# Patient Record
Sex: FEMALE | Race: WHITE | NOT HISPANIC OR LATINO | Employment: OTHER | URBAN - METROPOLITAN AREA
[De-identification: names, ages, dates, MRNs, and addresses within clinical notes are randomized per-mention and may not be internally consistent; named-entity substitution may affect disease eponyms.]

---

## 2019-04-25 ENCOUNTER — HOSPITAL ENCOUNTER (OUTPATIENT)
Dept: SURGERY | Facility: HOSPITAL | Age: 69
Setting detail: HOSPITAL OUTPATIENT SURGERY
Discharge: HOME | End: 2019-04-25
Attending: OPHTHALMOLOGY

## 2019-04-25 LAB — GLUCOSE BLD-MCNC: 105 MG/DL (ref 65–99)

## 2019-05-02 ENCOUNTER — HOSPITAL ENCOUNTER (OUTPATIENT)
Dept: SURGERY | Facility: HOSPITAL | Age: 69
Setting detail: HOSPITAL OUTPATIENT SURGERY
Discharge: HOME | End: 2019-05-02
Attending: OPHTHALMOLOGY

## 2019-05-02 LAB — GLUCOSE BLD-MCNC: 98 MG/DL (ref 65–99)

## 2021-08-31 ENCOUNTER — OFFICE VISIT (OUTPATIENT)
Dept: VASCULAR SURGERY | Facility: HOSPITAL | Age: 71
End: 2021-08-31

## 2021-08-31 VITALS
OXYGEN SATURATION: 96 % | SYSTOLIC BLOOD PRESSURE: 102 MMHG | TEMPERATURE: 96.2 F | HEART RATE: 74 BPM | RESPIRATION RATE: 16 BRPM | DIASTOLIC BLOOD PRESSURE: 66 MMHG

## 2021-08-31 DIAGNOSIS — I70.234 ATHEROSCLEROSIS OF NATIVE ARTERY OF BOTH LOWER EXTREMITIES WITH BILATERAL ULCERATION OF HEELS (HCC): Primary | ICD-10-CM

## 2021-08-31 DIAGNOSIS — I70.244 ATHEROSCLEROSIS OF NATIVE ARTERY OF BOTH LOWER EXTREMITIES WITH BILATERAL ULCERATION OF HEELS (HCC): Primary | ICD-10-CM

## 2021-08-31 PROCEDURE — G0463 HOSPITAL OUTPT CLINIC VISIT: HCPCS | Performed by: SURGERY

## 2021-08-31 PROCEDURE — 99203 OFFICE O/P NEW LOW 30 MIN: CPT | Performed by: SURGERY

## 2021-08-31 NOTE — PROGRESS NOTES
[facility]   HISTORY AND PHYSICAL    Patient Name: Chantal Kumar  : 1950  MRN: [id number]  Primary Care Physician:  Sasha Bui MD      Subjective   Subjective     Chief Complaint: Bilateral leg ulcers    HPI:    Chantal Kumar is a 71 y.o. female who presents with bilateral leg ulcers.  Patient lives in a nursing home.  She had a stroke several years ago and has been bedridden ever since.  She is confined to most of the bed as it is painful for her to sit up.  She has bilateral heel ulcers.  The ulcers are painful.        Personal History     No past medical history on file.    No past surgical history on file.    Family History: family history is not on file. Otherwise pertinent FHx was reviewed and not pertinent to current issue.    Social History:      Home Medications:  No current outpatient medications on file prior to visit.     No current facility-administered medications on file prior to visit.          Allergies:  No Known Allergies    Objective   Objective     Vitals:   Temp:  [96.2 °F (35.7 °C)] 96.2 °F (35.7 °C)  Heart Rate:  [74] 74  Resp:  [16] 16  BP: (102)/(66) 102/66    Physical Exam  Constitutional:       Appearance: She is obese.   HENT:      Head: Normocephalic and atraumatic.   Eyes:      Extraocular Movements: Extraocular movements intact.      Pupils: Pupils are equal, round, and reactive to light.   Cardiovascular:      Rate and Rhythm: Normal rate and regular rhythm.      Pulses:           Femoral pulses are 3+ on the right side and 3+ on the left side.       Popliteal pulses are 0 on the right side and 0 on the left side.        Dorsalis pedis pulses are 0 on the right side and 0 on the left side.        Posterior tibial pulses are 0 on the right side and 0 on the left side.      Comments: Large bilateral heel ulcers with no signs of infection.  Abdominal:      General: Bowel sounds are normal.      Palpations: Abdomen is soft.   Musculoskeletal:      Cervical  back: Normal range of motion and neck supple.   Neurological:      Mental Status: She is alert.            Result Review    Most notable findings include: Arterial Dopplers at outside hospital show moderate arterial sufficiency bilaterally    Assessment/Plan   Assessment / Plan     Brief Patient Summary:  Chantal Kumar is a 71 y.o. female who has bilateral heel ulcers and is nonambulatory    There are no diagnoses linked to this encounter.     Plan:   I explained to the patient that she is not a candidate for limb salvage due to the fact that she is nonambulatory.  At this time the ulcers are not infected.  I recommend local wound care and offloading of the heel so that the ulcers do not get any worse.  If the ulcers become infected or she has intractable pain, she would be a candidate for bilateral above-knee amputations.  The patient fully understood her diagnosis and treatment plan.    Thank you for allowing me to see your patient.        Electronically signed by Henrique Lemus MD, MD, 08/31/21, 3:27 PM EDT.

## 2022-01-17 ENCOUNTER — HOSPITAL ENCOUNTER (INPATIENT)
Facility: HOSPITAL | Age: 72
LOS: 11 days | Discharge: NURSING, REHAB OR LONG-TERM CARE | End: 2022-01-29
Attending: EMERGENCY MEDICINE | Admitting: FAMILY MEDICINE

## 2022-01-17 ENCOUNTER — APPOINTMENT (OUTPATIENT)
Dept: GENERAL RADIOLOGY | Facility: HOSPITAL | Age: 72
End: 2022-01-17

## 2022-01-17 ENCOUNTER — APPOINTMENT (OUTPATIENT)
Dept: CT IMAGING | Facility: HOSPITAL | Age: 72
End: 2022-01-17

## 2022-01-17 DIAGNOSIS — R26.2 DIFFICULTY WALKING: ICD-10-CM

## 2022-01-17 DIAGNOSIS — S31.000A WOUND OF SACRAL REGION, INITIAL ENCOUNTER: ICD-10-CM

## 2022-01-17 DIAGNOSIS — Z78.9 DECREASED ACTIVITIES OF DAILY LIVING (ADL): ICD-10-CM

## 2022-01-17 DIAGNOSIS — S91.309A MULTIPLE OPEN WOUNDS OF FOOT: Primary | ICD-10-CM

## 2022-01-17 DIAGNOSIS — E87.20 LACTIC ACIDOSIS: ICD-10-CM

## 2022-01-17 DIAGNOSIS — N39.0 ACUTE UTI: ICD-10-CM

## 2022-01-17 LAB
ALBUMIN SERPL-MCNC: 2.9 G/DL (ref 3.5–5.2)
ALBUMIN/GLOB SERPL: 0.7 G/DL
ALP SERPL-CCNC: 92 U/L (ref 39–117)
ALT SERPL W P-5'-P-CCNC: 7 U/L (ref 1–33)
ANION GAP SERPL CALCULATED.3IONS-SCNC: 12.6 MMOL/L (ref 5–15)
AST SERPL-CCNC: 11 U/L (ref 1–32)
BACTERIA UR QL AUTO: ABNORMAL /HPF
BASOPHILS # BLD AUTO: 0.03 10*3/MM3 (ref 0–0.2)
BASOPHILS NFR BLD AUTO: 0.2 % (ref 0–1.5)
BILIRUB SERPL-MCNC: <0.2 MG/DL (ref 0–1.2)
BILIRUB UR QL STRIP: NEGATIVE
BUN SERPL-MCNC: 25 MG/DL (ref 8–23)
BUN/CREAT SERPL: 18.7 (ref 7–25)
CALCIUM SPEC-SCNC: 9.6 MG/DL (ref 8.6–10.5)
CHLORIDE SERPL-SCNC: 110 MMOL/L (ref 98–107)
CLARITY UR: CLEAR
CO2 SERPL-SCNC: 21.4 MMOL/L (ref 22–29)
COLOR UR: YELLOW
CREAT SERPL-MCNC: 1.34 MG/DL (ref 0.57–1)
D-LACTATE SERPL-SCNC: 1 MMOL/L (ref 0.5–2)
D-LACTATE SERPL-SCNC: 2.6 MMOL/L (ref 0.5–2)
DEPRECATED RDW RBC AUTO: 42 FL (ref 37–54)
EOSINOPHIL # BLD AUTO: 0.05 10*3/MM3 (ref 0–0.4)
EOSINOPHIL NFR BLD AUTO: 0.3 % (ref 0.3–6.2)
ERYTHROCYTE [DISTWIDTH] IN BLOOD BY AUTOMATED COUNT: 14.1 % (ref 12.3–15.4)
GFR SERPL CREATININE-BSD FRML MDRD: 39 ML/MIN/1.73
GLOBULIN UR ELPH-MCNC: 4.2 GM/DL
GLUCOSE BLDC GLUCOMTR-MCNC: 72 MG/DL (ref 70–99)
GLUCOSE SERPL-MCNC: 71 MG/DL (ref 65–99)
GLUCOSE UR STRIP-MCNC: NEGATIVE MG/DL
HCT VFR BLD AUTO: 35 % (ref 34–46.6)
HGB BLD-MCNC: 10.9 G/DL (ref 12–15.9)
HGB UR QL STRIP.AUTO: NEGATIVE
HOLD SPECIMEN: NORMAL
HOLD SPECIMEN: NORMAL
HYALINE CASTS UR QL AUTO: ABNORMAL /LPF
IMM GRANULOCYTES # BLD AUTO: 0.07 10*3/MM3 (ref 0–0.05)
IMM GRANULOCYTES NFR BLD AUTO: 0.5 % (ref 0–0.5)
KETONES UR QL STRIP: NEGATIVE
LEUKOCYTE ESTERASE UR QL STRIP.AUTO: NEGATIVE
LYMPHOCYTES # BLD AUTO: 1.24 10*3/MM3 (ref 0.7–3.1)
LYMPHOCYTES NFR BLD AUTO: 8.1 % (ref 19.6–45.3)
MCH RBC QN AUTO: 26 PG (ref 26.6–33)
MCHC RBC AUTO-ENTMCNC: 31.1 G/DL (ref 31.5–35.7)
MCV RBC AUTO: 83.3 FL (ref 79–97)
MONOCYTES # BLD AUTO: 0.88 10*3/MM3 (ref 0.1–0.9)
MONOCYTES NFR BLD AUTO: 5.8 % (ref 5–12)
NEUTROPHILS NFR BLD AUTO: 13.03 10*3/MM3 (ref 1.7–7)
NEUTROPHILS NFR BLD AUTO: 85.1 % (ref 42.7–76)
NITRITE UR QL STRIP: NEGATIVE
NRBC BLD AUTO-RTO: 0 /100 WBC (ref 0–0.2)
PH UR STRIP.AUTO: 5.5 [PH] (ref 5–8)
PLATELET # BLD AUTO: 360 10*3/MM3 (ref 140–450)
PMV BLD AUTO: 9.4 FL (ref 6–12)
POTASSIUM SERPL-SCNC: 3.8 MMOL/L (ref 3.5–5.2)
PROT SERPL-MCNC: 7.1 G/DL (ref 6–8.5)
PROT UR QL STRIP: ABNORMAL
RBC # BLD AUTO: 4.2 10*6/MM3 (ref 3.77–5.28)
RBC # UR STRIP: ABNORMAL /HPF
REF LAB TEST METHOD: ABNORMAL
SODIUM SERPL-SCNC: 144 MMOL/L (ref 136–145)
SP GR UR STRIP: 1.02 (ref 1–1.03)
SQUAMOUS #/AREA URNS HPF: ABNORMAL /HPF
UROBILINOGEN UR QL STRIP: ABNORMAL
WBC # UR STRIP: ABNORMAL /HPF
WBC NRBC COR # BLD: 15.3 10*3/MM3 (ref 3.4–10.8)
WHOLE BLOOD HOLD SPECIMEN: NORMAL
WHOLE BLOOD HOLD SPECIMEN: NORMAL

## 2022-01-17 PROCEDURE — 87040 BLOOD CULTURE FOR BACTERIA: CPT | Performed by: EMERGENCY MEDICINE

## 2022-01-17 PROCEDURE — 36415 COLL VENOUS BLD VENIPUNCTURE: CPT | Performed by: EMERGENCY MEDICINE

## 2022-01-17 PROCEDURE — 81001 URINALYSIS AUTO W/SCOPE: CPT | Performed by: EMERGENCY MEDICINE

## 2022-01-17 PROCEDURE — 70450 CT HEAD/BRAIN W/O DYE: CPT

## 2022-01-17 PROCEDURE — 99285 EMERGENCY DEPT VISIT HI MDM: CPT

## 2022-01-17 PROCEDURE — 87040 BLOOD CULTURE FOR BACTERIA: CPT

## 2022-01-17 PROCEDURE — P9612 CATHETERIZE FOR URINE SPEC: HCPCS

## 2022-01-17 PROCEDURE — 85025 COMPLETE CBC W/AUTO DIFF WBC: CPT | Performed by: EMERGENCY MEDICINE

## 2022-01-17 PROCEDURE — 83605 ASSAY OF LACTIC ACID: CPT | Performed by: EMERGENCY MEDICINE

## 2022-01-17 PROCEDURE — 71045 X-RAY EXAM CHEST 1 VIEW: CPT

## 2022-01-17 PROCEDURE — 80053 COMPREHEN METABOLIC PANEL: CPT | Performed by: EMERGENCY MEDICINE

## 2022-01-17 PROCEDURE — 25010000002 CEFTRIAXONE PER 250 MG: Performed by: EMERGENCY MEDICINE

## 2022-01-17 PROCEDURE — 82962 GLUCOSE BLOOD TEST: CPT

## 2022-01-17 PROCEDURE — 99223 1ST HOSP IP/OBS HIGH 75: CPT | Performed by: FAMILY MEDICINE

## 2022-01-17 PROCEDURE — 25010000002 MORPHINE PER 10 MG: Performed by: EMERGENCY MEDICINE

## 2022-01-17 RX ORDER — LEVETIRACETAM 500 MG/1
500 TABLET ORAL 2 TIMES DAILY
COMMUNITY

## 2022-01-17 RX ORDER — SODIUM CHLORIDE 0.9 % (FLUSH) 0.9 %
10 SYRINGE (ML) INJECTION AS NEEDED
Status: DISCONTINUED | OUTPATIENT
Start: 2022-01-17 | End: 2022-01-29 | Stop reason: HOSPADM

## 2022-01-17 RX ORDER — GLIMEPIRIDE 2 MG/1
2 TABLET ORAL
COMMUNITY

## 2022-01-17 RX ORDER — BUSPIRONE HYDROCHLORIDE 15 MG/1
15 TABLET ORAL 3 TIMES DAILY
COMMUNITY

## 2022-01-17 RX ORDER — BUPROPION HYDROCHLORIDE 150 MG/1
150 TABLET ORAL DAILY
COMMUNITY

## 2022-01-17 RX ORDER — FUROSEMIDE 20 MG/1
20 TABLET ORAL
COMMUNITY

## 2022-01-17 RX ORDER — PROGESTERONE 100 MG/1
100 CAPSULE ORAL DAILY
COMMUNITY
End: 2022-01-17

## 2022-01-17 RX ORDER — TIZANIDINE 2 MG/1
2 TABLET ORAL 2 TIMES DAILY
COMMUNITY

## 2022-01-17 RX ORDER — TRAZODONE HYDROCHLORIDE 50 MG/1
50 TABLET ORAL
COMMUNITY

## 2022-01-17 RX ORDER — ATORVASTATIN CALCIUM 10 MG/1
TABLET, FILM COATED ORAL
COMMUNITY

## 2022-01-17 RX ORDER — ATENOLOL 25 MG/1
25 TABLET ORAL DAILY
COMMUNITY
End: 2022-01-29 | Stop reason: HOSPADM

## 2022-01-17 RX ORDER — LEVOMEFOLATE CALCIUM 7.5 MG
7.5 TABLET ORAL DAILY
COMMUNITY

## 2022-01-17 RX ORDER — MONTELUKAST SODIUM 4 MG/1
1 TABLET, CHEWABLE ORAL 2 TIMES DAILY
COMMUNITY

## 2022-01-17 RX ORDER — MORPHINE SULFATE 2 MG/ML
2 INJECTION, SOLUTION INTRAMUSCULAR; INTRAVENOUS ONCE
Status: COMPLETED | OUTPATIENT
Start: 2022-01-17 | End: 2022-01-17

## 2022-01-17 RX ORDER — MULTIPLE VITAMINS W/ MINERALS TAB 9MG-400MCG
1 TAB ORAL DAILY
COMMUNITY

## 2022-01-17 RX ORDER — CEFTRIAXONE SODIUM 1 G/50ML
1 INJECTION, SOLUTION INTRAVENOUS ONCE
Status: COMPLETED | OUTPATIENT
Start: 2022-01-17 | End: 2022-01-17

## 2022-01-17 RX ORDER — BACLOFEN 10 MG/1
10 TABLET ORAL 2 TIMES DAILY
COMMUNITY

## 2022-01-17 RX ORDER — FERROUS FUMARATE 324(106)MG
324 TABLET ORAL DAILY
COMMUNITY

## 2022-01-17 RX ORDER — QUETIAPINE FUMARATE 200 MG/1
200 TABLET, FILM COATED ORAL NIGHTLY
COMMUNITY

## 2022-01-17 RX ORDER — TRAMADOL HYDROCHLORIDE 50 MG/1
50 TABLET ORAL EVERY 8 HOURS PRN
COMMUNITY

## 2022-01-17 RX ORDER — CILOSTAZOL 100 MG/1
100 TABLET ORAL 2 TIMES DAILY
COMMUNITY

## 2022-01-17 RX ORDER — OMEPRAZOLE 20 MG/1
20 CAPSULE, DELAYED RELEASE ORAL DAILY
COMMUNITY

## 2022-01-17 RX ORDER — FENTANYL 50 UG/H
1 PATCH TRANSDERMAL
COMMUNITY

## 2022-01-17 RX ORDER — ASPIRIN 81 MG/1
81 TABLET ORAL DAILY
COMMUNITY

## 2022-01-17 RX ADMIN — SODIUM CHLORIDE 1000 ML: 9 INJECTION, SOLUTION INTRAVENOUS at 19:29

## 2022-01-17 RX ADMIN — CEFTRIAXONE SODIUM 1 G: 1 INJECTION, SOLUTION INTRAVENOUS at 19:29

## 2022-01-17 RX ADMIN — MORPHINE SULFATE 2 MG: 2 INJECTION, SOLUTION INTRAMUSCULAR; INTRAVENOUS at 21:56

## 2022-01-18 PROBLEM — S91.309A MULTIPLE OPEN WOUNDS OF FOOT: Status: ACTIVE | Noted: 2022-01-18

## 2022-01-18 LAB
ABO GROUP BLD: NORMAL
ABO GROUP BLD: NORMAL
ALBUMIN SERPL-MCNC: 2.6 G/DL (ref 3.5–5.2)
ANION GAP SERPL CALCULATED.3IONS-SCNC: 9.5 MMOL/L (ref 5–15)
BASOPHILS # BLD AUTO: 0.03 10*3/MM3 (ref 0–0.2)
BASOPHILS NFR BLD AUTO: 0.2 % (ref 0–1.5)
BLD GP AB SCN SERPL QL: NEGATIVE
BUN SERPL-MCNC: 19 MG/DL (ref 8–23)
BUN/CREAT SERPL: 17.4 (ref 7–25)
CALCIUM SPEC-SCNC: 8.8 MG/DL (ref 8.6–10.5)
CHLORIDE SERPL-SCNC: 107 MMOL/L (ref 98–107)
CO2 SERPL-SCNC: 19.5 MMOL/L (ref 22–29)
CREAT SERPL-MCNC: 1.09 MG/DL (ref 0.57–1)
DEPRECATED RDW RBC AUTO: 41.5 FL (ref 37–54)
EOSINOPHIL # BLD AUTO: 0.04 10*3/MM3 (ref 0–0.4)
EOSINOPHIL NFR BLD AUTO: 0.3 % (ref 0.3–6.2)
ERYTHROCYTE [DISTWIDTH] IN BLOOD BY AUTOMATED COUNT: 13.9 % (ref 12.3–15.4)
GFR SERPL CREATININE-BSD FRML MDRD: 49 ML/MIN/1.73
GLUCOSE SERPL-MCNC: 127 MG/DL (ref 65–99)
HCT VFR BLD AUTO: 31 % (ref 34–46.6)
HGB BLD-MCNC: 10 G/DL (ref 12–15.9)
IMM GRANULOCYTES # BLD AUTO: 0.07 10*3/MM3 (ref 0–0.05)
IMM GRANULOCYTES NFR BLD AUTO: 0.5 % (ref 0–0.5)
LYMPHOCYTES # BLD AUTO: 1.15 10*3/MM3 (ref 0.7–3.1)
LYMPHOCYTES NFR BLD AUTO: 8.9 % (ref 19.6–45.3)
MAGNESIUM SERPL-MCNC: 1.5 MG/DL (ref 1.6–2.4)
MCH RBC QN AUTO: 26.5 PG (ref 26.6–33)
MCHC RBC AUTO-ENTMCNC: 32.3 G/DL (ref 31.5–35.7)
MCV RBC AUTO: 82.2 FL (ref 79–97)
MONOCYTES # BLD AUTO: 0.64 10*3/MM3 (ref 0.1–0.9)
MONOCYTES NFR BLD AUTO: 5 % (ref 5–12)
NEUTROPHILS NFR BLD AUTO: 10.98 10*3/MM3 (ref 1.7–7)
NEUTROPHILS NFR BLD AUTO: 85.1 % (ref 42.7–76)
NRBC BLD AUTO-RTO: 0 /100 WBC (ref 0–0.2)
PHOSPHATE SERPL-MCNC: 3.3 MG/DL (ref 2.5–4.5)
PLATELET # BLD AUTO: 314 10*3/MM3 (ref 140–450)
PMV BLD AUTO: 9.3 FL (ref 6–12)
POTASSIUM SERPL-SCNC: 2.9 MMOL/L (ref 3.5–5.2)
PROCALCITONIN SERPL-MCNC: 0.14 NG/ML (ref 0–0.25)
RBC # BLD AUTO: 3.77 10*6/MM3 (ref 3.77–5.28)
RH BLD: POSITIVE
RH BLD: POSITIVE
SODIUM SERPL-SCNC: 136 MMOL/L (ref 136–145)
T&S EXPIRATION DATE: NORMAL
WBC NRBC COR # BLD: 12.91 10*3/MM3 (ref 3.4–10.8)

## 2022-01-18 PROCEDURE — 86850 RBC ANTIBODY SCREEN: CPT | Performed by: SURGERY

## 2022-01-18 PROCEDURE — 86900 BLOOD TYPING SEROLOGIC ABO: CPT

## 2022-01-18 PROCEDURE — 86901 BLOOD TYPING SEROLOGIC RH(D): CPT

## 2022-01-18 PROCEDURE — 84145 PROCALCITONIN (PCT): CPT | Performed by: INTERNAL MEDICINE

## 2022-01-18 PROCEDURE — 25010000002 HYDROMORPHONE 1 MG/ML SOLUTION: Performed by: FAMILY MEDICINE

## 2022-01-18 PROCEDURE — 25010000002 VANCOMYCIN 5 G RECONSTITUTED SOLUTION: Performed by: INTERNAL MEDICINE

## 2022-01-18 PROCEDURE — 86900 BLOOD TYPING SEROLOGIC ABO: CPT | Performed by: SURGERY

## 2022-01-18 PROCEDURE — 25010000002 HEPARIN (PORCINE) PER 1000 UNITS: Performed by: FAMILY MEDICINE

## 2022-01-18 PROCEDURE — 25010000002 PIPERACILLIN SOD-TAZOBACTAM PER 1 G: Performed by: FAMILY MEDICINE

## 2022-01-18 PROCEDURE — 83735 ASSAY OF MAGNESIUM: CPT | Performed by: INTERNAL MEDICINE

## 2022-01-18 PROCEDURE — 99233 SBSQ HOSP IP/OBS HIGH 50: CPT | Performed by: INTERNAL MEDICINE

## 2022-01-18 PROCEDURE — 85025 COMPLETE CBC W/AUTO DIFF WBC: CPT | Performed by: FAMILY MEDICINE

## 2022-01-18 PROCEDURE — 80069 RENAL FUNCTION PANEL: CPT | Performed by: INTERNAL MEDICINE

## 2022-01-18 PROCEDURE — 25010000002 MORPHINE PER 10 MG: Performed by: FAMILY MEDICINE

## 2022-01-18 PROCEDURE — 25010000002 MORPHINE PER 10 MG: Performed by: EMERGENCY MEDICINE

## 2022-01-18 PROCEDURE — 86901 BLOOD TYPING SEROLOGIC RH(D): CPT | Performed by: SURGERY

## 2022-01-18 PROCEDURE — 99222 1ST HOSP IP/OBS MODERATE 55: CPT | Performed by: SURGERY

## 2022-01-18 RX ORDER — TIZANIDINE 4 MG/1
2 TABLET ORAL 2 TIMES DAILY
Status: DISCONTINUED | OUTPATIENT
Start: 2022-01-18 | End: 2022-01-19

## 2022-01-18 RX ORDER — CHOLECALCIFEROL (VITAMIN D3) 125 MCG
5 CAPSULE ORAL NIGHTLY PRN
Status: DISCONTINUED | OUTPATIENT
Start: 2022-01-18 | End: 2022-01-29 | Stop reason: HOSPADM

## 2022-01-18 RX ORDER — BISACODYL 10 MG
10 SUPPOSITORY, RECTAL RECTAL DAILY PRN
Status: DISCONTINUED | OUTPATIENT
Start: 2022-01-18 | End: 2022-01-29 | Stop reason: HOSPADM

## 2022-01-18 RX ORDER — FENTANYL 50 UG/H
1 PATCH TRANSDERMAL
Status: DISCONTINUED | OUTPATIENT
Start: 2022-01-18 | End: 2022-01-29 | Stop reason: HOSPADM

## 2022-01-18 RX ORDER — BUSPIRONE HYDROCHLORIDE 15 MG/1
15 TABLET ORAL 3 TIMES DAILY
Status: DISCONTINUED | OUTPATIENT
Start: 2022-01-18 | End: 2022-01-29 | Stop reason: HOSPADM

## 2022-01-18 RX ORDER — MORPHINE SULFATE 2 MG/ML
2 INJECTION, SOLUTION INTRAMUSCULAR; INTRAVENOUS ONCE
Status: COMPLETED | OUTPATIENT
Start: 2022-01-18 | End: 2022-01-18

## 2022-01-18 RX ORDER — SODIUM CHLORIDE 0.9 % (FLUSH) 0.9 %
10 SYRINGE (ML) INJECTION EVERY 12 HOURS SCHEDULED
Status: DISCONTINUED | OUTPATIENT
Start: 2022-01-18 | End: 2022-01-29 | Stop reason: HOSPADM

## 2022-01-18 RX ORDER — ACETAMINOPHEN 325 MG/1
650 TABLET ORAL EVERY 4 HOURS PRN
Status: DISCONTINUED | OUTPATIENT
Start: 2022-01-18 | End: 2022-01-29 | Stop reason: HOSPADM

## 2022-01-18 RX ORDER — BISACODYL 5 MG/1
5 TABLET, DELAYED RELEASE ORAL DAILY PRN
Status: DISCONTINUED | OUTPATIENT
Start: 2022-01-18 | End: 2022-01-29 | Stop reason: HOSPADM

## 2022-01-18 RX ORDER — QUETIAPINE FUMARATE 200 MG/1
200 TABLET, FILM COATED ORAL NIGHTLY
Status: DISCONTINUED | OUTPATIENT
Start: 2022-01-18 | End: 2022-01-19

## 2022-01-18 RX ORDER — ASPIRIN 81 MG/1
81 TABLET ORAL DAILY
Status: DISCONTINUED | OUTPATIENT
Start: 2022-01-19 | End: 2022-01-28

## 2022-01-18 RX ORDER — HYDROCODONE BITARTRATE AND ACETAMINOPHEN 5; 325 MG/1; MG/1
1 TABLET ORAL ONCE
Status: COMPLETED | OUTPATIENT
Start: 2022-01-18 | End: 2022-01-18

## 2022-01-18 RX ORDER — LEVETIRACETAM 500 MG/1
500 TABLET ORAL 2 TIMES DAILY
Status: DISCONTINUED | OUTPATIENT
Start: 2022-01-18 | End: 2022-01-29 | Stop reason: HOSPADM

## 2022-01-18 RX ORDER — AMOXICILLIN 250 MG
2 CAPSULE ORAL 2 TIMES DAILY
Status: DISCONTINUED | OUTPATIENT
Start: 2022-01-18 | End: 2022-01-29 | Stop reason: HOSPADM

## 2022-01-18 RX ORDER — BUPROPION HYDROCHLORIDE 150 MG/1
150 TABLET ORAL DAILY
Status: DISCONTINUED | OUTPATIENT
Start: 2022-01-18 | End: 2022-01-29 | Stop reason: HOSPADM

## 2022-01-18 RX ORDER — ATENOLOL 25 MG/1
25 TABLET ORAL DAILY
Status: DISCONTINUED | OUTPATIENT
Start: 2022-01-18 | End: 2022-01-21

## 2022-01-18 RX ORDER — TRAZODONE HYDROCHLORIDE 50 MG/1
25 TABLET ORAL NIGHTLY
Status: DISCONTINUED | OUTPATIENT
Start: 2022-01-18 | End: 2022-01-18

## 2022-01-18 RX ORDER — POLYETHYLENE GLYCOL 3350 17 G/17G
17 POWDER, FOR SOLUTION ORAL DAILY PRN
Status: DISCONTINUED | OUTPATIENT
Start: 2022-01-18 | End: 2022-01-29 | Stop reason: HOSPADM

## 2022-01-18 RX ORDER — ACETAMINOPHEN 160 MG/5ML
650 SOLUTION ORAL EVERY 4 HOURS PRN
Status: DISCONTINUED | OUTPATIENT
Start: 2022-01-18 | End: 2022-01-29 | Stop reason: HOSPADM

## 2022-01-18 RX ORDER — NALOXONE HCL 0.4 MG/ML
0.4 VIAL (ML) INJECTION
Status: DISCONTINUED | OUTPATIENT
Start: 2022-01-18 | End: 2022-01-29 | Stop reason: HOSPADM

## 2022-01-18 RX ORDER — ACETAMINOPHEN 650 MG/1
650 SUPPOSITORY RECTAL EVERY 4 HOURS PRN
Status: DISCONTINUED | OUTPATIENT
Start: 2022-01-18 | End: 2022-01-29 | Stop reason: HOSPADM

## 2022-01-18 RX ORDER — HEPARIN SODIUM 5000 [USP'U]/ML
5000 INJECTION, SOLUTION INTRAVENOUS; SUBCUTANEOUS EVERY 8 HOURS SCHEDULED
Status: DISCONTINUED | OUTPATIENT
Start: 2022-01-18 | End: 2022-01-28

## 2022-01-18 RX ORDER — SODIUM CHLORIDE 0.9 % (FLUSH) 0.9 %
10 SYRINGE (ML) INJECTION AS NEEDED
Status: DISCONTINUED | OUTPATIENT
Start: 2022-01-18 | End: 2022-01-29 | Stop reason: HOSPADM

## 2022-01-18 RX ORDER — ONDANSETRON 2 MG/ML
4 INJECTION INTRAMUSCULAR; INTRAVENOUS EVERY 6 HOURS PRN
Status: DISCONTINUED | OUTPATIENT
Start: 2022-01-18 | End: 2022-01-29 | Stop reason: HOSPADM

## 2022-01-18 RX ORDER — POTASSIUM CHLORIDE 750 MG/1
30 CAPSULE, EXTENDED RELEASE ORAL ONCE
Status: COMPLETED | OUTPATIENT
Start: 2022-01-18 | End: 2022-01-18

## 2022-01-18 RX ORDER — MORPHINE SULFATE 2 MG/ML
1 INJECTION, SOLUTION INTRAMUSCULAR; INTRAVENOUS EVERY 4 HOURS PRN
Status: DISCONTINUED | OUTPATIENT
Start: 2022-01-18 | End: 2022-01-25

## 2022-01-18 RX ADMIN — FENTANYL TRANSDERMAL 1 PATCH: 50 PATCH, EXTENDED RELEASE TRANSDERMAL at 22:15

## 2022-01-18 RX ADMIN — BUSPIRONE HYDROCHLORIDE 15 MG: 15 TABLET ORAL at 22:11

## 2022-01-18 RX ADMIN — VANCOMYCIN HYDROCHLORIDE 1500 MG: 5 INJECTION, POWDER, LYOPHILIZED, FOR SOLUTION INTRAVENOUS at 22:32

## 2022-01-18 RX ADMIN — ATENOLOL 25 MG: 25 TABLET ORAL at 22:11

## 2022-01-18 RX ADMIN — HYDROCODONE BITARTRATE AND ACETAMINOPHEN 1 TABLET: 5; 325 TABLET ORAL at 04:44

## 2022-01-18 RX ADMIN — TAZOBACTAM SODIUM AND PIPERACILLIN SODIUM 3.38 G: 375; 3 INJECTION, SOLUTION INTRAVENOUS at 07:49

## 2022-01-18 RX ADMIN — MORPHINE SULFATE 1 MG: 2 INJECTION, SOLUTION INTRAMUSCULAR; INTRAVENOUS at 11:34

## 2022-01-18 RX ADMIN — SODIUM CHLORIDE, PRESERVATIVE FREE 10 ML: 5 INJECTION INTRAVENOUS at 22:14

## 2022-01-18 RX ADMIN — HYDROMORPHONE HYDROCHLORIDE 0.5 MG: 1 INJECTION, SOLUTION INTRAMUSCULAR; INTRAVENOUS; SUBCUTANEOUS at 04:06

## 2022-01-18 RX ADMIN — HEPARIN SODIUM 5000 UNITS: 5000 INJECTION, SOLUTION INTRAVENOUS; SUBCUTANEOUS at 16:56

## 2022-01-18 RX ADMIN — QUETIAPINE FUMARATE 200 MG: 200 TABLET ORAL at 22:12

## 2022-01-18 RX ADMIN — MORPHINE SULFATE 1 MG: 2 INJECTION, SOLUTION INTRAMUSCULAR; INTRAVENOUS at 18:58

## 2022-01-18 RX ADMIN — TAZOBACTAM SODIUM AND PIPERACILLIN SODIUM 3.38 G: 375; 3 INJECTION, SOLUTION INTRAVENOUS at 16:55

## 2022-01-18 RX ADMIN — MORPHINE SULFATE 1 MG: 2 INJECTION, SOLUTION INTRAMUSCULAR; INTRAVENOUS at 23:30

## 2022-01-18 RX ADMIN — TIZANIDINE 2 MG: 4 TABLET ORAL at 22:11

## 2022-01-18 RX ADMIN — HEPARIN SODIUM 5000 UNITS: 5000 INJECTION, SOLUTION INTRAVENOUS; SUBCUTANEOUS at 22:12

## 2022-01-18 RX ADMIN — LEVETIRACETAM 500 MG: 500 TABLET, FILM COATED ORAL at 22:10

## 2022-01-18 RX ADMIN — MORPHINE SULFATE 2 MG: 2 INJECTION, SOLUTION INTRAMUSCULAR; INTRAVENOUS at 01:59

## 2022-01-18 RX ADMIN — HEPARIN SODIUM 5000 UNITS: 5000 INJECTION, SOLUTION INTRAVENOUS; SUBCUTANEOUS at 05:25

## 2022-01-18 RX ADMIN — BUPROPION HYDROCHLORIDE 150 MG: 150 TABLET, EXTENDED RELEASE ORAL at 22:11

## 2022-01-18 RX ADMIN — POTASSIUM CHLORIDE 30 MEQ: 750 CAPSULE, EXTENDED RELEASE ORAL at 16:56

## 2022-01-18 RX ADMIN — SENNOSIDES AND DOCUSATE SODIUM 2 TABLET: 50; 8.6 TABLET ORAL at 08:07

## 2022-01-19 LAB
ALBUMIN SERPL-MCNC: 2.4 G/DL (ref 3.5–5.2)
ALBUMIN/GLOB SERPL: 0.6 G/DL
ALP SERPL-CCNC: 90 U/L (ref 39–117)
ALT SERPL W P-5'-P-CCNC: 7 U/L (ref 1–33)
ANION GAP SERPL CALCULATED.3IONS-SCNC: 10.5 MMOL/L (ref 5–15)
AST SERPL-CCNC: 9 U/L (ref 1–32)
BASOPHILS # BLD AUTO: 0.04 10*3/MM3 (ref 0–0.2)
BASOPHILS NFR BLD AUTO: 0.4 % (ref 0–1.5)
BILIRUB SERPL-MCNC: 0.2 MG/DL (ref 0–1.2)
BUN SERPL-MCNC: 14 MG/DL (ref 8–23)
BUN/CREAT SERPL: 12.7 (ref 7–25)
CALCIUM SPEC-SCNC: 8.9 MG/DL (ref 8.6–10.5)
CHLORIDE SERPL-SCNC: 108 MMOL/L (ref 98–107)
CO2 SERPL-SCNC: 18.5 MMOL/L (ref 22–29)
CREAT SERPL-MCNC: 1.1 MG/DL (ref 0.57–1)
DEPRECATED RDW RBC AUTO: 40.9 FL (ref 37–54)
EOSINOPHIL # BLD AUTO: 0.09 10*3/MM3 (ref 0–0.4)
EOSINOPHIL NFR BLD AUTO: 0.9 % (ref 0.3–6.2)
ERYTHROCYTE [DISTWIDTH] IN BLOOD BY AUTOMATED COUNT: 13.9 % (ref 12.3–15.4)
GFR SERPL CREATININE-BSD FRML MDRD: 49 ML/MIN/1.73
GLOBULIN UR ELPH-MCNC: 3.9 GM/DL
GLUCOSE SERPL-MCNC: 127 MG/DL (ref 65–99)
HCT VFR BLD AUTO: 33.4 % (ref 34–46.6)
HGB BLD-MCNC: 10.7 G/DL (ref 12–15.9)
IMM GRANULOCYTES # BLD AUTO: 0.06 10*3/MM3 (ref 0–0.05)
IMM GRANULOCYTES NFR BLD AUTO: 0.6 % (ref 0–0.5)
INR PPP: 1.08 (ref 2–3)
LYMPHOCYTES # BLD AUTO: 1.6 10*3/MM3 (ref 0.7–3.1)
LYMPHOCYTES NFR BLD AUTO: 15.8 % (ref 19.6–45.3)
MCH RBC QN AUTO: 25.7 PG (ref 26.6–33)
MCHC RBC AUTO-ENTMCNC: 32 G/DL (ref 31.5–35.7)
MCV RBC AUTO: 80.3 FL (ref 79–97)
MONOCYTES # BLD AUTO: 0.63 10*3/MM3 (ref 0.1–0.9)
MONOCYTES NFR BLD AUTO: 6.2 % (ref 5–12)
NEUTROPHILS NFR BLD AUTO: 7.73 10*3/MM3 (ref 1.7–7)
NEUTROPHILS NFR BLD AUTO: 76.1 % (ref 42.7–76)
NRBC BLD AUTO-RTO: 0 /100 WBC (ref 0–0.2)
PHOSPHATE SERPL-MCNC: 3 MG/DL (ref 2.5–4.5)
PLATELET # BLD AUTO: 287 10*3/MM3 (ref 140–450)
PMV BLD AUTO: 9.5 FL (ref 6–12)
POTASSIUM SERPL-SCNC: 3.2 MMOL/L (ref 3.5–5.2)
PROT SERPL-MCNC: 6.3 G/DL (ref 6–8.5)
PROTHROMBIN TIME: 11.2 SECONDS (ref 9.4–12)
RBC # BLD AUTO: 4.16 10*6/MM3 (ref 3.77–5.28)
SARS-COV-2 RNA RESP QL NAA+PROBE: NOT DETECTED
SODIUM SERPL-SCNC: 137 MMOL/L (ref 136–145)
VANCOMYCIN SERPL-MCNC: 14.2 MCG/ML (ref 5–40)
WBC NRBC COR # BLD: 10.15 10*3/MM3 (ref 3.4–10.8)

## 2022-01-19 PROCEDURE — 25010000002 VANCOMYCIN 5 G RECONSTITUTED SOLUTION: Performed by: INTERNAL MEDICINE

## 2022-01-19 PROCEDURE — U0003 INFECTIOUS AGENT DETECTION BY NUCLEIC ACID (DNA OR RNA); SEVERE ACUTE RESPIRATORY SYNDROME CORONAVIRUS 2 (SARS-COV-2) (CORONAVIRUS DISEASE [COVID-19]), AMPLIFIED PROBE TECHNIQUE, MAKING USE OF HIGH THROUGHPUT TECHNOLOGIES AS DESCRIBED BY CMS-2020-01-R: HCPCS | Performed by: INTERNAL MEDICINE

## 2022-01-19 PROCEDURE — 85610 PROTHROMBIN TIME: CPT | Performed by: INTERNAL MEDICINE

## 2022-01-19 PROCEDURE — 80202 ASSAY OF VANCOMYCIN: CPT | Performed by: INTERNAL MEDICINE

## 2022-01-19 PROCEDURE — U0005 INFEC AGEN DETEC AMPLI PROBE: HCPCS | Performed by: INTERNAL MEDICINE

## 2022-01-19 PROCEDURE — 80053 COMPREHEN METABOLIC PANEL: CPT | Performed by: INTERNAL MEDICINE

## 2022-01-19 PROCEDURE — 85025 COMPLETE CBC W/AUTO DIFF WBC: CPT | Performed by: INTERNAL MEDICINE

## 2022-01-19 PROCEDURE — 25010000002 PIPERACILLIN SOD-TAZOBACTAM PER 1 G: Performed by: FAMILY MEDICINE

## 2022-01-19 PROCEDURE — 99233 SBSQ HOSP IP/OBS HIGH 50: CPT | Performed by: INTERNAL MEDICINE

## 2022-01-19 PROCEDURE — 84100 ASSAY OF PHOSPHORUS: CPT | Performed by: INTERNAL MEDICINE

## 2022-01-19 PROCEDURE — 25010000002 HEPARIN (PORCINE) PER 1000 UNITS: Performed by: FAMILY MEDICINE

## 2022-01-19 RX ORDER — SODIUM CHLORIDE, SODIUM LACTATE, POTASSIUM CHLORIDE, CALCIUM CHLORIDE 600; 310; 30; 20 MG/100ML; MG/100ML; MG/100ML; MG/100ML
125 INJECTION, SOLUTION INTRAVENOUS CONTINUOUS
Status: DISCONTINUED | OUTPATIENT
Start: 2022-01-19 | End: 2022-01-27

## 2022-01-19 RX ORDER — QUETIAPINE FUMARATE 100 MG/1
100 TABLET, FILM COATED ORAL NIGHTLY
Status: DISCONTINUED | OUTPATIENT
Start: 2022-01-19 | End: 2022-01-29 | Stop reason: HOSPADM

## 2022-01-19 RX ADMIN — ACETAMINOPHEN 650 MG: 325 TABLET ORAL at 20:22

## 2022-01-19 RX ADMIN — HEPARIN SODIUM 5000 UNITS: 5000 INJECTION, SOLUTION INTRAVENOUS; SUBCUTANEOUS at 13:48

## 2022-01-19 RX ADMIN — HEPARIN SODIUM 5000 UNITS: 5000 INJECTION, SOLUTION INTRAVENOUS; SUBCUTANEOUS at 06:24

## 2022-01-19 RX ADMIN — SODIUM CHLORIDE 500 ML: 9 INJECTION, SOLUTION INTRAVENOUS at 11:01

## 2022-01-19 RX ADMIN — SODIUM CHLORIDE 1000 ML: 9 INJECTION, SOLUTION INTRAVENOUS at 15:23

## 2022-01-19 RX ADMIN — BUSPIRONE HYDROCHLORIDE 15 MG: 15 TABLET ORAL at 20:22

## 2022-01-19 RX ADMIN — SODIUM CHLORIDE, PRESERVATIVE FREE 10 ML: 5 INJECTION INTRAVENOUS at 20:23

## 2022-01-19 RX ADMIN — LEVETIRACETAM 500 MG: 500 TABLET, FILM COATED ORAL at 20:22

## 2022-01-19 RX ADMIN — SODIUM CHLORIDE, POTASSIUM CHLORIDE, SODIUM LACTATE AND CALCIUM CHLORIDE 75 ML/HR: 600; 310; 30; 20 INJECTION, SOLUTION INTRAVENOUS at 18:15

## 2022-01-19 RX ADMIN — TIZANIDINE 2 MG: 4 TABLET ORAL at 10:16

## 2022-01-19 RX ADMIN — SENNOSIDES AND DOCUSATE SODIUM 2 TABLET: 50; 8.6 TABLET ORAL at 20:22

## 2022-01-19 RX ADMIN — TAZOBACTAM SODIUM AND PIPERACILLIN SODIUM 3.38 G: 375; 3 INJECTION, SOLUTION INTRAVENOUS at 10:15

## 2022-01-19 RX ADMIN — ASPIRIN 81 MG: 81 TABLET, COATED ORAL at 09:49

## 2022-01-19 RX ADMIN — LEVETIRACETAM 500 MG: 500 TABLET, FILM COATED ORAL at 10:14

## 2022-01-19 RX ADMIN — QUETIAPINE FUMARATE 100 MG: 200 TABLET ORAL at 20:22

## 2022-01-19 RX ADMIN — HEPARIN SODIUM 5000 UNITS: 5000 INJECTION, SOLUTION INTRAVENOUS; SUBCUTANEOUS at 22:08

## 2022-01-19 RX ADMIN — TAZOBACTAM SODIUM AND PIPERACILLIN SODIUM 3.38 G: 375; 3 INJECTION, SOLUTION INTRAVENOUS at 18:15

## 2022-01-19 RX ADMIN — ACETAMINOPHEN 650 MG: 325 TABLET ORAL at 15:24

## 2022-01-19 RX ADMIN — SODIUM CHLORIDE, PRESERVATIVE FREE 10 ML: 5 INJECTION INTRAVENOUS at 10:17

## 2022-01-19 RX ADMIN — VANCOMYCIN HYDROCHLORIDE 1250 MG: 5 INJECTION, POWDER, LYOPHILIZED, FOR SOLUTION INTRAVENOUS at 13:48

## 2022-01-20 ENCOUNTER — APPOINTMENT (OUTPATIENT)
Dept: CARDIOLOGY | Facility: HOSPITAL | Age: 72
End: 2022-01-20

## 2022-01-20 LAB
ALBUMIN SERPL-MCNC: 2.5 G/DL (ref 3.5–5.2)
ANION GAP SERPL CALCULATED.3IONS-SCNC: 9.4 MMOL/L (ref 5–15)
BASOPHILS # BLD AUTO: 0.04 10*3/MM3 (ref 0–0.2)
BASOPHILS NFR BLD AUTO: 0.5 % (ref 0–1.5)
BUN SERPL-MCNC: 14 MG/DL (ref 8–23)
BUN/CREAT SERPL: 14.1 (ref 7–25)
CALCIUM SPEC-SCNC: 8.5 MG/DL (ref 8.6–10.5)
CHLORIDE SERPL-SCNC: 106 MMOL/L (ref 98–107)
CO2 SERPL-SCNC: 21.6 MMOL/L (ref 22–29)
CREAT SERPL-MCNC: 0.99 MG/DL (ref 0.57–1)
DEPRECATED RDW RBC AUTO: 40.8 FL (ref 37–54)
EOSINOPHIL # BLD AUTO: 0.15 10*3/MM3 (ref 0–0.4)
EOSINOPHIL NFR BLD AUTO: 2 % (ref 0.3–6.2)
ERYTHROCYTE [DISTWIDTH] IN BLOOD BY AUTOMATED COUNT: 13.9 % (ref 12.3–15.4)
GFR SERPL CREATININE-BSD FRML MDRD: 55 ML/MIN/1.73
GLUCOSE SERPL-MCNC: 154 MG/DL (ref 65–99)
HCT VFR BLD AUTO: 32.1 % (ref 34–46.6)
HGB BLD-MCNC: 10.3 G/DL (ref 12–15.9)
IMM GRANULOCYTES # BLD AUTO: 0.07 10*3/MM3 (ref 0–0.05)
IMM GRANULOCYTES NFR BLD AUTO: 0.9 % (ref 0–0.5)
LYMPHOCYTES # BLD AUTO: 1.68 10*3/MM3 (ref 0.7–3.1)
LYMPHOCYTES NFR BLD AUTO: 22.7 % (ref 19.6–45.3)
MAGNESIUM SERPL-MCNC: 1.7 MG/DL (ref 1.6–2.4)
MCH RBC QN AUTO: 25.9 PG (ref 26.6–33)
MCHC RBC AUTO-ENTMCNC: 32.1 G/DL (ref 31.5–35.7)
MCV RBC AUTO: 80.7 FL (ref 79–97)
MONOCYTES # BLD AUTO: 0.52 10*3/MM3 (ref 0.1–0.9)
MONOCYTES NFR BLD AUTO: 7 % (ref 5–12)
NEUTROPHILS NFR BLD AUTO: 4.95 10*3/MM3 (ref 1.7–7)
NEUTROPHILS NFR BLD AUTO: 66.9 % (ref 42.7–76)
NRBC BLD AUTO-RTO: 0 /100 WBC (ref 0–0.2)
NT-PROBNP SERPL-MCNC: 9200 PG/ML (ref 0–900)
PHOSPHATE SERPL-MCNC: 3 MG/DL (ref 2.5–4.5)
PLATELET # BLD AUTO: 287 10*3/MM3 (ref 140–450)
PMV BLD AUTO: 9.5 FL (ref 6–12)
POTASSIUM SERPL-SCNC: 3.1 MMOL/L (ref 3.5–5.2)
QT INTERVAL: 318 MS
RBC # BLD AUTO: 3.98 10*6/MM3 (ref 3.77–5.28)
SODIUM SERPL-SCNC: 137 MMOL/L (ref 136–145)
WBC NRBC COR # BLD: 7.41 10*3/MM3 (ref 3.4–10.8)

## 2022-01-20 PROCEDURE — 25010000002 VANCOMYCIN 5 G RECONSTITUTED SOLUTION: Performed by: INTERNAL MEDICINE

## 2022-01-20 PROCEDURE — 25010000002 HEPARIN (PORCINE) PER 1000 UNITS: Performed by: FAMILY MEDICINE

## 2022-01-20 PROCEDURE — 83735 ASSAY OF MAGNESIUM: CPT | Performed by: INTERNAL MEDICINE

## 2022-01-20 PROCEDURE — 25010000002 PIPERACILLIN SOD-TAZOBACTAM PER 1 G: Performed by: FAMILY MEDICINE

## 2022-01-20 PROCEDURE — 80069 RENAL FUNCTION PANEL: CPT | Performed by: INTERNAL MEDICINE

## 2022-01-20 PROCEDURE — 25010000002 KETOROLAC TROMETHAMINE PER 15 MG: Performed by: PHYSICIAN ASSISTANT

## 2022-01-20 PROCEDURE — 85025 COMPLETE CBC W/AUTO DIFF WBC: CPT | Performed by: INTERNAL MEDICINE

## 2022-01-20 PROCEDURE — 83880 ASSAY OF NATRIURETIC PEPTIDE: CPT | Performed by: INTERNAL MEDICINE

## 2022-01-20 PROCEDURE — 93005 ELECTROCARDIOGRAM TRACING: CPT | Performed by: INTERNAL MEDICINE

## 2022-01-20 PROCEDURE — 99233 SBSQ HOSP IP/OBS HIGH 50: CPT | Performed by: INTERNAL MEDICINE

## 2022-01-20 PROCEDURE — 93306 TTE W/DOPPLER COMPLETE: CPT

## 2022-01-20 RX ORDER — KETOROLAC TROMETHAMINE 15 MG/ML
15 INJECTION, SOLUTION INTRAMUSCULAR; INTRAVENOUS ONCE
Status: COMPLETED | OUTPATIENT
Start: 2022-01-20 | End: 2022-01-20

## 2022-01-20 RX ORDER — POTASSIUM CHLORIDE 750 MG/1
20 CAPSULE, EXTENDED RELEASE ORAL ONCE
Status: COMPLETED | OUTPATIENT
Start: 2022-01-20 | End: 2022-01-20

## 2022-01-20 RX ADMIN — SENNOSIDES AND DOCUSATE SODIUM 2 TABLET: 50; 8.6 TABLET ORAL at 08:36

## 2022-01-20 RX ADMIN — KETOROLAC TROMETHAMINE 15 MG: 15 INJECTION, SOLUTION INTRAMUSCULAR; INTRAVENOUS at 02:57

## 2022-01-20 RX ADMIN — BUSPIRONE HYDROCHLORIDE 15 MG: 15 TABLET ORAL at 08:36

## 2022-01-20 RX ADMIN — TAZOBACTAM SODIUM AND PIPERACILLIN SODIUM 3.38 G: 375; 3 INJECTION, SOLUTION INTRAVENOUS at 23:55

## 2022-01-20 RX ADMIN — SODIUM CHLORIDE, POTASSIUM CHLORIDE, SODIUM LACTATE AND CALCIUM CHLORIDE 75 ML/HR: 600; 310; 30; 20 INJECTION, SOLUTION INTRAVENOUS at 08:34

## 2022-01-20 RX ADMIN — VANCOMYCIN HYDROCHLORIDE 1250 MG: 5 INJECTION, POWDER, LYOPHILIZED, FOR SOLUTION INTRAVENOUS at 14:33

## 2022-01-20 RX ADMIN — SODIUM CHLORIDE, PRESERVATIVE FREE 10 ML: 5 INJECTION INTRAVENOUS at 22:01

## 2022-01-20 RX ADMIN — POTASSIUM CHLORIDE 20 MEQ: 750 CAPSULE, EXTENDED RELEASE ORAL at 08:45

## 2022-01-20 RX ADMIN — LEVETIRACETAM 500 MG: 500 TABLET, FILM COATED ORAL at 08:37

## 2022-01-20 RX ADMIN — QUETIAPINE FUMARATE 100 MG: 200 TABLET ORAL at 21:56

## 2022-01-20 RX ADMIN — BUSPIRONE HYDROCHLORIDE 15 MG: 15 TABLET ORAL at 17:08

## 2022-01-20 RX ADMIN — ACETAMINOPHEN 650 MG: 325 TABLET ORAL at 21:56

## 2022-01-20 RX ADMIN — ASPIRIN 81 MG: 81 TABLET, COATED ORAL at 08:36

## 2022-01-20 RX ADMIN — TAZOBACTAM SODIUM AND PIPERACILLIN SODIUM 3.38 G: 375; 3 INJECTION, SOLUTION INTRAVENOUS at 08:38

## 2022-01-20 RX ADMIN — ATENOLOL 25 MG: 25 TABLET ORAL at 08:36

## 2022-01-20 RX ADMIN — BUSPIRONE HYDROCHLORIDE 15 MG: 15 TABLET ORAL at 21:56

## 2022-01-20 RX ADMIN — SODIUM CHLORIDE, PRESERVATIVE FREE 10 ML: 5 INJECTION INTRAVENOUS at 08:37

## 2022-01-20 RX ADMIN — BUPROPION HYDROCHLORIDE 150 MG: 150 TABLET, EXTENDED RELEASE ORAL at 08:34

## 2022-01-20 RX ADMIN — TAZOBACTAM SODIUM AND PIPERACILLIN SODIUM 3.38 G: 375; 3 INJECTION, SOLUTION INTRAVENOUS at 17:08

## 2022-01-20 RX ADMIN — LEVETIRACETAM 500 MG: 500 TABLET, FILM COATED ORAL at 21:56

## 2022-01-20 RX ADMIN — HEPARIN SODIUM 5000 UNITS: 5000 INJECTION, SOLUTION INTRAVENOUS; SUBCUTANEOUS at 05:52

## 2022-01-20 RX ADMIN — TAZOBACTAM SODIUM AND PIPERACILLIN SODIUM 3.38 G: 375; 3 INJECTION, SOLUTION INTRAVENOUS at 00:26

## 2022-01-20 RX ADMIN — Medication 5 MG: at 22:34

## 2022-01-20 RX ADMIN — HEPARIN SODIUM 5000 UNITS: 5000 INJECTION, SOLUTION INTRAVENOUS; SUBCUTANEOUS at 14:33

## 2022-01-20 RX ADMIN — HEPARIN SODIUM 5000 UNITS: 5000 INJECTION, SOLUTION INTRAVENOUS; SUBCUTANEOUS at 21:56

## 2022-01-21 LAB
ANION GAP SERPL CALCULATED.3IONS-SCNC: 10 MMOL/L (ref 5–15)
BH CV ECHO MEAS - AO ROOT DIAM: 2.9 CM
BH CV ECHO MEAS - EDV(MOD-SP2): 75 ML
BH CV ECHO MEAS - EDV(MOD-SP4): 43 ML
BH CV ECHO MEAS - EF(MOD-BP): 51 %
BH CV ECHO MEAS - ESV(MOD-SP2): 33 ML
BH CV ECHO MEAS - ESV(MOD-SP4): 21 ML
BH CV ECHO MEAS - IVSD: 1.1 CM
BH CV ECHO MEAS - LA DIMENSION(2D): 4.5 CM
BH CV ECHO MEAS - LVIDD: 4 CM
BH CV ECHO MEAS - LVIDS: 2.5 CM
BH CV ECHO MEAS - LVOT DIAM: 2 CM
BH CV ECHO MEAS - LVPWD: 1.4 CM
BH CV ECHO MEAS - RAP SYSTOLE: 3 MMHG
BH CV ECHO MEAS - RVDD: 2.5 CM
BH CV ECHO MEAS - RVSP: 20 MMHG
BH CV ECHO MEAS - TR MAX PG: 17 MMHG
BH CV ECHO MEAS - TR MAX VEL: 206 CM/SEC
BUN SERPL-MCNC: 13 MG/DL (ref 8–23)
BUN/CREAT SERPL: 9.8 (ref 7–25)
CALCIUM SPEC-SCNC: 8.9 MG/DL (ref 8.6–10.5)
CHLORIDE SERPL-SCNC: 104 MMOL/L (ref 98–107)
CO2 SERPL-SCNC: 22 MMOL/L (ref 22–29)
CREAT SERPL-MCNC: 1.33 MG/DL (ref 0.57–1)
GFR SERPL CREATININE-BSD FRML MDRD: 39 ML/MIN/1.73
GLUCOSE SERPL-MCNC: 189 MG/DL (ref 65–99)
IVRT: 92 MSEC
LEFT ATRIUM VOLUME INDEX: 37.9 ML/M2
MAXIMAL PREDICTED HEART RATE: 149 BPM
POTASSIUM SERPL-SCNC: 3.8 MMOL/L (ref 3.5–5.2)
SODIUM SERPL-SCNC: 136 MMOL/L (ref 136–145)
STRESS TARGET HR: 127 BPM
WHOLE BLOOD HOLD SPECIMEN: NORMAL

## 2022-01-21 PROCEDURE — 93005 ELECTROCARDIOGRAM TRACING: CPT | Performed by: HOSPITALIST

## 2022-01-21 PROCEDURE — 25010000002 PIPERACILLIN SOD-TAZOBACTAM PER 1 G: Performed by: FAMILY MEDICINE

## 2022-01-21 PROCEDURE — 99231 SBSQ HOSP IP/OBS SF/LOW 25: CPT | Performed by: SURGERY

## 2022-01-21 PROCEDURE — 25010000002 MORPHINE PER 10 MG: Performed by: FAMILY MEDICINE

## 2022-01-21 PROCEDURE — 99233 SBSQ HOSP IP/OBS HIGH 50: CPT | Performed by: INTERNAL MEDICINE

## 2022-01-21 PROCEDURE — 80048 BASIC METABOLIC PNL TOTAL CA: CPT | Performed by: INTERNAL MEDICINE

## 2022-01-21 PROCEDURE — 25010000002 VANCOMYCIN 5 G RECONSTITUTED SOLUTION: Performed by: INTERNAL MEDICINE

## 2022-01-21 PROCEDURE — 25010000002 HEPARIN (PORCINE) PER 1000 UNITS: Performed by: FAMILY MEDICINE

## 2022-01-21 RX ORDER — MAGNESIUM SULFATE HEPTAHYDRATE 40 MG/ML
2 INJECTION, SOLUTION INTRAVENOUS ONCE
Status: COMPLETED | OUTPATIENT
Start: 2022-01-22 | End: 2022-01-22

## 2022-01-21 RX ADMIN — BUSPIRONE HYDROCHLORIDE 15 MG: 15 TABLET ORAL at 17:52

## 2022-01-21 RX ADMIN — TAZOBACTAM SODIUM AND PIPERACILLIN SODIUM 3.38 G: 375; 3 INJECTION, SOLUTION INTRAVENOUS at 17:52

## 2022-01-21 RX ADMIN — QUETIAPINE FUMARATE 100 MG: 200 TABLET ORAL at 21:54

## 2022-01-21 RX ADMIN — MORPHINE SULFATE 1 MG: 2 INJECTION, SOLUTION INTRAMUSCULAR; INTRAVENOUS at 21:54

## 2022-01-21 RX ADMIN — TAZOBACTAM SODIUM AND PIPERACILLIN SODIUM 3.38 G: 375; 3 INJECTION, SOLUTION INTRAVENOUS at 09:34

## 2022-01-21 RX ADMIN — BUSPIRONE HYDROCHLORIDE 15 MG: 15 TABLET ORAL at 21:54

## 2022-01-21 RX ADMIN — HEPARIN SODIUM 5000 UNITS: 5000 INJECTION, SOLUTION INTRAVENOUS; SUBCUTANEOUS at 21:54

## 2022-01-21 RX ADMIN — ASPIRIN 81 MG: 81 TABLET, COATED ORAL at 09:33

## 2022-01-21 RX ADMIN — BUPROPION HYDROCHLORIDE 150 MG: 150 TABLET, EXTENDED RELEASE ORAL at 09:33

## 2022-01-21 RX ADMIN — LEVETIRACETAM 500 MG: 500 TABLET, FILM COATED ORAL at 09:33

## 2022-01-21 RX ADMIN — ATENOLOL 25 MG: 25 TABLET ORAL at 09:34

## 2022-01-21 RX ADMIN — TAZOBACTAM SODIUM AND PIPERACILLIN SODIUM 3.38 G: 375; 3 INJECTION, SOLUTION INTRAVENOUS at 23:30

## 2022-01-21 RX ADMIN — VANCOMYCIN HYDROCHLORIDE 1250 MG: 5 INJECTION, POWDER, LYOPHILIZED, FOR SOLUTION INTRAVENOUS at 13:36

## 2022-01-21 RX ADMIN — HEPARIN SODIUM 5000 UNITS: 5000 INJECTION, SOLUTION INTRAVENOUS; SUBCUTANEOUS at 06:21

## 2022-01-21 RX ADMIN — Medication 5 MG: at 21:54

## 2022-01-21 RX ADMIN — FENTANYL TRANSDERMAL 1 PATCH: 50 PATCH, EXTENDED RELEASE TRANSDERMAL at 19:52

## 2022-01-21 RX ADMIN — ACETAMINOPHEN 650 MG: 325 TABLET ORAL at 06:21

## 2022-01-21 RX ADMIN — MORPHINE SULFATE 1 MG: 2 INJECTION, SOLUTION INTRAMUSCULAR; INTRAVENOUS at 13:40

## 2022-01-21 RX ADMIN — BUSPIRONE HYDROCHLORIDE 15 MG: 15 TABLET ORAL at 09:34

## 2022-01-21 RX ADMIN — SODIUM CHLORIDE, PRESERVATIVE FREE 10 ML: 5 INJECTION INTRAVENOUS at 21:54

## 2022-01-21 RX ADMIN — SODIUM CHLORIDE, PRESERVATIVE FREE 10 ML: 5 INJECTION INTRAVENOUS at 10:46

## 2022-01-21 RX ADMIN — HEPARIN SODIUM 5000 UNITS: 5000 INJECTION, SOLUTION INTRAVENOUS; SUBCUTANEOUS at 13:35

## 2022-01-21 RX ADMIN — MORPHINE SULFATE 1 MG: 2 INJECTION, SOLUTION INTRAMUSCULAR; INTRAVENOUS at 04:17

## 2022-01-21 RX ADMIN — LEVETIRACETAM 500 MG: 500 TABLET, FILM COATED ORAL at 21:54

## 2022-01-21 RX ADMIN — MORPHINE SULFATE 1 MG: 2 INJECTION, SOLUTION INTRAMUSCULAR; INTRAVENOUS at 17:52

## 2022-01-22 LAB
ALBUMIN SERPL-MCNC: 2.3 G/DL (ref 3.5–5.2)
ANION GAP SERPL CALCULATED.3IONS-SCNC: 11.8 MMOL/L (ref 5–15)
BACTERIA SPEC AEROBE CULT: NORMAL
BACTERIA SPEC AEROBE CULT: NORMAL
BASOPHILS # BLD AUTO: 0.07 10*3/MM3 (ref 0–0.2)
BASOPHILS NFR BLD AUTO: 0.7 % (ref 0–1.5)
BUN SERPL-MCNC: 14 MG/DL (ref 8–23)
BUN/CREAT SERPL: 10.9 (ref 7–25)
CALCIUM SPEC-SCNC: 8.7 MG/DL (ref 8.6–10.5)
CHLORIDE SERPL-SCNC: 105 MMOL/L (ref 98–107)
CO2 SERPL-SCNC: 20.2 MMOL/L (ref 22–29)
CREAT SERPL-MCNC: 1.29 MG/DL (ref 0.57–1)
DEPRECATED RDW RBC AUTO: 43 FL (ref 37–54)
EOSINOPHIL # BLD AUTO: 0.21 10*3/MM3 (ref 0–0.4)
EOSINOPHIL NFR BLD AUTO: 2 % (ref 0.3–6.2)
ERYTHROCYTE [DISTWIDTH] IN BLOOD BY AUTOMATED COUNT: 14.3 % (ref 12.3–15.4)
GFR SERPL CREATININE-BSD FRML MDRD: 41 ML/MIN/1.73
GLUCOSE SERPL-MCNC: 166 MG/DL (ref 65–99)
HCT VFR BLD AUTO: 34 % (ref 34–46.6)
HGB BLD-MCNC: 10.7 G/DL (ref 12–15.9)
IMM GRANULOCYTES # BLD AUTO: 0.07 10*3/MM3 (ref 0–0.05)
IMM GRANULOCYTES NFR BLD AUTO: 0.7 % (ref 0–0.5)
LYMPHOCYTES # BLD AUTO: 2.04 10*3/MM3 (ref 0.7–3.1)
LYMPHOCYTES NFR BLD AUTO: 19 % (ref 19.6–45.3)
MAGNESIUM SERPL-MCNC: 2.1 MG/DL (ref 1.6–2.4)
MCH RBC QN AUTO: 26.5 PG (ref 26.6–33)
MCHC RBC AUTO-ENTMCNC: 31.5 G/DL (ref 31.5–35.7)
MCV RBC AUTO: 84.2 FL (ref 79–97)
MONOCYTES # BLD AUTO: 0.72 10*3/MM3 (ref 0.1–0.9)
MONOCYTES NFR BLD AUTO: 6.7 % (ref 5–12)
NEUTROPHILS NFR BLD AUTO: 7.65 10*3/MM3 (ref 1.7–7)
NEUTROPHILS NFR BLD AUTO: 70.9 % (ref 42.7–76)
NRBC BLD AUTO-RTO: 0 /100 WBC (ref 0–0.2)
PHOSPHATE SERPL-MCNC: 3.4 MG/DL (ref 2.5–4.5)
PLATELET # BLD AUTO: 310 10*3/MM3 (ref 140–450)
PMV BLD AUTO: 9.6 FL (ref 6–12)
POTASSIUM SERPL-SCNC: 3.7 MMOL/L (ref 3.5–5.2)
QT INTERVAL: 313 MS
RBC # BLD AUTO: 4.04 10*6/MM3 (ref 3.77–5.28)
SODIUM SERPL-SCNC: 137 MMOL/L (ref 136–145)
VANCOMYCIN SERPL-MCNC: 23.23 MCG/ML (ref 5–40)
WBC NRBC COR # BLD: 10.76 10*3/MM3 (ref 3.4–10.8)

## 2022-01-22 PROCEDURE — 83735 ASSAY OF MAGNESIUM: CPT | Performed by: INTERNAL MEDICINE

## 2022-01-22 PROCEDURE — 25010000002 MAGNESIUM SULFATE 2 GM/50ML SOLUTION: Performed by: HOSPITALIST

## 2022-01-22 PROCEDURE — 25010000002 VANCOMYCIN 5 G RECONSTITUTED SOLUTION: Performed by: INTERNAL MEDICINE

## 2022-01-22 PROCEDURE — 85025 COMPLETE CBC W/AUTO DIFF WBC: CPT | Performed by: INTERNAL MEDICINE

## 2022-01-22 PROCEDURE — 25010000002 HEPARIN (PORCINE) PER 1000 UNITS: Performed by: FAMILY MEDICINE

## 2022-01-22 PROCEDURE — 80202 ASSAY OF VANCOMYCIN: CPT | Performed by: INTERNAL MEDICINE

## 2022-01-22 PROCEDURE — 80069 RENAL FUNCTION PANEL: CPT | Performed by: INTERNAL MEDICINE

## 2022-01-22 PROCEDURE — 99233 SBSQ HOSP IP/OBS HIGH 50: CPT | Performed by: INTERNAL MEDICINE

## 2022-01-22 PROCEDURE — 25010000002 PIPERACILLIN SOD-TAZOBACTAM PER 1 G: Performed by: FAMILY MEDICINE

## 2022-01-22 PROCEDURE — 25010000002 MORPHINE PER 10 MG: Performed by: FAMILY MEDICINE

## 2022-01-22 RX ORDER — ATORVASTATIN CALCIUM 20 MG/1
20 TABLET, FILM COATED ORAL NIGHTLY
Status: DISCONTINUED | OUTPATIENT
Start: 2022-01-22 | End: 2022-01-23

## 2022-01-22 RX ORDER — DILTIAZEM HCL IN NACL,ISO-OSM 125 MG/125
5-15 PLASTIC BAG, INJECTION (ML) INTRAVENOUS
Status: DISCONTINUED | OUTPATIENT
Start: 2022-01-22 | End: 2022-01-26

## 2022-01-22 RX ADMIN — HEPARIN SODIUM 5000 UNITS: 5000 INJECTION, SOLUTION INTRAVENOUS; SUBCUTANEOUS at 13:59

## 2022-01-22 RX ADMIN — HEPARIN SODIUM 5000 UNITS: 5000 INJECTION, SOLUTION INTRAVENOUS; SUBCUTANEOUS at 21:58

## 2022-01-22 RX ADMIN — METOPROLOL TARTRATE 2.5 MG: 5 INJECTION INTRAVENOUS at 01:06

## 2022-01-22 RX ADMIN — LEVETIRACETAM 500 MG: 500 TABLET, FILM COATED ORAL at 08:55

## 2022-01-22 RX ADMIN — LEVETIRACETAM 500 MG: 500 TABLET, FILM COATED ORAL at 21:59

## 2022-01-22 RX ADMIN — MORPHINE SULFATE 1 MG: 2 INJECTION, SOLUTION INTRAMUSCULAR; INTRAVENOUS at 02:03

## 2022-01-22 RX ADMIN — SENNOSIDES AND DOCUSATE SODIUM 2 TABLET: 50; 8.6 TABLET ORAL at 22:00

## 2022-01-22 RX ADMIN — QUETIAPINE FUMARATE 100 MG: 200 TABLET ORAL at 21:59

## 2022-01-22 RX ADMIN — BUSPIRONE HYDROCHLORIDE 15 MG: 15 TABLET ORAL at 08:55

## 2022-01-22 RX ADMIN — BUSPIRONE HYDROCHLORIDE 15 MG: 15 TABLET ORAL at 21:59

## 2022-01-22 RX ADMIN — BUSPIRONE HYDROCHLORIDE 15 MG: 15 TABLET ORAL at 15:29

## 2022-01-22 RX ADMIN — BUPROPION HYDROCHLORIDE 150 MG: 150 TABLET, EXTENDED RELEASE ORAL at 08:55

## 2022-01-22 RX ADMIN — METOPROLOL TARTRATE 5 MG: 5 INJECTION INTRAVENOUS at 02:03

## 2022-01-22 RX ADMIN — TAZOBACTAM SODIUM AND PIPERACILLIN SODIUM 3.38 G: 375; 3 INJECTION, SOLUTION INTRAVENOUS at 08:55

## 2022-01-22 RX ADMIN — SODIUM CHLORIDE, PRESERVATIVE FREE 10 ML: 5 INJECTION INTRAVENOUS at 22:00

## 2022-01-22 RX ADMIN — VANCOMYCIN HYDROCHLORIDE 1000 MG: 5 INJECTION, POWDER, LYOPHILIZED, FOR SOLUTION INTRAVENOUS at 21:55

## 2022-01-22 RX ADMIN — ASPIRIN 81 MG: 81 TABLET, COATED ORAL at 08:55

## 2022-01-22 RX ADMIN — MORPHINE SULFATE 1 MG: 2 INJECTION, SOLUTION INTRAMUSCULAR; INTRAVENOUS at 06:22

## 2022-01-22 RX ADMIN — SODIUM CHLORIDE, POTASSIUM CHLORIDE, SODIUM LACTATE AND CALCIUM CHLORIDE 125 ML/HR: 600; 310; 30; 20 INJECTION, SOLUTION INTRAVENOUS at 02:35

## 2022-01-22 RX ADMIN — TAZOBACTAM SODIUM AND PIPERACILLIN SODIUM 3.38 G: 375; 3 INJECTION, SOLUTION INTRAVENOUS at 15:29

## 2022-01-22 RX ADMIN — METOPROLOL TARTRATE 2.5 MG: 5 INJECTION INTRAVENOUS at 00:21

## 2022-01-22 RX ADMIN — MAGNESIUM SULFATE 2 G: 2 INJECTION INTRAVENOUS at 00:27

## 2022-01-22 RX ADMIN — HEPARIN SODIUM 5000 UNITS: 5000 INJECTION, SOLUTION INTRAVENOUS; SUBCUTANEOUS at 06:22

## 2022-01-22 RX ADMIN — ACETAMINOPHEN 650 MG: 325 TABLET ORAL at 08:55

## 2022-01-22 RX ADMIN — Medication 5 MG/HR: at 06:29

## 2022-01-22 RX ADMIN — Medication 10 MG/HR: at 16:30

## 2022-01-22 RX ADMIN — SODIUM CHLORIDE, PRESERVATIVE FREE 10 ML: 5 INJECTION INTRAVENOUS at 08:55

## 2022-01-23 LAB
ALBUMIN SERPL-MCNC: 2.3 G/DL (ref 3.5–5.2)
ALBUMIN/GLOB SERPL: 0.6 G/DL
ALP SERPL-CCNC: 75 U/L (ref 39–117)
ALT SERPL W P-5'-P-CCNC: 8 U/L (ref 1–33)
ANION GAP SERPL CALCULATED.3IONS-SCNC: 10.5 MMOL/L (ref 5–15)
AST SERPL-CCNC: 13 U/L (ref 1–32)
BASOPHILS # BLD AUTO: 0.05 10*3/MM3 (ref 0–0.2)
BASOPHILS NFR BLD AUTO: 0.5 % (ref 0–1.5)
BILIRUB SERPL-MCNC: 0.2 MG/DL (ref 0–1.2)
BUN SERPL-MCNC: 10 MG/DL (ref 8–23)
BUN/CREAT SERPL: 9.8 (ref 7–25)
CALCIUM SPEC-SCNC: 8.5 MG/DL (ref 8.6–10.5)
CHLORIDE SERPL-SCNC: 107 MMOL/L (ref 98–107)
CHOLEST SERPL-MCNC: 87 MG/DL (ref 0–200)
CO2 SERPL-SCNC: 22.5 MMOL/L (ref 22–29)
CREAT SERPL-MCNC: 1.02 MG/DL (ref 0.57–1)
DEPRECATED RDW RBC AUTO: 44.3 FL (ref 37–54)
EOSINOPHIL # BLD AUTO: 0.13 10*3/MM3 (ref 0–0.4)
EOSINOPHIL NFR BLD AUTO: 1.4 % (ref 0.3–6.2)
ERYTHROCYTE [DISTWIDTH] IN BLOOD BY AUTOMATED COUNT: 14.6 % (ref 12.3–15.4)
GFR SERPL CREATININE-BSD FRML MDRD: 53 ML/MIN/1.73
GLOBULIN UR ELPH-MCNC: 3.7 GM/DL
GLUCOSE SERPL-MCNC: 171 MG/DL (ref 65–99)
HCT VFR BLD AUTO: 33.3 % (ref 34–46.6)
HDLC SERPL-MCNC: 27 MG/DL (ref 40–60)
HGB BLD-MCNC: 10.4 G/DL (ref 12–15.9)
IMM GRANULOCYTES # BLD AUTO: 0.09 10*3/MM3 (ref 0–0.05)
IMM GRANULOCYTES NFR BLD AUTO: 0.9 % (ref 0–0.5)
LDLC SERPL CALC-MCNC: 38 MG/DL (ref 0–100)
LDLC/HDLC SERPL: 1.32 {RATIO}
LYMPHOCYTES # BLD AUTO: 1.39 10*3/MM3 (ref 0.7–3.1)
LYMPHOCYTES NFR BLD AUTO: 14.4 % (ref 19.6–45.3)
MCH RBC QN AUTO: 26.7 PG (ref 26.6–33)
MCHC RBC AUTO-ENTMCNC: 31.2 G/DL (ref 31.5–35.7)
MCV RBC AUTO: 85.6 FL (ref 79–97)
MONOCYTES # BLD AUTO: 0.49 10*3/MM3 (ref 0.1–0.9)
MONOCYTES NFR BLD AUTO: 5.1 % (ref 5–12)
NEUTROPHILS NFR BLD AUTO: 7.47 10*3/MM3 (ref 1.7–7)
NEUTROPHILS NFR BLD AUTO: 77.7 % (ref 42.7–76)
NRBC BLD AUTO-RTO: 0 /100 WBC (ref 0–0.2)
PHOSPHATE SERPL-MCNC: 3.4 MG/DL (ref 2.5–4.5)
PLATELET # BLD AUTO: 249 10*3/MM3 (ref 140–450)
PMV BLD AUTO: 10 FL (ref 6–12)
POTASSIUM SERPL-SCNC: 4 MMOL/L (ref 3.5–5.2)
PROT SERPL-MCNC: 6 G/DL (ref 6–8.5)
RBC # BLD AUTO: 3.89 10*6/MM3 (ref 3.77–5.28)
SODIUM SERPL-SCNC: 140 MMOL/L (ref 136–145)
TRIGL SERPL-MCNC: 122 MG/DL (ref 0–150)
TSH SERPL DL<=0.05 MIU/L-ACNC: 4.08 UIU/ML (ref 0.27–4.2)
VLDLC SERPL-MCNC: 22 MG/DL (ref 5–40)
WBC NRBC COR # BLD: 9.62 10*3/MM3 (ref 3.4–10.8)

## 2022-01-23 PROCEDURE — 25010000002 MORPHINE PER 10 MG: Performed by: FAMILY MEDICINE

## 2022-01-23 PROCEDURE — 80061 LIPID PANEL: CPT | Performed by: SPECIALIST

## 2022-01-23 PROCEDURE — 25010000002 HEPARIN (PORCINE) PER 1000 UNITS: Performed by: FAMILY MEDICINE

## 2022-01-23 PROCEDURE — 80053 COMPREHEN METABOLIC PANEL: CPT | Performed by: INTERNAL MEDICINE

## 2022-01-23 PROCEDURE — 85025 COMPLETE CBC W/AUTO DIFF WBC: CPT | Performed by: INTERNAL MEDICINE

## 2022-01-23 PROCEDURE — 25010000002 PIPERACILLIN SOD-TAZOBACTAM PER 1 G: Performed by: FAMILY MEDICINE

## 2022-01-23 PROCEDURE — 84443 ASSAY THYROID STIM HORMONE: CPT | Performed by: INTERNAL MEDICINE

## 2022-01-23 PROCEDURE — 99233 SBSQ HOSP IP/OBS HIGH 50: CPT | Performed by: INTERNAL MEDICINE

## 2022-01-23 PROCEDURE — 25010000002 VANCOMYCIN 5 G RECONSTITUTED SOLUTION: Performed by: INTERNAL MEDICINE

## 2022-01-23 PROCEDURE — 84100 ASSAY OF PHOSPHORUS: CPT | Performed by: INTERNAL MEDICINE

## 2022-01-23 RX ORDER — ATORVASTATIN CALCIUM 10 MG/1
10 TABLET, FILM COATED ORAL NIGHTLY
Status: DISCONTINUED | OUTPATIENT
Start: 2022-01-23 | End: 2022-01-29 | Stop reason: HOSPADM

## 2022-01-23 RX ADMIN — HEPARIN SODIUM 5000 UNITS: 5000 INJECTION, SOLUTION INTRAVENOUS; SUBCUTANEOUS at 13:20

## 2022-01-23 RX ADMIN — HEPARIN SODIUM 5000 UNITS: 5000 INJECTION, SOLUTION INTRAVENOUS; SUBCUTANEOUS at 05:56

## 2022-01-23 RX ADMIN — ATORVASTATIN CALCIUM 10 MG: 10 TABLET, FILM COATED ORAL at 21:12

## 2022-01-23 RX ADMIN — SODIUM CHLORIDE, PRESERVATIVE FREE 10 ML: 5 INJECTION INTRAVENOUS at 21:12

## 2022-01-23 RX ADMIN — MORPHINE SULFATE 1 MG: 2 INJECTION, SOLUTION INTRAMUSCULAR; INTRAVENOUS at 03:21

## 2022-01-23 RX ADMIN — Medication 10 MG/HR: at 05:49

## 2022-01-23 RX ADMIN — TAZOBACTAM SODIUM AND PIPERACILLIN SODIUM 3.38 G: 375; 3 INJECTION, SOLUTION INTRAVENOUS at 17:42

## 2022-01-23 RX ADMIN — TAZOBACTAM SODIUM AND PIPERACILLIN SODIUM 3.38 G: 375; 3 INJECTION, SOLUTION INTRAVENOUS at 23:23

## 2022-01-23 RX ADMIN — VANCOMYCIN HYDROCHLORIDE 1000 MG: 5 INJECTION, POWDER, LYOPHILIZED, FOR SOLUTION INTRAVENOUS at 21:36

## 2022-01-23 RX ADMIN — TAZOBACTAM SODIUM AND PIPERACILLIN SODIUM 3.38 G: 375; 3 INJECTION, SOLUTION INTRAVENOUS at 03:17

## 2022-01-23 RX ADMIN — BUSPIRONE HYDROCHLORIDE 15 MG: 15 TABLET ORAL at 21:12

## 2022-01-23 RX ADMIN — QUETIAPINE FUMARATE 100 MG: 200 TABLET ORAL at 21:12

## 2022-01-23 RX ADMIN — BUSPIRONE HYDROCHLORIDE 15 MG: 15 TABLET ORAL at 17:42

## 2022-01-23 RX ADMIN — Medication 12.5 MG/HR: at 21:36

## 2022-01-23 RX ADMIN — HEPARIN SODIUM 5000 UNITS: 5000 INJECTION, SOLUTION INTRAVENOUS; SUBCUTANEOUS at 21:36

## 2022-01-23 RX ADMIN — ASPIRIN 81 MG: 81 TABLET, COATED ORAL at 08:01

## 2022-01-23 RX ADMIN — TAZOBACTAM SODIUM AND PIPERACILLIN SODIUM 3.38 G: 375; 3 INJECTION, SOLUTION INTRAVENOUS at 08:00

## 2022-01-23 RX ADMIN — LEVETIRACETAM 500 MG: 500 TABLET, FILM COATED ORAL at 21:12

## 2022-01-23 RX ADMIN — BUSPIRONE HYDROCHLORIDE 15 MG: 15 TABLET ORAL at 08:00

## 2022-01-23 RX ADMIN — SENNOSIDES AND DOCUSATE SODIUM 2 TABLET: 50; 8.6 TABLET ORAL at 21:12

## 2022-01-23 RX ADMIN — SODIUM CHLORIDE, PRESERVATIVE FREE 10 ML: 5 INJECTION INTRAVENOUS at 08:02

## 2022-01-23 RX ADMIN — MORPHINE SULFATE 1 MG: 2 INJECTION, SOLUTION INTRAMUSCULAR; INTRAVENOUS at 19:43

## 2022-01-23 RX ADMIN — Medication 12.5 MG/HR: at 18:16

## 2022-01-23 RX ADMIN — MORPHINE SULFATE 1 MG: 2 INJECTION, SOLUTION INTRAMUSCULAR; INTRAVENOUS at 08:01

## 2022-01-23 RX ADMIN — BUPROPION HYDROCHLORIDE 150 MG: 150 TABLET, EXTENDED RELEASE ORAL at 08:00

## 2022-01-23 RX ADMIN — MORPHINE SULFATE 1 MG: 2 INJECTION, SOLUTION INTRAMUSCULAR; INTRAVENOUS at 13:20

## 2022-01-23 RX ADMIN — METOPROLOL TARTRATE 50 MG: 25 TABLET, FILM COATED ORAL at 17:42

## 2022-01-23 RX ADMIN — LEVETIRACETAM 500 MG: 500 TABLET, FILM COATED ORAL at 08:00

## 2022-01-24 ENCOUNTER — APPOINTMENT (OUTPATIENT)
Dept: NUCLEAR MEDICINE | Facility: HOSPITAL | Age: 72
End: 2022-01-24

## 2022-01-24 LAB
ABO GROUP BLD: NORMAL
ALBUMIN SERPL-MCNC: 2.6 G/DL (ref 3.5–5.2)
ANION GAP SERPL CALCULATED.3IONS-SCNC: 11.1 MMOL/L (ref 5–15)
BASOPHILS # BLD AUTO: 0.05 10*3/MM3 (ref 0–0.2)
BASOPHILS NFR BLD AUTO: 0.5 % (ref 0–1.5)
BH CV IMMEDIATE POST TECH DATA BLOOD PRESSURE: NORMAL MMHG
BH CV IMMEDIATE POST TECH DATA HEART RATE: 129 BPM
BH CV IMMEDIATE POST TECH DATA OXYGEN SATS: 94 %
BH CV REST NUCLEAR ISOTOPE DOSE: 9.1 MCI
BH CV SIX MINUTE RECOVERY TECH DATA BLOOD PRESSURE: NORMAL
BH CV SIX MINUTE RECOVERY TECH DATA HEART RATE: 117 BPM
BH CV SIX MINUTE RECOVERY TECH DATA OXYGEN SATURATION: 92 %
BH CV SIX MINUTE RECOVERY TECH DATA SYMPTOMS: NORMAL
BH CV STRESS BP STAGE 1: NORMAL
BH CV STRESS COMMENTS STAGE 1: NORMAL
BH CV STRESS DOSE REGADENOSON STAGE 1: 0.4
BH CV STRESS DURATION MIN STAGE 1: 0
BH CV STRESS DURATION SEC STAGE 1: 10
BH CV STRESS HR STAGE 1: 115
BH CV STRESS NUCLEAR ISOTOPE DOSE: 32.7 MCI
BH CV STRESS O2 STAGE 1: 91
BH CV STRESS PROTOCOL 1: NORMAL
BH CV STRESS RECOVERY BP: NORMAL MMHG
BH CV STRESS RECOVERY HR: 117 BPM
BH CV STRESS RECOVERY O2: 92 %
BH CV STRESS STAGE 1: 1
BH CV THREE MINUTE POST TECH DATA BLOOD PRESSURE: NORMAL MMHG
BH CV THREE MINUTE POST TECH DATA HEART RATE: 132 BPM
BH CV THREE MINUTE POST TECH DATA OXYGEN SATURATION: 93 %
BLD GP AB SCN SERPL QL: NEGATIVE
BUN SERPL-MCNC: 9 MG/DL (ref 8–23)
BUN/CREAT SERPL: 8.9 (ref 7–25)
CALCIUM SPEC-SCNC: 8.5 MG/DL (ref 8.6–10.5)
CHLORIDE SERPL-SCNC: 106 MMOL/L (ref 98–107)
CO2 SERPL-SCNC: 24.9 MMOL/L (ref 22–29)
CREAT SERPL-MCNC: 1.01 MG/DL (ref 0.57–1)
DEPRECATED RDW RBC AUTO: 44.2 FL (ref 37–54)
EOSINOPHIL # BLD AUTO: 0.19 10*3/MM3 (ref 0–0.4)
EOSINOPHIL NFR BLD AUTO: 2 % (ref 0.3–6.2)
ERYTHROCYTE [DISTWIDTH] IN BLOOD BY AUTOMATED COUNT: 14.6 % (ref 12.3–15.4)
GFR SERPL CREATININE-BSD FRML MDRD: 54 ML/MIN/1.73
GLUCOSE SERPL-MCNC: 142 MG/DL (ref 65–99)
HCT VFR BLD AUTO: 33.9 % (ref 34–46.6)
HGB BLD-MCNC: 10.5 G/DL (ref 12–15.9)
IMM GRANULOCYTES # BLD AUTO: 0.07 10*3/MM3 (ref 0–0.05)
IMM GRANULOCYTES NFR BLD AUTO: 0.8 % (ref 0–0.5)
LARGE PLATELETS: NORMAL
LV EF NUC BP: 78 %
LYMPHOCYTES # BLD AUTO: 1.55 10*3/MM3 (ref 0.7–3.1)
LYMPHOCYTES NFR BLD AUTO: 16.7 % (ref 19.6–45.3)
MAGNESIUM SERPL-MCNC: 1.6 MG/DL (ref 1.6–2.4)
MAXIMAL PREDICTED HEART RATE: 149 BPM
MCH RBC QN AUTO: 26.1 PG (ref 26.6–33)
MCHC RBC AUTO-ENTMCNC: 31 G/DL (ref 31.5–35.7)
MCV RBC AUTO: 84.3 FL (ref 79–97)
MONOCYTES # BLD AUTO: 0.53 10*3/MM3 (ref 0.1–0.9)
MONOCYTES NFR BLD AUTO: 5.7 % (ref 5–12)
NEUTROPHILS NFR BLD AUTO: 6.9 10*3/MM3 (ref 1.7–7)
NEUTROPHILS NFR BLD AUTO: 74.3 % (ref 42.7–76)
NRBC BLD AUTO-RTO: 0 /100 WBC (ref 0–0.2)
PERCENT MAX PREDICTED HR: 94.63 %
PHOSPHATE SERPL-MCNC: 3.6 MG/DL (ref 2.5–4.5)
PLATELET # BLD AUTO: 282 10*3/MM3 (ref 140–450)
PMV BLD AUTO: 10.1 FL (ref 6–12)
POTASSIUM SERPL-SCNC: 3.9 MMOL/L (ref 3.5–5.2)
RBC # BLD AUTO: 4.02 10*6/MM3 (ref 3.77–5.28)
RBC MORPH BLD: NORMAL
RH BLD: POSITIVE
SMALL PLATELETS BLD QL SMEAR: ADEQUATE
SODIUM SERPL-SCNC: 142 MMOL/L (ref 136–145)
STRESS BASELINE BP: NORMAL MMHG
STRESS BASELINE HR: 114 BPM
STRESS O2 SAT REST: 93 %
STRESS PERCENT HR: 111 %
STRESS POST O2 SAT PEAK: 94 %
STRESS POST PEAK BP: NORMAL MMHG
STRESS POST PEAK HR: 141 BPM
STRESS TARGET HR: 127 BPM
T&S EXPIRATION DATE: NORMAL
WBC MORPH BLD: NORMAL
WBC NRBC COR # BLD: 9.29 10*3/MM3 (ref 3.4–10.8)

## 2022-01-24 PROCEDURE — 86850 RBC ANTIBODY SCREEN: CPT | Performed by: SURGERY

## 2022-01-24 PROCEDURE — 78452 HT MUSCLE IMAGE SPECT MULT: CPT

## 2022-01-24 PROCEDURE — 25010000002 HEPARIN (PORCINE) PER 1000 UNITS: Performed by: FAMILY MEDICINE

## 2022-01-24 PROCEDURE — 0 TECHNETIUM TETROFOSMIN KIT: Performed by: FAMILY MEDICINE

## 2022-01-24 PROCEDURE — 86901 BLOOD TYPING SEROLOGIC RH(D): CPT | Performed by: SURGERY

## 2022-01-24 PROCEDURE — 83735 ASSAY OF MAGNESIUM: CPT | Performed by: INTERNAL MEDICINE

## 2022-01-24 PROCEDURE — 99233 SBSQ HOSP IP/OBS HIGH 50: CPT | Performed by: FAMILY MEDICINE

## 2022-01-24 PROCEDURE — 25010000002 VANCOMYCIN 5 G RECONSTITUTED SOLUTION: Performed by: INTERNAL MEDICINE

## 2022-01-24 PROCEDURE — 25010000002 PIPERACILLIN SOD-TAZOBACTAM PER 1 G: Performed by: FAMILY MEDICINE

## 2022-01-24 PROCEDURE — 93017 CV STRESS TEST TRACING ONLY: CPT

## 2022-01-24 PROCEDURE — 85007 BL SMEAR W/DIFF WBC COUNT: CPT | Performed by: INTERNAL MEDICINE

## 2022-01-24 PROCEDURE — A9502 TC99M TETROFOSMIN: HCPCS | Performed by: FAMILY MEDICINE

## 2022-01-24 PROCEDURE — 80069 RENAL FUNCTION PANEL: CPT | Performed by: INTERNAL MEDICINE

## 2022-01-24 PROCEDURE — 25010000002 REGADENOSON 0.4 MG/5ML SOLUTION

## 2022-01-24 PROCEDURE — 86900 BLOOD TYPING SEROLOGIC ABO: CPT | Performed by: SURGERY

## 2022-01-24 PROCEDURE — 99024 POSTOP FOLLOW-UP VISIT: CPT | Performed by: SURGERY

## 2022-01-24 PROCEDURE — 25010000002 MORPHINE PER 10 MG: Performed by: FAMILY MEDICINE

## 2022-01-24 PROCEDURE — 85025 COMPLETE CBC W/AUTO DIFF WBC: CPT | Performed by: INTERNAL MEDICINE

## 2022-01-24 RX ADMIN — MORPHINE SULFATE 1 MG: 2 INJECTION, SOLUTION INTRAMUSCULAR; INTRAVENOUS at 05:33

## 2022-01-24 RX ADMIN — BUSPIRONE HYDROCHLORIDE 15 MG: 15 TABLET ORAL at 09:06

## 2022-01-24 RX ADMIN — SODIUM CHLORIDE, POTASSIUM CHLORIDE, SODIUM LACTATE AND CALCIUM CHLORIDE 125 ML/HR: 600; 310; 30; 20 INJECTION, SOLUTION INTRAVENOUS at 09:07

## 2022-01-24 RX ADMIN — LEVETIRACETAM 500 MG: 500 TABLET, FILM COATED ORAL at 09:07

## 2022-01-24 RX ADMIN — TAZOBACTAM SODIUM AND PIPERACILLIN SODIUM 3.38 G: 375; 3 INJECTION, SOLUTION INTRAVENOUS at 23:48

## 2022-01-24 RX ADMIN — TETROFOSMIN 1 DOSE: 1.38 INJECTION, POWDER, LYOPHILIZED, FOR SOLUTION INTRAVENOUS at 11:15

## 2022-01-24 RX ADMIN — REGADENOSON 0.4 MG: 0.08 INJECTION, SOLUTION INTRAVENOUS at 13:55

## 2022-01-24 RX ADMIN — TAZOBACTAM SODIUM AND PIPERACILLIN SODIUM 3.38 G: 375; 3 INJECTION, SOLUTION INTRAVENOUS at 16:08

## 2022-01-24 RX ADMIN — HEPARIN SODIUM 5000 UNITS: 5000 INJECTION, SOLUTION INTRAVENOUS; SUBCUTANEOUS at 21:07

## 2022-01-24 RX ADMIN — BUSPIRONE HYDROCHLORIDE 15 MG: 15 TABLET ORAL at 16:08

## 2022-01-24 RX ADMIN — QUETIAPINE FUMARATE 100 MG: 200 TABLET ORAL at 21:06

## 2022-01-24 RX ADMIN — ASPIRIN 81 MG: 81 TABLET, COATED ORAL at 09:07

## 2022-01-24 RX ADMIN — BUSPIRONE HYDROCHLORIDE 15 MG: 15 TABLET ORAL at 21:06

## 2022-01-24 RX ADMIN — VANCOMYCIN HYDROCHLORIDE 1000 MG: 5 INJECTION, POWDER, LYOPHILIZED, FOR SOLUTION INTRAVENOUS at 22:17

## 2022-01-24 RX ADMIN — SENNOSIDES AND DOCUSATE SODIUM 2 TABLET: 50; 8.6 TABLET ORAL at 21:07

## 2022-01-24 RX ADMIN — TETROFOSMIN 1 DOSE: 1.38 INJECTION, POWDER, LYOPHILIZED, FOR SOLUTION INTRAVENOUS at 13:55

## 2022-01-24 RX ADMIN — FENTANYL TRANSDERMAL 1 PATCH: 50 PATCH, EXTENDED RELEASE TRANSDERMAL at 21:13

## 2022-01-24 RX ADMIN — ACETAMINOPHEN 650 MG: 325 TABLET ORAL at 02:20

## 2022-01-24 RX ADMIN — ATORVASTATIN CALCIUM 10 MG: 10 TABLET, FILM COATED ORAL at 21:06

## 2022-01-24 RX ADMIN — TAZOBACTAM SODIUM AND PIPERACILLIN SODIUM 3.38 G: 375; 3 INJECTION, SOLUTION INTRAVENOUS at 09:06

## 2022-01-24 RX ADMIN — METOPROLOL TARTRATE 50 MG: 25 TABLET, FILM COATED ORAL at 21:06

## 2022-01-24 RX ADMIN — ACETAMINOPHEN 650 MG: 325 TABLET ORAL at 11:28

## 2022-01-24 RX ADMIN — LEVETIRACETAM 500 MG: 500 TABLET, FILM COATED ORAL at 21:06

## 2022-01-24 RX ADMIN — HEPARIN SODIUM 5000 UNITS: 5000 INJECTION, SOLUTION INTRAVENOUS; SUBCUTANEOUS at 05:02

## 2022-01-24 RX ADMIN — MORPHINE SULFATE 1 MG: 2 INJECTION, SOLUTION INTRAMUSCULAR; INTRAVENOUS at 16:50

## 2022-01-24 RX ADMIN — BUPROPION HYDROCHLORIDE 150 MG: 150 TABLET, EXTENDED RELEASE ORAL at 09:07

## 2022-01-25 ENCOUNTER — ANESTHESIA EVENT (OUTPATIENT)
Dept: PERIOP | Facility: HOSPITAL | Age: 72
End: 2022-01-25

## 2022-01-25 ENCOUNTER — ANESTHESIA (OUTPATIENT)
Dept: PERIOP | Facility: HOSPITAL | Age: 72
End: 2022-01-25

## 2022-01-25 LAB
ALBUMIN SERPL-MCNC: 2.5 G/DL (ref 3.5–5.2)
ALP SERPL-CCNC: 83 U/L (ref 39–117)
ALT SERPL W P-5'-P-CCNC: 9 U/L (ref 1–33)
ANION GAP SERPL CALCULATED.3IONS-SCNC: 9.8 MMOL/L (ref 5–15)
ARTERIAL PATENCY WRIST A: ABNORMAL
AST SERPL-CCNC: 12 U/L (ref 1–32)
BASE EXCESS BLDA CALC-SCNC: 0.6 MMOL/L (ref -2–2)
BASOPHILS # BLD AUTO: 0.03 10*3/MM3 (ref 0–0.2)
BASOPHILS NFR BLD AUTO: 0.3 % (ref 0–1.5)
BDY SITE: ABNORMAL
BILIRUB CONJ SERPL-MCNC: <0.2 MG/DL (ref 0–0.3)
BILIRUB INDIRECT SERPL-MCNC: ABNORMAL MG/DL
BILIRUB SERPL-MCNC: 0.2 MG/DL (ref 0–1.2)
BUN SERPL-MCNC: 8 MG/DL (ref 8–23)
BUN/CREAT SERPL: 8.9 (ref 7–25)
CA-I BLDA-SCNC: 1.06 MMOL/L (ref 1.13–1.32)
CALCIUM SPEC-SCNC: 8.2 MG/DL (ref 8.6–10.5)
CHLORIDE BLDA-SCNC: 107 MMOL/L (ref 98–106)
CHLORIDE SERPL-SCNC: 106 MMOL/L (ref 98–107)
CO2 SERPL-SCNC: 26.2 MMOL/L (ref 22–29)
COHGB MFR BLD: 1.6 % (ref 0–1.5)
CREAT SERPL-MCNC: 0.9 MG/DL (ref 0.57–1)
DEPRECATED RDW RBC AUTO: 43.3 FL (ref 37–54)
EOSINOPHIL # BLD AUTO: 0.16 10*3/MM3 (ref 0–0.4)
EOSINOPHIL NFR BLD AUTO: 1.8 % (ref 0.3–6.2)
ERYTHROCYTE [DISTWIDTH] IN BLOOD BY AUTOMATED COUNT: 14.7 % (ref 12.3–15.4)
FHHB: 0.9 % (ref 0–5)
GFR SERPL CREATININE-BSD FRML MDRD: 62 ML/MIN/1.73
GLUCOSE BLDA-MCNC: 169 MMOL/L (ref 65–99)
GLUCOSE SERPL-MCNC: 137 MG/DL (ref 65–99)
HCO3 BLDA-SCNC: 26 MMOL/L (ref 22–26)
HCT VFR BLD AUTO: 31.3 % (ref 34–46.6)
HGB BLD-MCNC: 10 G/DL (ref 12–15.9)
HGB BLDA-MCNC: 13.3 G/DL (ref 11.7–14.6)
IMM GRANULOCYTES # BLD AUTO: 0.06 10*3/MM3 (ref 0–0.05)
IMM GRANULOCYTES NFR BLD AUTO: 0.7 % (ref 0–0.5)
INHALED O2 CONCENTRATION: 100 %
LACTATE BLDA-SCNC: 1.73 MMOL/L (ref 0.5–2)
LYMPHOCYTES # BLD AUTO: 1.45 10*3/MM3 (ref 0.7–3.1)
LYMPHOCYTES NFR BLD AUTO: 16.5 % (ref 19.6–45.3)
MAGNESIUM SERPL-MCNC: 1.5 MG/DL (ref 1.6–2.4)
MCH RBC QN AUTO: 26.2 PG (ref 26.6–33)
MCHC RBC AUTO-ENTMCNC: 31.9 G/DL (ref 31.5–35.7)
MCV RBC AUTO: 82.2 FL (ref 79–97)
METHGB BLD QL: 0.3 % (ref 0–1.5)
MODALITY: ABNORMAL
MONOCYTES # BLD AUTO: 0.45 10*3/MM3 (ref 0.1–0.9)
MONOCYTES NFR BLD AUTO: 5.1 % (ref 5–12)
NEUTROPHILS NFR BLD AUTO: 6.64 10*3/MM3 (ref 1.7–7)
NEUTROPHILS NFR BLD AUTO: 75.6 % (ref 42.7–76)
NOTE: ABNORMAL
NRBC BLD AUTO-RTO: 0 /100 WBC (ref 0–0.2)
OXYHGB MFR BLDV: 97.2 % (ref 94–99)
PCO2 BLDA: 44.8 MM HG (ref 35–45)
PH BLDA: 7.38 PH UNITS (ref 7.35–7.45)
PHOSPHATE SERPL-MCNC: 3.5 MG/DL (ref 2.5–4.5)
PLATELET # BLD AUTO: 234 10*3/MM3 (ref 140–450)
PMV BLD AUTO: 9.4 FL (ref 6–12)
PO2 BLD: 241 MM[HG] (ref 0–500)
PO2 BLDA: 240.8 MM HG (ref 80–100)
POTASSIUM BLDA-SCNC: 4.25 MMOL/L (ref 3.5–5)
POTASSIUM SERPL-SCNC: 3.6 MMOL/L (ref 3.5–5.2)
PROT SERPL-MCNC: 5.6 G/DL (ref 6–8.5)
RBC # BLD AUTO: 3.81 10*6/MM3 (ref 3.77–5.28)
SAO2 % BLDCOA: 99.1 % (ref 95–99)
SODIUM BLDA-SCNC: 140.5 MMOL/L (ref 136–146)
SODIUM SERPL-SCNC: 142 MMOL/L (ref 136–145)
WBC NRBC COR # BLD: 8.79 10*3/MM3 (ref 3.4–10.8)

## 2022-01-25 PROCEDURE — 25010000002 HEPARIN (PORCINE) PER 1000 UNITS: Performed by: FAMILY MEDICINE

## 2022-01-25 PROCEDURE — 88307 TISSUE EXAM BY PATHOLOGIST: CPT | Performed by: SURGERY

## 2022-01-25 PROCEDURE — 25010000002 VANCOMYCIN 5 G RECONSTITUTED SOLUTION: Performed by: INTERNAL MEDICINE

## 2022-01-25 PROCEDURE — 25010000002 MIDAZOLAM PER 1 MG: Performed by: NURSE ANESTHETIST, CERTIFIED REGISTERED

## 2022-01-25 PROCEDURE — 80048 BASIC METABOLIC PNL TOTAL CA: CPT | Performed by: FAMILY MEDICINE

## 2022-01-25 PROCEDURE — 80076 HEPATIC FUNCTION PANEL: CPT | Performed by: FAMILY MEDICINE

## 2022-01-25 PROCEDURE — 82805 BLOOD GASES W/O2 SATURATION: CPT | Performed by: SURGERY

## 2022-01-25 PROCEDURE — 25010000002 HEPARIN (PORCINE) PER 1000 UNITS: Performed by: SURGERY

## 2022-01-25 PROCEDURE — 25010000002 FENTANYL CITRATE (PF) 50 MCG/ML SOLUTION: Performed by: NURSE ANESTHETIST, CERTIFIED REGISTERED

## 2022-01-25 PROCEDURE — 25010000002 PROPOFOL 10 MG/ML EMULSION: Performed by: NURSE ANESTHETIST, CERTIFIED REGISTERED

## 2022-01-25 PROCEDURE — 0Y6C0Z3 DETACHMENT AT RIGHT UPPER LEG, LOW, OPEN APPROACH: ICD-10-PCS | Performed by: SURGERY

## 2022-01-25 PROCEDURE — 83050 HGB METHEMOGLOBIN QUAN: CPT | Performed by: SURGERY

## 2022-01-25 PROCEDURE — 25010000002 ONDANSETRON PER 1 MG: Performed by: NURSE ANESTHETIST, CERTIFIED REGISTERED

## 2022-01-25 PROCEDURE — 25010000002 PIPERACILLIN SOD-TAZOBACTAM PER 1 G: Performed by: SURGERY

## 2022-01-25 PROCEDURE — 82375 ASSAY CARBOXYHB QUANT: CPT | Performed by: SURGERY

## 2022-01-25 PROCEDURE — 27590 AMPUTATE LEG AT THIGH: CPT | Performed by: SURGERY

## 2022-01-25 PROCEDURE — 0Y6D0Z3 DETACHMENT AT LEFT UPPER LEG, LOW, OPEN APPROACH: ICD-10-PCS | Performed by: SURGERY

## 2022-01-25 PROCEDURE — 25010000002 PIPERACILLIN SOD-TAZOBACTAM PER 1 G: Performed by: FAMILY MEDICINE

## 2022-01-25 PROCEDURE — 88311 DECALCIFY TISSUE: CPT | Performed by: SURGERY

## 2022-01-25 PROCEDURE — 84100 ASSAY OF PHOSPHORUS: CPT | Performed by: FAMILY MEDICINE

## 2022-01-25 PROCEDURE — 85025 COMPLETE CBC W/AUTO DIFF WBC: CPT | Performed by: FAMILY MEDICINE

## 2022-01-25 PROCEDURE — 99233 SBSQ HOSP IP/OBS HIGH 50: CPT | Performed by: FAMILY MEDICINE

## 2022-01-25 PROCEDURE — 83735 ASSAY OF MAGNESIUM: CPT | Performed by: FAMILY MEDICINE

## 2022-01-25 RX ORDER — OXYCODONE HYDROCHLORIDE 5 MG/1
5 TABLET ORAL
Status: DISCONTINUED | OUTPATIENT
Start: 2022-01-25 | End: 2022-01-25 | Stop reason: HOSPADM

## 2022-01-25 RX ORDER — ONDANSETRON 2 MG/ML
INJECTION INTRAMUSCULAR; INTRAVENOUS AS NEEDED
Status: DISCONTINUED | OUTPATIENT
Start: 2022-01-25 | End: 2022-01-25 | Stop reason: SURG

## 2022-01-25 RX ORDER — PROMETHAZINE HYDROCHLORIDE 25 MG/1
25 SUPPOSITORY RECTAL ONCE AS NEEDED
Status: DISCONTINUED | OUTPATIENT
Start: 2022-01-25 | End: 2022-01-25 | Stop reason: HOSPADM

## 2022-01-25 RX ORDER — CEFAZOLIN SODIUM 2 G/100ML
2 INJECTION, SOLUTION INTRAVENOUS ONCE
Status: DISCONTINUED | OUTPATIENT
Start: 2022-01-25 | End: 2022-01-25 | Stop reason: HOSPADM

## 2022-01-25 RX ORDER — MORPHINE SULFATE 2 MG/ML
2 INJECTION, SOLUTION INTRAMUSCULAR; INTRAVENOUS
Status: DISCONTINUED | OUTPATIENT
Start: 2022-01-25 | End: 2022-01-29 | Stop reason: HOSPADM

## 2022-01-25 RX ORDER — METOPROLOL TARTRATE 5 MG/5ML
INJECTION INTRAVENOUS AS NEEDED
Status: DISCONTINUED | OUTPATIENT
Start: 2022-01-25 | End: 2022-01-25 | Stop reason: SURG

## 2022-01-25 RX ORDER — PROMETHAZINE HYDROCHLORIDE 12.5 MG/1
25 TABLET ORAL ONCE AS NEEDED
Status: DISCONTINUED | OUTPATIENT
Start: 2022-01-25 | End: 2022-01-25 | Stop reason: HOSPADM

## 2022-01-25 RX ORDER — DIGOXIN 250 MCG
250 TABLET ORAL ONCE
Status: COMPLETED | OUTPATIENT
Start: 2022-01-25 | End: 2022-01-25

## 2022-01-25 RX ORDER — ONDANSETRON 2 MG/ML
4 INJECTION INTRAMUSCULAR; INTRAVENOUS ONCE AS NEEDED
Status: DISCONTINUED | OUTPATIENT
Start: 2022-01-25 | End: 2022-01-25 | Stop reason: HOSPADM

## 2022-01-25 RX ORDER — MAGNESIUM HYDROXIDE 1200 MG/15ML
LIQUID ORAL AS NEEDED
Status: DISCONTINUED | OUTPATIENT
Start: 2022-01-25 | End: 2022-01-25 | Stop reason: HOSPADM

## 2022-01-25 RX ORDER — NALOXONE HCL 0.4 MG/ML
0.4 VIAL (ML) INJECTION
Status: DISCONTINUED | OUTPATIENT
Start: 2022-01-25 | End: 2022-01-29 | Stop reason: HOSPADM

## 2022-01-25 RX ORDER — PROPOFOL 10 MG/ML
VIAL (ML) INTRAVENOUS AS NEEDED
Status: DISCONTINUED | OUTPATIENT
Start: 2022-01-25 | End: 2022-01-25 | Stop reason: SURG

## 2022-01-25 RX ORDER — DIGOXIN 125 MCG
125 TABLET ORAL
Status: DISCONTINUED | OUTPATIENT
Start: 2022-01-26 | End: 2022-01-25

## 2022-01-25 RX ORDER — LIDOCAINE HYDROCHLORIDE 20 MG/ML
INJECTION, SOLUTION INFILTRATION; PERINEURAL AS NEEDED
Status: DISCONTINUED | OUTPATIENT
Start: 2022-01-25 | End: 2022-01-25 | Stop reason: SURG

## 2022-01-25 RX ORDER — PHENYLEPHRINE HCL IN 0.9% NACL 1 MG/10 ML
SYRINGE (ML) INTRAVENOUS AS NEEDED
Status: DISCONTINUED | OUTPATIENT
Start: 2022-01-25 | End: 2022-01-25 | Stop reason: SURG

## 2022-01-25 RX ORDER — ROCURONIUM BROMIDE 10 MG/ML
INJECTION, SOLUTION INTRAVENOUS AS NEEDED
Status: DISCONTINUED | OUTPATIENT
Start: 2022-01-25 | End: 2022-01-25 | Stop reason: SURG

## 2022-01-25 RX ORDER — MIDAZOLAM HYDROCHLORIDE 1 MG/ML
INJECTION INTRAMUSCULAR; INTRAVENOUS AS NEEDED
Status: DISCONTINUED | OUTPATIENT
Start: 2022-01-25 | End: 2022-01-25 | Stop reason: SURG

## 2022-01-25 RX ORDER — SODIUM CHLORIDE, SODIUM LACTATE, POTASSIUM CHLORIDE, CALCIUM CHLORIDE 600; 310; 30; 20 MG/100ML; MG/100ML; MG/100ML; MG/100ML
INJECTION, SOLUTION INTRAVENOUS CONTINUOUS PRN
Status: DISCONTINUED | OUTPATIENT
Start: 2022-01-25 | End: 2022-01-25 | Stop reason: SURG

## 2022-01-25 RX ORDER — OXYCODONE AND ACETAMINOPHEN 10; 325 MG/1; MG/1
1 TABLET ORAL EVERY 6 HOURS PRN
Status: DISCONTINUED | OUTPATIENT
Start: 2022-01-25 | End: 2022-01-29 | Stop reason: HOSPADM

## 2022-01-25 RX ORDER — MEPERIDINE HYDROCHLORIDE 25 MG/ML
12.5 INJECTION INTRAMUSCULAR; INTRAVENOUS; SUBCUTANEOUS
Status: DISCONTINUED | OUTPATIENT
Start: 2022-01-25 | End: 2022-01-25 | Stop reason: HOSPADM

## 2022-01-25 RX ORDER — FENTANYL CITRATE 50 UG/ML
INJECTION, SOLUTION INTRAMUSCULAR; INTRAVENOUS AS NEEDED
Status: DISCONTINUED | OUTPATIENT
Start: 2022-01-25 | End: 2022-01-25 | Stop reason: SURG

## 2022-01-25 RX ORDER — OXYCODONE HYDROCHLORIDE AND ACETAMINOPHEN 5; 325 MG/1; MG/1
1 TABLET ORAL EVERY 6 HOURS PRN
Status: DISCONTINUED | OUTPATIENT
Start: 2022-01-25 | End: 2022-01-29 | Stop reason: HOSPADM

## 2022-01-25 RX ORDER — DIGOXIN 125 MCG
125 TABLET ORAL
Status: DISCONTINUED | OUTPATIENT
Start: 2022-01-26 | End: 2022-01-29 | Stop reason: HOSPADM

## 2022-01-25 RX ADMIN — FENTANYL CITRATE 50 MCG: 50 INJECTION, SOLUTION INTRAMUSCULAR; INTRAVENOUS at 11:33

## 2022-01-25 RX ADMIN — SUGAMMADEX 200 MG: 100 INJECTION, SOLUTION INTRAVENOUS at 12:58

## 2022-01-25 RX ADMIN — VANCOMYCIN HYDROCHLORIDE 1250 MG: 5 INJECTION, POWDER, LYOPHILIZED, FOR SOLUTION INTRAVENOUS at 22:28

## 2022-01-25 RX ADMIN — METOPROLOL TARTRATE 1 MG: 5 INJECTION INTRAVENOUS at 12:10

## 2022-01-25 RX ADMIN — Medication 15 MG/HR: at 10:44

## 2022-01-25 RX ADMIN — METOPROLOL TARTRATE 1 MG: 5 INJECTION INTRAVENOUS at 12:06

## 2022-01-25 RX ADMIN — SODIUM CHLORIDE, POTASSIUM CHLORIDE, SODIUM LACTATE AND CALCIUM CHLORIDE 125 ML/HR: 600; 310; 30; 20 INJECTION, SOLUTION INTRAVENOUS at 15:43

## 2022-01-25 RX ADMIN — TAZOBACTAM SODIUM AND PIPERACILLIN SODIUM 3.38 G: 375; 3 INJECTION, SOLUTION INTRAVENOUS at 17:36

## 2022-01-25 RX ADMIN — ROCURONIUM BROMIDE 50 MG: 10 INJECTION INTRAVENOUS at 10:38

## 2022-01-25 RX ADMIN — LIDOCAINE HYDROCHLORIDE 100 MG: 20 INJECTION, SOLUTION INFILTRATION; PERINEURAL at 10:36

## 2022-01-25 RX ADMIN — FENTANYL CITRATE 50 MCG: 50 INJECTION, SOLUTION INTRAMUSCULAR; INTRAVENOUS at 11:03

## 2022-01-25 RX ADMIN — PROPOFOL 100 MG: 10 INJECTION, EMULSION INTRAVENOUS at 10:37

## 2022-01-25 RX ADMIN — LEVETIRACETAM 500 MG: 500 TABLET, FILM COATED ORAL at 20:52

## 2022-01-25 RX ADMIN — FENTANYL CITRATE 50 MCG: 50 INJECTION, SOLUTION INTRAMUSCULAR; INTRAVENOUS at 12:06

## 2022-01-25 RX ADMIN — FENTANYL CITRATE 50 MCG: 50 INJECTION, SOLUTION INTRAMUSCULAR; INTRAVENOUS at 10:36

## 2022-01-25 RX ADMIN — PROPOFOL 40 MG: 10 INJECTION, EMULSION INTRAVENOUS at 12:10

## 2022-01-25 RX ADMIN — ONDANSETRON 4 MG: 2 INJECTION INTRAMUSCULAR; INTRAVENOUS at 12:44

## 2022-01-25 RX ADMIN — ATORVASTATIN CALCIUM 10 MG: 10 TABLET, FILM COATED ORAL at 20:52

## 2022-01-25 RX ADMIN — SODIUM CHLORIDE, POTASSIUM CHLORIDE, SODIUM LACTATE AND CALCIUM CHLORIDE: 600; 310; 30; 20 INJECTION, SOLUTION INTRAVENOUS at 10:27

## 2022-01-25 RX ADMIN — SODIUM CHLORIDE, POTASSIUM CHLORIDE, SODIUM LACTATE AND CALCIUM CHLORIDE 125 ML/HR: 600; 310; 30; 20 INJECTION, SOLUTION INTRAVENOUS at 01:51

## 2022-01-25 RX ADMIN — Medication 200 MCG: at 12:24

## 2022-01-25 RX ADMIN — BUSPIRONE HYDROCHLORIDE 15 MG: 15 TABLET ORAL at 20:53

## 2022-01-25 RX ADMIN — METOPROLOL TARTRATE 75 MG: 25 TABLET, FILM COATED ORAL at 20:53

## 2022-01-25 RX ADMIN — SODIUM CHLORIDE, POTASSIUM CHLORIDE, SODIUM LACTATE AND CALCIUM CHLORIDE: 600; 310; 30; 20 INJECTION, SOLUTION INTRAVENOUS at 11:00

## 2022-01-25 RX ADMIN — HEPARIN SODIUM 5000 UNITS: 5000 INJECTION, SOLUTION INTRAVENOUS; SUBCUTANEOUS at 05:45

## 2022-01-25 RX ADMIN — OXYCODONE HYDROCHLORIDE AND ACETAMINOPHEN 1 TABLET: 5; 325 TABLET ORAL at 22:27

## 2022-01-25 RX ADMIN — Medication 15 MG/HR: at 01:43

## 2022-01-25 RX ADMIN — Medication 15 MG/HR: at 20:21

## 2022-01-25 RX ADMIN — Medication 200 MCG: at 10:45

## 2022-01-25 RX ADMIN — MIDAZOLAM HYDROCHLORIDE 2 MG: 1 INJECTION, SOLUTION INTRAMUSCULAR; INTRAVENOUS at 10:36

## 2022-01-25 RX ADMIN — DIGOXIN 250 MCG: 250 TABLET ORAL at 20:52

## 2022-01-25 RX ADMIN — PROPOFOL 50 MG: 10 INJECTION, EMULSION INTRAVENOUS at 11:33

## 2022-01-25 RX ADMIN — QUETIAPINE FUMARATE 100 MG: 200 TABLET ORAL at 20:53

## 2022-01-25 RX ADMIN — HEPARIN SODIUM 5000 UNITS: 5000 INJECTION, SOLUTION INTRAVENOUS; SUBCUTANEOUS at 22:27

## 2022-01-25 RX ADMIN — TAZOBACTAM SODIUM AND PIPERACILLIN SODIUM 3.38 G: 375; 3 INJECTION, SOLUTION INTRAVENOUS at 07:39

## 2022-01-25 NOTE — ANESTHESIA PROCEDURE NOTES
Arterial Line      Patient reassessed immediately prior to procedure    Patient location during procedure: OR  Start time: 1/25/2022 10:40 AM  Stop Time:1/25/2022 10:47 AM       Line placed for hemodynamic monitoring.  Performed By   CRNA: Kaiden Louis CRNA  Preanesthetic Checklist  Completed: patient identified, IV checked, site marked, risks and benefits discussed, surgical consent, monitors and equipment checked, pre-op evaluation and timeout performed  Arterial Line Prep   Sterile Tech: cap, gloves, mask, sterile barriers and gown  Prep: ChloraPrep  Patient monitoring: blood pressure monitoring, continuous pulse oximetry and EKG  Arterial Line Procedure   Laterality:right  Catheter size: 20 G   Guidance: landmark technique and palpation technique  Number of attempts: 1  Successful placement: yes  Post Assessment   Dressing Type: occlusive dressing applied, secured with tape and wrist guard applied.   Complications no  Circ/Move/Sens Assessment: normal.   Patient Tolerance: patient tolerated the procedure well with no apparent complications  Additional Notes  Wire intact. Calibrated/Connections checked, line flushed.  Appropriate waveform. Clear occlusive opsite applied over catheter insertion site. Pt remained hemodynamically stable throughout procedure.

## 2022-01-25 NOTE — ANESTHESIA POSTPROCEDURE EVALUATION
Patient: Chantal Kumar    Procedure Summary     Date: 01/25/22 Room / Location: [facility] OR 01 / [facility] MAIN OR    Anesthesia Start: 1027 Anesthesia Stop: 1306    Procedure: Bilateral above the knee amputation (Bilateral Thigh) Diagnosis:       Multiple open wounds of foot      (Multiple open wounds of foot [S91.309A])    Surgeons: Herber Rodriguez MD Provider: Renate Ramírez DO    Anesthesia Type: general ASA Status: 4          Anesthesia Type: general    Vitals  No vitals data found for the desired time range.          Post Anesthesia Care and Evaluation    Patient location during evaluation: bedside  Patient participation: complete - patient participated  Level of consciousness: awake  Pain management: adequate  Airway patency: patent  Anesthetic complications: No anesthetic complications  PONV Status: none  Cardiovascular status: acceptable and stable  Respiratory status: acceptable  Hydration status: acceptable    Comments: An Anesthesiologist personally participated in the most demanding procedures (including induction and emergence if applicable) in the anesthesia plan, monitored the course of anesthesia administration at frequent intervals and remained physically present and available for immediate diagnosis and treatment of emergencies.

## 2022-01-25 NOTE — ANESTHESIA PREPROCEDURE EVALUATION
Anesthesia Evaluation     Patient summary reviewed and Nursing notes reviewed   no history of anesthetic complications:  NPO Solid Status: > 8 hours  NPO Liquid Status: > 2 hours           Airway   Mallampati: IV  TM distance: <3 FB  Difficult intubation highly probable, Small opening and Large neck circumference  Dental    (+) edentulous    Pulmonary - negative pulmonary ROS and normal exam   Cardiovascular     ECG reviewed    (+) hypertension, dysrhythmias Atrial Fib, angina (none currently), CHF , DVT, hyperlipidemia,     ROS comment: 1/22/22 nuclear stress test:  Interpretation Summary    · Left ventricular ejection fraction is hyperdynamic (Calculated EF > 70%). .  · No evidence of ischemia in this study  · Impressions are consistent with a low risk study.    1/20/22 echo:  Interpretation Summary    · Estimated right ventricular systolic pressure from tricuspid regurgitation is normal (<35 mmHg). Calculated right ventricular systolic pressure from tricuspid regurgitation is 20 mmHg.  · Left ventricular wall thickness is consistent with mild concentric hypertrophy.  · Left ventricular ejection fraction appears to be 51 - 55%. Left ventricular systolic function is low normal.  · Left ventricular diastolic function was indeterminate.  · Technically difficult study due to patient positioning, limited acoustic windows      PE comment: On cardizem drip    Neuro/Psych  (+) seizures (last seizure a week ago, on meds) poorly controlled, CVA (left sided contracture) residual symptoms, headaches, numbness, psychiatric history Depression and Bipolar,     GI/Hepatic/Renal/Endo    (+) obesity, morbid obesity,  diabetes mellitus type 2 poorly controlled,     Musculoskeletal     (+) chronic pain,   Abdominal   (+) obese,    Substance History   (+) drug use (fentanyl patch 50 mcg/h, replaced every 72 hrs)     OB/GYN negative ob/gyn ROS         Other   arthritis,      ROS/Med Hx Other: Initially admitted for altered mental  status and found to be septic secondary to lower extremity wounds. Also c/o chest pain and was in a fib with RVR requiring cardizem drip. Nuclear study showed no evidence of ischemia and hyperdynamic low ventricular systolic function. Cardiology recommends patient's HR be controlled with beta blockers and IV cardizem.                Anesthesia Plan    ASA 4     general   Rapid sequence(Patient understands and agrees to arterial line placement and possibility of post op ventilation.)  intravenous induction     Anesthetic plan, all risks, benefits, and alternatives have been provided, discussed and informed consent has been obtained with: patient.  Use of blood products discussed with patient  Consented to blood products.   Plan discussed with CRNA.        CODE STATUS:    Code Status (Patient has no pulse and is not breathing): CPR (Attempt to Resuscitate)  Medical Interventions (Patient has pulse or is breathing): Full Support

## 2022-01-26 LAB
ALBUMIN SERPL-MCNC: 2.2 G/DL (ref 3.5–5.2)
ALP SERPL-CCNC: 79 U/L (ref 39–117)
ALT SERPL W P-5'-P-CCNC: 9 U/L (ref 1–33)
ANION GAP SERPL CALCULATED.3IONS-SCNC: 9.5 MMOL/L (ref 5–15)
AST SERPL-CCNC: 14 U/L (ref 1–32)
BASOPHILS # BLD AUTO: 0.07 10*3/MM3 (ref 0–0.2)
BASOPHILS NFR BLD AUTO: 0.5 % (ref 0–1.5)
BILIRUB CONJ SERPL-MCNC: <0.2 MG/DL (ref 0–0.3)
BILIRUB INDIRECT SERPL-MCNC: ABNORMAL MG/DL
BILIRUB SERPL-MCNC: 0.3 MG/DL (ref 0–1.2)
BUN SERPL-MCNC: 7 MG/DL (ref 8–23)
BUN/CREAT SERPL: 8 (ref 7–25)
CALCIUM SPEC-SCNC: 7.9 MG/DL (ref 8.6–10.5)
CHLORIDE SERPL-SCNC: 103 MMOL/L (ref 98–107)
CO2 SERPL-SCNC: 26.5 MMOL/L (ref 22–29)
CREAT SERPL-MCNC: 0.87 MG/DL (ref 0.57–1)
DEPRECATED RDW RBC AUTO: 43.9 FL (ref 37–54)
EOSINOPHIL # BLD AUTO: 0.03 10*3/MM3 (ref 0–0.4)
EOSINOPHIL NFR BLD AUTO: 0.2 % (ref 0.3–6.2)
ERYTHROCYTE [DISTWIDTH] IN BLOOD BY AUTOMATED COUNT: 15 % (ref 12.3–15.4)
GFR SERPL CREATININE-BSD FRML MDRD: 64 ML/MIN/1.73
GLUCOSE SERPL-MCNC: 171 MG/DL (ref 65–99)
HCT VFR BLD AUTO: 26.8 % (ref 34–46.6)
HGB BLD-MCNC: 8.4 G/DL (ref 12–15.9)
IMM GRANULOCYTES # BLD AUTO: 0.05 10*3/MM3 (ref 0–0.05)
IMM GRANULOCYTES NFR BLD AUTO: 0.4 % (ref 0–0.5)
LYMPHOCYTES # BLD AUTO: 1.71 10*3/MM3 (ref 0.7–3.1)
LYMPHOCYTES NFR BLD AUTO: 13 % (ref 19.6–45.3)
MAGNESIUM SERPL-MCNC: 1.5 MG/DL (ref 1.6–2.4)
MCH RBC QN AUTO: 26 PG (ref 26.6–33)
MCHC RBC AUTO-ENTMCNC: 31.3 G/DL (ref 31.5–35.7)
MCV RBC AUTO: 83 FL (ref 79–97)
MONOCYTES # BLD AUTO: 0.92 10*3/MM3 (ref 0.1–0.9)
MONOCYTES NFR BLD AUTO: 7 % (ref 5–12)
NEUTROPHILS NFR BLD AUTO: 10.4 10*3/MM3 (ref 1.7–7)
NEUTROPHILS NFR BLD AUTO: 78.9 % (ref 42.7–76)
NRBC BLD AUTO-RTO: 0 /100 WBC (ref 0–0.2)
PHOSPHATE SERPL-MCNC: 3.5 MG/DL (ref 2.5–4.5)
PLATELET # BLD AUTO: 255 10*3/MM3 (ref 140–450)
PMV BLD AUTO: 9.6 FL (ref 6–12)
POTASSIUM SERPL-SCNC: 3.6 MMOL/L (ref 3.5–5.2)
PROT SERPL-MCNC: 5.4 G/DL (ref 6–8.5)
RBC # BLD AUTO: 3.23 10*6/MM3 (ref 3.77–5.28)
SODIUM SERPL-SCNC: 139 MMOL/L (ref 136–145)
VANCOMYCIN SERPL-MCNC: 20.05 MCG/ML (ref 5–40)
WBC NRBC COR # BLD: 13.18 10*3/MM3 (ref 3.4–10.8)

## 2022-01-26 PROCEDURE — 84100 ASSAY OF PHOSPHORUS: CPT | Performed by: SURGERY

## 2022-01-26 PROCEDURE — 99024 POSTOP FOLLOW-UP VISIT: CPT | Performed by: SURGERY

## 2022-01-26 PROCEDURE — 25010000002 HEPARIN (PORCINE) PER 1000 UNITS: Performed by: SURGERY

## 2022-01-26 PROCEDURE — 80048 BASIC METABOLIC PNL TOTAL CA: CPT | Performed by: SURGERY

## 2022-01-26 PROCEDURE — 25010000002 PIPERACILLIN SOD-TAZOBACTAM PER 1 G: Performed by: SURGERY

## 2022-01-26 PROCEDURE — 99233 SBSQ HOSP IP/OBS HIGH 50: CPT | Performed by: FAMILY MEDICINE

## 2022-01-26 PROCEDURE — 97161 PT EVAL LOW COMPLEX 20 MIN: CPT

## 2022-01-26 PROCEDURE — 25010000002 MORPHINE PER 10 MG: Performed by: SURGERY

## 2022-01-26 PROCEDURE — 80202 ASSAY OF VANCOMYCIN: CPT | Performed by: INTERNAL MEDICINE

## 2022-01-26 PROCEDURE — 83735 ASSAY OF MAGNESIUM: CPT | Performed by: SURGERY

## 2022-01-26 PROCEDURE — 85025 COMPLETE CBC W/AUTO DIFF WBC: CPT | Performed by: SURGERY

## 2022-01-26 PROCEDURE — 80076 HEPATIC FUNCTION PANEL: CPT | Performed by: SURGERY

## 2022-01-26 PROCEDURE — 25010000002 VANCOMYCIN 5 G RECONSTITUTED SOLUTION: Performed by: INTERNAL MEDICINE

## 2022-01-26 PROCEDURE — 97165 OT EVAL LOW COMPLEX 30 MIN: CPT

## 2022-01-26 RX ORDER — DIGOXIN 250 MCG
250 TABLET ORAL
Status: COMPLETED | OUTPATIENT
Start: 2022-01-26 | End: 2022-01-26

## 2022-01-26 RX ADMIN — Medication 5 MG/HR: at 12:00

## 2022-01-26 RX ADMIN — SODIUM CHLORIDE, PRESERVATIVE FREE 10 ML: 5 INJECTION INTRAVENOUS at 21:41

## 2022-01-26 RX ADMIN — MORPHINE SULFATE 2 MG: 2 INJECTION, SOLUTION INTRAMUSCULAR; INTRAVENOUS at 22:52

## 2022-01-26 RX ADMIN — HEPARIN SODIUM 5000 UNITS: 5000 INJECTION, SOLUTION INTRAVENOUS; SUBCUTANEOUS at 21:39

## 2022-01-26 RX ADMIN — HEPARIN SODIUM 5000 UNITS: 5000 INJECTION, SOLUTION INTRAVENOUS; SUBCUTANEOUS at 14:08

## 2022-01-26 RX ADMIN — OXYCODONE HYDROCHLORIDE AND ACETAMINOPHEN 1 TABLET: 10; 325 TABLET ORAL at 05:14

## 2022-01-26 RX ADMIN — DIGOXIN 250 MCG: 250 TABLET ORAL at 17:03

## 2022-01-26 RX ADMIN — TAZOBACTAM SODIUM AND PIPERACILLIN SODIUM 3.38 G: 375; 3 INJECTION, SOLUTION INTRAVENOUS at 01:27

## 2022-01-26 RX ADMIN — BUSPIRONE HYDROCHLORIDE 15 MG: 15 TABLET ORAL at 09:45

## 2022-01-26 RX ADMIN — QUETIAPINE FUMARATE 100 MG: 200 TABLET ORAL at 21:40

## 2022-01-26 RX ADMIN — BUSPIRONE HYDROCHLORIDE 15 MG: 15 TABLET ORAL at 17:02

## 2022-01-26 RX ADMIN — ASPIRIN 81 MG: 81 TABLET, COATED ORAL at 09:45

## 2022-01-26 RX ADMIN — LEVETIRACETAM 500 MG: 500 TABLET, FILM COATED ORAL at 21:40

## 2022-01-26 RX ADMIN — TAZOBACTAM SODIUM AND PIPERACILLIN SODIUM 3.38 G: 375; 3 INJECTION, SOLUTION INTRAVENOUS at 12:00

## 2022-01-26 RX ADMIN — SENNOSIDES AND DOCUSATE SODIUM 2 TABLET: 50; 8.6 TABLET ORAL at 09:45

## 2022-01-26 RX ADMIN — ATORVASTATIN CALCIUM 10 MG: 10 TABLET, FILM COATED ORAL at 21:40

## 2022-01-26 RX ADMIN — BUPROPION HYDROCHLORIDE 150 MG: 150 TABLET, EXTENDED RELEASE ORAL at 09:46

## 2022-01-26 RX ADMIN — METOPROLOL TARTRATE 100 MG: 25 TABLET, FILM COATED ORAL at 21:40

## 2022-01-26 RX ADMIN — HEPARIN SODIUM 5000 UNITS: 5000 INJECTION, SOLUTION INTRAVENOUS; SUBCUTANEOUS at 05:13

## 2022-01-26 RX ADMIN — TAZOBACTAM SODIUM AND PIPERACILLIN SODIUM 3.38 G: 375; 3 INJECTION, SOLUTION INTRAVENOUS at 17:02

## 2022-01-26 RX ADMIN — SENNOSIDES AND DOCUSATE SODIUM 2 TABLET: 50; 8.6 TABLET ORAL at 21:41

## 2022-01-26 RX ADMIN — BUSPIRONE HYDROCHLORIDE 15 MG: 15 TABLET ORAL at 21:40

## 2022-01-26 RX ADMIN — VANCOMYCIN HYDROCHLORIDE 1250 MG: 5 INJECTION, POWDER, LYOPHILIZED, FOR SOLUTION INTRAVENOUS at 21:41

## 2022-01-26 RX ADMIN — MORPHINE SULFATE 2 MG: 2 INJECTION, SOLUTION INTRAMUSCULAR; INTRAVENOUS at 12:01

## 2022-01-26 RX ADMIN — DIGOXIN 125 MCG: 125 TABLET ORAL at 14:08

## 2022-01-26 RX ADMIN — METOPROLOL TARTRATE 75 MG: 25 TABLET, FILM COATED ORAL at 09:46

## 2022-01-26 RX ADMIN — LEVETIRACETAM 500 MG: 500 TABLET, FILM COATED ORAL at 09:45

## 2022-01-26 RX ADMIN — OXYCODONE HYDROCHLORIDE AND ACETAMINOPHEN 1 TABLET: 10; 325 TABLET ORAL at 17:02

## 2022-01-27 LAB
ANION GAP SERPL CALCULATED.3IONS-SCNC: 6.6 MMOL/L (ref 5–15)
BASOPHILS # BLD AUTO: 0.05 10*3/MM3 (ref 0–0.2)
BASOPHILS NFR BLD AUTO: 0.5 % (ref 0–1.5)
BUN SERPL-MCNC: 11 MG/DL (ref 8–23)
BUN/CREAT SERPL: 12.6 (ref 7–25)
CALCIUM SPEC-SCNC: 7.8 MG/DL (ref 8.6–10.5)
CHLORIDE SERPL-SCNC: 105 MMOL/L (ref 98–107)
CO2 SERPL-SCNC: 31.4 MMOL/L (ref 22–29)
CREAT SERPL-MCNC: 0.87 MG/DL (ref 0.57–1)
CYTO UR: NORMAL
DEPRECATED RDW RBC AUTO: 47.1 FL (ref 37–54)
DIGOXIN SERPL-MCNC: 0.33 NG/ML (ref 0.6–1.2)
EOSINOPHIL # BLD AUTO: 0.08 10*3/MM3 (ref 0–0.4)
EOSINOPHIL NFR BLD AUTO: 0.8 % (ref 0.3–6.2)
ERYTHROCYTE [DISTWIDTH] IN BLOOD BY AUTOMATED COUNT: 15.3 % (ref 12.3–15.4)
GFR SERPL CREATININE-BSD FRML MDRD: 64 ML/MIN/1.73
GLUCOSE SERPL-MCNC: 183 MG/DL (ref 65–99)
HCT VFR BLD AUTO: 24.6 % (ref 34–46.6)
HGB BLD-MCNC: 7.5 G/DL (ref 12–15.9)
IMM GRANULOCYTES # BLD AUTO: 0.04 10*3/MM3 (ref 0–0.05)
IMM GRANULOCYTES NFR BLD AUTO: 0.4 % (ref 0–0.5)
LAB AP CASE REPORT: NORMAL
LAB AP CLINICAL INFORMATION: NORMAL
LYMPHOCYTES # BLD AUTO: 1.57 10*3/MM3 (ref 0.7–3.1)
LYMPHOCYTES NFR BLD AUTO: 15.4 % (ref 19.6–45.3)
MCH RBC QN AUTO: 26.4 PG (ref 26.6–33)
MCHC RBC AUTO-ENTMCNC: 30.5 G/DL (ref 31.5–35.7)
MCV RBC AUTO: 86.6 FL (ref 79–97)
MONOCYTES # BLD AUTO: 0.69 10*3/MM3 (ref 0.1–0.9)
MONOCYTES NFR BLD AUTO: 6.8 % (ref 5–12)
NEUTROPHILS NFR BLD AUTO: 7.77 10*3/MM3 (ref 1.7–7)
NEUTROPHILS NFR BLD AUTO: 76.1 % (ref 42.7–76)
NRBC BLD AUTO-RTO: 0 /100 WBC (ref 0–0.2)
PATH REPORT.FINAL DX SPEC: NORMAL
PATH REPORT.GROSS SPEC: NORMAL
PLATELET # BLD AUTO: 186 10*3/MM3 (ref 140–450)
PMV BLD AUTO: 10.4 FL (ref 6–12)
POTASSIUM SERPL-SCNC: 3.6 MMOL/L (ref 3.5–5.2)
RBC # BLD AUTO: 2.84 10*6/MM3 (ref 3.77–5.28)
SODIUM SERPL-SCNC: 143 MMOL/L (ref 136–145)
WBC NRBC COR # BLD: 10.2 10*3/MM3 (ref 3.4–10.8)

## 2022-01-27 PROCEDURE — 99233 SBSQ HOSP IP/OBS HIGH 50: CPT | Performed by: FAMILY MEDICINE

## 2022-01-27 PROCEDURE — 25010000002 PIPERACILLIN SOD-TAZOBACTAM PER 1 G: Performed by: SURGERY

## 2022-01-27 PROCEDURE — 25010000002 MORPHINE PER 10 MG: Performed by: SURGERY

## 2022-01-27 PROCEDURE — 80162 ASSAY OF DIGOXIN TOTAL: CPT | Performed by: SPECIALIST

## 2022-01-27 PROCEDURE — 80048 BASIC METABOLIC PNL TOTAL CA: CPT | Performed by: INTERNAL MEDICINE

## 2022-01-27 PROCEDURE — 25010000002 DIGOXIN PER 500 MCG: Performed by: SPECIALIST

## 2022-01-27 PROCEDURE — 25010000002 HEPARIN (PORCINE) PER 1000 UNITS: Performed by: SURGERY

## 2022-01-27 PROCEDURE — 85025 COMPLETE CBC W/AUTO DIFF WBC: CPT | Performed by: FAMILY MEDICINE

## 2022-01-27 RX ORDER — DIGOXIN 0.25 MG/ML
250 INJECTION INTRAMUSCULAR; INTRAVENOUS ONCE
Status: COMPLETED | OUTPATIENT
Start: 2022-01-27 | End: 2022-01-27

## 2022-01-27 RX ADMIN — TAZOBACTAM SODIUM AND PIPERACILLIN SODIUM 3.38 G: 375; 3 INJECTION, SOLUTION INTRAVENOUS at 16:29

## 2022-01-27 RX ADMIN — QUETIAPINE FUMARATE 100 MG: 200 TABLET ORAL at 20:36

## 2022-01-27 RX ADMIN — FENTANYL TRANSDERMAL 1 PATCH: 50 PATCH, EXTENDED RELEASE TRANSDERMAL at 19:55

## 2022-01-27 RX ADMIN — OXYCODONE HYDROCHLORIDE AND ACETAMINOPHEN 1 TABLET: 10; 325 TABLET ORAL at 03:56

## 2022-01-27 RX ADMIN — HEPARIN SODIUM 5000 UNITS: 5000 INJECTION, SOLUTION INTRAVENOUS; SUBCUTANEOUS at 16:29

## 2022-01-27 RX ADMIN — BUSPIRONE HYDROCHLORIDE 15 MG: 15 TABLET ORAL at 09:14

## 2022-01-27 RX ADMIN — BUSPIRONE HYDROCHLORIDE 15 MG: 15 TABLET ORAL at 16:30

## 2022-01-27 RX ADMIN — OXYCODONE HYDROCHLORIDE AND ACETAMINOPHEN 1 TABLET: 10; 325 TABLET ORAL at 18:37

## 2022-01-27 RX ADMIN — OXYCODONE HYDROCHLORIDE AND ACETAMINOPHEN 1 TABLET: 10; 325 TABLET ORAL at 10:16

## 2022-01-27 RX ADMIN — SODIUM CHLORIDE, POTASSIUM CHLORIDE, SODIUM LACTATE AND CALCIUM CHLORIDE 125 ML/HR: 600; 310; 30; 20 INJECTION, SOLUTION INTRAVENOUS at 16:29

## 2022-01-27 RX ADMIN — BUSPIRONE HYDROCHLORIDE 15 MG: 15 TABLET ORAL at 20:36

## 2022-01-27 RX ADMIN — HEPARIN SODIUM 5000 UNITS: 5000 INJECTION, SOLUTION INTRAVENOUS; SUBCUTANEOUS at 21:56

## 2022-01-27 RX ADMIN — ATORVASTATIN CALCIUM 10 MG: 10 TABLET, FILM COATED ORAL at 20:36

## 2022-01-27 RX ADMIN — SODIUM CHLORIDE, PRESERVATIVE FREE 10 ML: 5 INJECTION INTRAVENOUS at 17:18

## 2022-01-27 RX ADMIN — LEVETIRACETAM 500 MG: 500 TABLET, FILM COATED ORAL at 20:36

## 2022-01-27 RX ADMIN — MORPHINE SULFATE 2 MG: 2 INJECTION, SOLUTION INTRAMUSCULAR; INTRAVENOUS at 17:18

## 2022-01-27 RX ADMIN — TAZOBACTAM SODIUM AND PIPERACILLIN SODIUM 3.38 G: 375; 3 INJECTION, SOLUTION INTRAVENOUS at 09:14

## 2022-01-27 RX ADMIN — HEPARIN SODIUM 5000 UNITS: 5000 INJECTION, SOLUTION INTRAVENOUS; SUBCUTANEOUS at 05:51

## 2022-01-27 RX ADMIN — DIGOXIN 125 MCG: 125 TABLET ORAL at 11:19

## 2022-01-27 RX ADMIN — BUPROPION HYDROCHLORIDE 150 MG: 150 TABLET, EXTENDED RELEASE ORAL at 09:13

## 2022-01-27 RX ADMIN — SENNOSIDES AND DOCUSATE SODIUM 2 TABLET: 50; 8.6 TABLET ORAL at 09:14

## 2022-01-27 RX ADMIN — SODIUM CHLORIDE, PRESERVATIVE FREE 10 ML: 5 INJECTION INTRAVENOUS at 18:29

## 2022-01-27 RX ADMIN — ASPIRIN 81 MG: 81 TABLET, COATED ORAL at 09:14

## 2022-01-27 RX ADMIN — DIGOXIN 250 MCG: 0.25 INJECTION INTRAMUSCULAR; INTRAVENOUS at 18:29

## 2022-01-27 RX ADMIN — TAZOBACTAM SODIUM AND PIPERACILLIN SODIUM 3.38 G: 375; 3 INJECTION, SOLUTION INTRAVENOUS at 00:05

## 2022-01-27 RX ADMIN — SODIUM CHLORIDE, POTASSIUM CHLORIDE, SODIUM LACTATE AND CALCIUM CHLORIDE 125 ML/HR: 600; 310; 30; 20 INJECTION, SOLUTION INTRAVENOUS at 05:51

## 2022-01-27 RX ADMIN — LEVETIRACETAM 500 MG: 500 TABLET, FILM COATED ORAL at 09:14

## 2022-01-27 RX ADMIN — METOPROLOL TARTRATE 100 MG: 25 TABLET, FILM COATED ORAL at 20:36

## 2022-01-27 RX ADMIN — METOPROLOL TARTRATE 100 MG: 25 TABLET, FILM COATED ORAL at 09:14

## 2022-01-28 LAB
ABO GROUP BLD: NORMAL
ALBUMIN SERPL-MCNC: 2.2 G/DL (ref 3.5–5.2)
ALP SERPL-CCNC: 79 U/L (ref 39–117)
ALT SERPL W P-5'-P-CCNC: 8 U/L (ref 1–33)
ANION GAP SERPL CALCULATED.3IONS-SCNC: 7.9 MMOL/L (ref 5–15)
AST SERPL-CCNC: 11 U/L (ref 1–32)
BASOPHILS # BLD AUTO: 0.04 10*3/MM3 (ref 0–0.2)
BASOPHILS NFR BLD AUTO: 0.4 % (ref 0–1.5)
BILIRUB CONJ SERPL-MCNC: <0.2 MG/DL (ref 0–0.3)
BILIRUB INDIRECT SERPL-MCNC: ABNORMAL MG/DL
BILIRUB SERPL-MCNC: 0.2 MG/DL (ref 0–1.2)
BLD GP AB SCN SERPL QL: NEGATIVE
BUN SERPL-MCNC: 10 MG/DL (ref 8–23)
BUN/CREAT SERPL: 11.8 (ref 7–25)
CALCIUM SPEC-SCNC: 7.9 MG/DL (ref 8.6–10.5)
CHLORIDE SERPL-SCNC: 104 MMOL/L (ref 98–107)
CO2 SERPL-SCNC: 30.1 MMOL/L (ref 22–29)
CREAT SERPL-MCNC: 0.85 MG/DL (ref 0.57–1)
DEPRECATED RDW RBC AUTO: 45.8 FL (ref 37–54)
EOSINOPHIL # BLD AUTO: 0.2 10*3/MM3 (ref 0–0.4)
EOSINOPHIL NFR BLD AUTO: 1.8 % (ref 0.3–6.2)
ERYTHROCYTE [DISTWIDTH] IN BLOOD BY AUTOMATED COUNT: 15.3 % (ref 12.3–15.4)
GFR SERPL CREATININE-BSD FRML MDRD: 66 ML/MIN/1.73
GLUCOSE SERPL-MCNC: 163 MG/DL (ref 65–99)
HCT VFR BLD AUTO: 26.9 % (ref 34–46.6)
HGB BLD-MCNC: 8.1 G/DL (ref 12–15.9)
IMM GRANULOCYTES # BLD AUTO: 0.04 10*3/MM3 (ref 0–0.05)
IMM GRANULOCYTES NFR BLD AUTO: 0.4 % (ref 0–0.5)
LYMPHOCYTES # BLD AUTO: 1.84 10*3/MM3 (ref 0.7–3.1)
LYMPHOCYTES NFR BLD AUTO: 17 % (ref 19.6–45.3)
MAGNESIUM SERPL-MCNC: 1.5 MG/DL (ref 1.6–2.4)
MCH RBC QN AUTO: 25.7 PG (ref 26.6–33)
MCHC RBC AUTO-ENTMCNC: 30.1 G/DL (ref 31.5–35.7)
MCV RBC AUTO: 85.4 FL (ref 79–97)
MONOCYTES # BLD AUTO: 0.62 10*3/MM3 (ref 0.1–0.9)
MONOCYTES NFR BLD AUTO: 5.7 % (ref 5–12)
NEUTROPHILS NFR BLD AUTO: 74.7 % (ref 42.7–76)
NEUTROPHILS NFR BLD AUTO: 8.08 10*3/MM3 (ref 1.7–7)
NRBC BLD AUTO-RTO: 0 /100 WBC (ref 0–0.2)
PHOSPHATE SERPL-MCNC: 3.1 MG/DL (ref 2.5–4.5)
PLATELET # BLD AUTO: 255 10*3/MM3 (ref 140–450)
PMV BLD AUTO: 10.2 FL (ref 6–12)
POTASSIUM SERPL-SCNC: 4.1 MMOL/L (ref 3.5–5.2)
PROT SERPL-MCNC: 5.3 G/DL (ref 6–8.5)
RBC # BLD AUTO: 3.15 10*6/MM3 (ref 3.77–5.28)
RH BLD: POSITIVE
SODIUM SERPL-SCNC: 142 MMOL/L (ref 136–145)
T&S EXPIRATION DATE: NORMAL
WBC NRBC COR # BLD: 10.82 10*3/MM3 (ref 3.4–10.8)

## 2022-01-28 PROCEDURE — 99233 SBSQ HOSP IP/OBS HIGH 50: CPT | Performed by: FAMILY MEDICINE

## 2022-01-28 PROCEDURE — 25010000002 HEPARIN (PORCINE) PER 1000 UNITS: Performed by: SURGERY

## 2022-01-28 PROCEDURE — 80076 HEPATIC FUNCTION PANEL: CPT | Performed by: FAMILY MEDICINE

## 2022-01-28 PROCEDURE — 86901 BLOOD TYPING SEROLOGIC RH(D): CPT | Performed by: FAMILY MEDICINE

## 2022-01-28 PROCEDURE — 86850 RBC ANTIBODY SCREEN: CPT | Performed by: FAMILY MEDICINE

## 2022-01-28 PROCEDURE — 25010000002 MORPHINE PER 10 MG: Performed by: SURGERY

## 2022-01-28 PROCEDURE — C1751 CATH, INF, PER/CENT/MIDLINE: HCPCS

## 2022-01-28 PROCEDURE — 84100 ASSAY OF PHOSPHORUS: CPT | Performed by: FAMILY MEDICINE

## 2022-01-28 PROCEDURE — 86900 BLOOD TYPING SEROLOGIC ABO: CPT | Performed by: FAMILY MEDICINE

## 2022-01-28 PROCEDURE — 83735 ASSAY OF MAGNESIUM: CPT | Performed by: FAMILY MEDICINE

## 2022-01-28 PROCEDURE — 86923 COMPATIBILITY TEST ELECTRIC: CPT

## 2022-01-28 PROCEDURE — 80048 BASIC METABOLIC PNL TOTAL CA: CPT | Performed by: FAMILY MEDICINE

## 2022-01-28 PROCEDURE — 36410 VNPNXR 3YR/> PHY/QHP DX/THER: CPT

## 2022-01-28 PROCEDURE — 86900 BLOOD TYPING SEROLOGIC ABO: CPT

## 2022-01-28 PROCEDURE — P9016 RBC LEUKOCYTES REDUCED: HCPCS

## 2022-01-28 PROCEDURE — 85025 COMPLETE CBC W/AUTO DIFF WBC: CPT | Performed by: FAMILY MEDICINE

## 2022-01-28 PROCEDURE — 36430 TRANSFUSION BLD/BLD COMPNT: CPT

## 2022-01-28 RX ADMIN — OXYCODONE HYDROCHLORIDE AND ACETAMINOPHEN 1 TABLET: 10; 325 TABLET ORAL at 04:34

## 2022-01-28 RX ADMIN — HEPARIN SODIUM 5000 UNITS: 5000 INJECTION, SOLUTION INTRAVENOUS; SUBCUTANEOUS at 06:04

## 2022-01-28 RX ADMIN — HEPARIN SODIUM 5000 UNITS: 5000 INJECTION, SOLUTION INTRAVENOUS; SUBCUTANEOUS at 13:38

## 2022-01-28 RX ADMIN — APIXABAN 5 MG: 5 TABLET, FILM COATED ORAL at 20:37

## 2022-01-28 RX ADMIN — BUSPIRONE HYDROCHLORIDE 15 MG: 15 TABLET ORAL at 09:18

## 2022-01-28 RX ADMIN — ASPIRIN 81 MG: 81 TABLET, COATED ORAL at 09:18

## 2022-01-28 RX ADMIN — METOPROLOL TARTRATE 100 MG: 25 TABLET, FILM COATED ORAL at 20:35

## 2022-01-28 RX ADMIN — BUSPIRONE HYDROCHLORIDE 15 MG: 15 TABLET ORAL at 15:14

## 2022-01-28 RX ADMIN — Medication 5 MG: at 23:17

## 2022-01-28 RX ADMIN — QUETIAPINE FUMARATE 100 MG: 200 TABLET ORAL at 20:36

## 2022-01-28 RX ADMIN — LEVETIRACETAM 500 MG: 500 TABLET, FILM COATED ORAL at 20:36

## 2022-01-28 RX ADMIN — OXYCODONE HYDROCHLORIDE AND ACETAMINOPHEN 1 TABLET: 10; 325 TABLET ORAL at 23:17

## 2022-01-28 RX ADMIN — LEVETIRACETAM 500 MG: 500 TABLET, FILM COATED ORAL at 09:19

## 2022-01-28 RX ADMIN — OXYCODONE HYDROCHLORIDE AND ACETAMINOPHEN 1 TABLET: 10; 325 TABLET ORAL at 15:14

## 2022-01-28 RX ADMIN — MORPHINE SULFATE 4 MG: 4 INJECTION INTRAVENOUS at 13:37

## 2022-01-28 RX ADMIN — METOPROLOL TARTRATE 100 MG: 25 TABLET, FILM COATED ORAL at 09:19

## 2022-01-28 RX ADMIN — BUSPIRONE HYDROCHLORIDE 15 MG: 15 TABLET ORAL at 20:36

## 2022-01-28 RX ADMIN — MORPHINE SULFATE 2 MG: 2 INJECTION, SOLUTION INTRAMUSCULAR; INTRAVENOUS at 20:59

## 2022-01-28 RX ADMIN — BUPROPION HYDROCHLORIDE 150 MG: 150 TABLET, EXTENDED RELEASE ORAL at 09:18

## 2022-01-28 RX ADMIN — MORPHINE SULFATE 2 MG: 2 INJECTION, SOLUTION INTRAMUSCULAR; INTRAVENOUS at 16:43

## 2022-01-28 RX ADMIN — SODIUM CHLORIDE, PRESERVATIVE FREE 10 ML: 5 INJECTION INTRAVENOUS at 16:44

## 2022-01-28 RX ADMIN — SODIUM CHLORIDE, PRESERVATIVE FREE 10 ML: 5 INJECTION INTRAVENOUS at 13:38

## 2022-01-28 RX ADMIN — DIGOXIN 125 MCG: 125 TABLET ORAL at 13:38

## 2022-01-28 RX ADMIN — ATORVASTATIN CALCIUM 10 MG: 10 TABLET, FILM COATED ORAL at 20:36

## 2022-01-29 VITALS
WEIGHT: 186.51 LBS | DIASTOLIC BLOOD PRESSURE: 78 MMHG | RESPIRATION RATE: 18 BRPM | OXYGEN SATURATION: 92 % | HEART RATE: 89 BPM | TEMPERATURE: 98.6 F | HEIGHT: 66 IN | BODY MASS INDEX: 29.97 KG/M2 | SYSTOLIC BLOOD PRESSURE: 111 MMHG

## 2022-01-29 PROBLEM — Z89.612 S/P AKA (ABOVE KNEE AMPUTATION) BILATERAL: Status: ACTIVE | Noted: 2022-01-29

## 2022-01-29 PROBLEM — Z89.611 S/P AKA (ABOVE KNEE AMPUTATION) BILATERAL: Status: ACTIVE | Noted: 2022-01-29

## 2022-01-29 PROBLEM — I15.9 SECONDARY HYPERTENSION: Status: ACTIVE | Noted: 2020-05-26

## 2022-01-29 PROBLEM — I48.19 PERSISTENT ATRIAL FIBRILLATION (HCC): Status: ACTIVE | Noted: 2022-01-29

## 2022-01-29 PROBLEM — Z86.73 HISTORY OF CVA (CEREBROVASCULAR ACCIDENT): Status: ACTIVE | Noted: 2022-01-29

## 2022-01-29 PROBLEM — E11.40 TYPE 2 DIABETES MELLITUS WITH DIABETIC NEUROPATHY, UNSPECIFIED (HCC): Status: ACTIVE | Noted: 2020-05-26

## 2022-01-29 PROBLEM — E78.5 HYPERLIPIDEMIA, UNSPECIFIED: Status: ACTIVE | Noted: 2020-05-26

## 2022-01-29 PROBLEM — E66.01 MORBID OBESITY DUE TO EXCESS CALORIES (HCC): Status: ACTIVE | Noted: 2020-05-26

## 2022-01-29 PROBLEM — F31.9 BIPOLAR DISORDER, UNSPECIFIED (HCC): Status: ACTIVE | Noted: 2020-05-26

## 2022-01-29 PROBLEM — I73.9 PERIPHERAL VASCULAR DISEASE, UNSPECIFIED (HCC): Status: ACTIVE | Noted: 2020-05-26

## 2022-01-29 PROBLEM — I50.32 CHRONIC DIASTOLIC CHF (CONGESTIVE HEART FAILURE) (HCC): Status: ACTIVE | Noted: 2022-01-29

## 2022-01-29 PROBLEM — D62 ACUTE BLOOD LOSS ANEMIA: Status: ACTIVE | Noted: 2022-01-29

## 2022-01-29 LAB
ALBUMIN SERPL-MCNC: 2.3 G/DL (ref 3.5–5.2)
ALP SERPL-CCNC: 71 U/L (ref 39–117)
ALT SERPL W P-5'-P-CCNC: 7 U/L (ref 1–33)
ANION GAP SERPL CALCULATED.3IONS-SCNC: 5.6 MMOL/L (ref 5–15)
AST SERPL-CCNC: 9 U/L (ref 1–32)
BASOPHILS # BLD AUTO: 0.04 10*3/MM3 (ref 0–0.2)
BASOPHILS NFR BLD AUTO: 0.5 % (ref 0–1.5)
BILIRUB CONJ SERPL-MCNC: <0.2 MG/DL (ref 0–0.3)
BILIRUB INDIRECT SERPL-MCNC: ABNORMAL MG/DL
BILIRUB SERPL-MCNC: 0.2 MG/DL (ref 0–1.2)
BUN SERPL-MCNC: 10 MG/DL (ref 8–23)
BUN/CREAT SERPL: 14.1 (ref 7–25)
CALCIUM SPEC-SCNC: 7.9 MG/DL (ref 8.6–10.5)
CHLORIDE SERPL-SCNC: 105 MMOL/L (ref 98–107)
CO2 SERPL-SCNC: 29.4 MMOL/L (ref 22–29)
CREAT SERPL-MCNC: 0.71 MG/DL (ref 0.57–1)
DEPRECATED RDW RBC AUTO: 49.8 FL (ref 37–54)
EOSINOPHIL # BLD AUTO: 0.2 10*3/MM3 (ref 0–0.4)
EOSINOPHIL NFR BLD AUTO: 2.3 % (ref 0.3–6.2)
ERYTHROCYTE [DISTWIDTH] IN BLOOD BY AUTOMATED COUNT: 16.7 % (ref 12.3–15.4)
GFR SERPL CREATININE-BSD FRML MDRD: 81 ML/MIN/1.73
GLUCOSE SERPL-MCNC: 180 MG/DL (ref 65–99)
HCT VFR BLD AUTO: 27.2 % (ref 34–46.6)
HGB BLD-MCNC: 8.3 G/DL (ref 12–15.9)
IMM GRANULOCYTES # BLD AUTO: 0.07 10*3/MM3 (ref 0–0.05)
IMM GRANULOCYTES NFR BLD AUTO: 0.8 % (ref 0–0.5)
IRON 24H UR-MRATE: 41 MCG/DL (ref 37–145)
IRON SATN MFR SERPL: 30 % (ref 20–50)
LYMPHOCYTES # BLD AUTO: 2.42 10*3/MM3 (ref 0.7–3.1)
LYMPHOCYTES NFR BLD AUTO: 28.2 % (ref 19.6–45.3)
MAGNESIUM SERPL-MCNC: 1.7 MG/DL (ref 1.6–2.4)
MCH RBC QN AUTO: 25.5 PG (ref 26.6–33)
MCHC RBC AUTO-ENTMCNC: 30.5 G/DL (ref 31.5–35.7)
MCV RBC AUTO: 83.4 FL (ref 79–97)
MONOCYTES # BLD AUTO: 0.54 10*3/MM3 (ref 0.1–0.9)
MONOCYTES NFR BLD AUTO: 6.3 % (ref 5–12)
NEUTROPHILS NFR BLD AUTO: 5.3 10*3/MM3 (ref 1.7–7)
NEUTROPHILS NFR BLD AUTO: 61.9 % (ref 42.7–76)
NRBC BLD AUTO-RTO: 0 /100 WBC (ref 0–0.2)
PHOSPHATE SERPL-MCNC: 3.3 MG/DL (ref 2.5–4.5)
PLATELET # BLD AUTO: 247 10*3/MM3 (ref 140–450)
PMV BLD AUTO: 9.8 FL (ref 6–12)
POTASSIUM SERPL-SCNC: 4.4 MMOL/L (ref 3.5–5.2)
PROT SERPL-MCNC: 5.3 G/DL (ref 6–8.5)
RBC # BLD AUTO: 3.26 10*6/MM3 (ref 3.77–5.28)
SODIUM SERPL-SCNC: 140 MMOL/L (ref 136–145)
TIBC SERPL-MCNC: 139 MCG/DL (ref 298–536)
TRANSFERRIN SERPL-MCNC: 93 MG/DL (ref 200–360)
WBC NRBC COR # BLD: 8.57 10*3/MM3 (ref 3.4–10.8)

## 2022-01-29 PROCEDURE — 84100 ASSAY OF PHOSPHORUS: CPT | Performed by: FAMILY MEDICINE

## 2022-01-29 PROCEDURE — 84466 ASSAY OF TRANSFERRIN: CPT | Performed by: SPECIALIST

## 2022-01-29 PROCEDURE — 80076 HEPATIC FUNCTION PANEL: CPT | Performed by: FAMILY MEDICINE

## 2022-01-29 PROCEDURE — 83735 ASSAY OF MAGNESIUM: CPT | Performed by: FAMILY MEDICINE

## 2022-01-29 PROCEDURE — 25010000002 MORPHINE PER 10 MG: Performed by: SURGERY

## 2022-01-29 PROCEDURE — 83540 ASSAY OF IRON: CPT | Performed by: SPECIALIST

## 2022-01-29 PROCEDURE — 85025 COMPLETE CBC W/AUTO DIFF WBC: CPT | Performed by: FAMILY MEDICINE

## 2022-01-29 PROCEDURE — 99239 HOSP IP/OBS DSCHRG MGMT >30: CPT | Performed by: FAMILY MEDICINE

## 2022-01-29 PROCEDURE — 80048 BASIC METABOLIC PNL TOTAL CA: CPT | Performed by: FAMILY MEDICINE

## 2022-01-29 RX ORDER — METOPROLOL TARTRATE 100 MG/1
100 TABLET ORAL EVERY 12 HOURS SCHEDULED
Qty: 60 TABLET | Refills: 0 | Status: SHIPPED | OUTPATIENT
Start: 2022-01-29 | End: 2022-02-28

## 2022-01-29 RX ORDER — DIGOXIN 125 MCG
125 TABLET ORAL
Qty: 30 TABLET | Refills: 0 | Status: SHIPPED | OUTPATIENT
Start: 2022-01-30 | End: 2022-03-01

## 2022-01-29 RX ADMIN — OXYCODONE HYDROCHLORIDE AND ACETAMINOPHEN 1 TABLET: 10; 325 TABLET ORAL at 20:25

## 2022-01-29 RX ADMIN — METOPROLOL TARTRATE 100 MG: 25 TABLET, FILM COATED ORAL at 08:01

## 2022-01-29 RX ADMIN — OXYCODONE HYDROCHLORIDE AND ACETAMINOPHEN 1 TABLET: 10; 325 TABLET ORAL at 14:21

## 2022-01-29 RX ADMIN — QUETIAPINE FUMARATE 100 MG: 200 TABLET ORAL at 20:25

## 2022-01-29 RX ADMIN — BUSPIRONE HYDROCHLORIDE 15 MG: 15 TABLET ORAL at 08:10

## 2022-01-29 RX ADMIN — LEVETIRACETAM 500 MG: 500 TABLET, FILM COATED ORAL at 08:01

## 2022-01-29 RX ADMIN — LEVETIRACETAM 500 MG: 500 TABLET, FILM COATED ORAL at 20:25

## 2022-01-29 RX ADMIN — METOPROLOL TARTRATE 100 MG: 25 TABLET, FILM COATED ORAL at 20:24

## 2022-01-29 RX ADMIN — SODIUM CHLORIDE, PRESERVATIVE FREE 10 ML: 5 INJECTION INTRAVENOUS at 08:01

## 2022-01-29 RX ADMIN — BUSPIRONE HYDROCHLORIDE 15 MG: 15 TABLET ORAL at 15:47

## 2022-01-29 RX ADMIN — MORPHINE SULFATE 2 MG: 2 INJECTION, SOLUTION INTRAMUSCULAR; INTRAVENOUS at 12:36

## 2022-01-29 RX ADMIN — MORPHINE SULFATE 2 MG: 2 INJECTION, SOLUTION INTRAMUSCULAR; INTRAVENOUS at 18:20

## 2022-01-29 RX ADMIN — DIGOXIN 125 MCG: 125 TABLET ORAL at 11:41

## 2022-01-29 RX ADMIN — BUSPIRONE HYDROCHLORIDE 15 MG: 15 TABLET ORAL at 20:25

## 2022-01-29 RX ADMIN — APIXABAN 5 MG: 5 TABLET, FILM COATED ORAL at 08:01

## 2022-01-29 RX ADMIN — MORPHINE SULFATE 4 MG: 4 INJECTION INTRAVENOUS at 05:22

## 2022-01-29 RX ADMIN — ATORVASTATIN CALCIUM 10 MG: 10 TABLET, FILM COATED ORAL at 20:24

## 2022-01-29 RX ADMIN — APIXABAN 5 MG: 5 TABLET, FILM COATED ORAL at 20:25

## 2022-01-29 RX ADMIN — OXYCODONE HYDROCHLORIDE AND ACETAMINOPHEN 1 TABLET: 10; 325 TABLET ORAL at 08:01

## 2022-01-29 RX ADMIN — BUPROPION HYDROCHLORIDE 150 MG: 150 TABLET, EXTENDED RELEASE ORAL at 08:01

## 2022-01-30 LAB
BH BB BLOOD EXPIRATION DATE: NORMAL
BH BB BLOOD TYPE BARCODE: 6200
BH BB DISPENSE STATUS: NORMAL
BH BB PRODUCT CODE: NORMAL
BH BB UNIT NUMBER: NORMAL
CROSSMATCH INTERPRETATION: NORMAL
UNIT  ABO: NORMAL
UNIT  RH: NORMAL

## 2022-02-14 ENCOUNTER — OFFICE VISIT (OUTPATIENT)
Dept: VASCULAR SURGERY | Facility: HOSPITAL | Age: 72
End: 2022-02-14

## 2022-02-14 VITALS
TEMPERATURE: 98.2 F | DIASTOLIC BLOOD PRESSURE: 42 MMHG | SYSTOLIC BLOOD PRESSURE: 123 MMHG | OXYGEN SATURATION: 93 % | RESPIRATION RATE: 18 BRPM | HEART RATE: 72 BPM

## 2022-02-14 DIAGNOSIS — Z89.612 S/P AKA (ABOVE KNEE AMPUTATION) BILATERAL: ICD-10-CM

## 2022-02-14 DIAGNOSIS — I70.244 ATHEROSCLEROSIS OF NATIVE ARTERY OF BOTH LOWER EXTREMITIES WITH BILATERAL ULCERATION OF HEELS: Primary | ICD-10-CM

## 2022-02-14 DIAGNOSIS — I70.234 ATHEROSCLEROSIS OF NATIVE ARTERY OF BOTH LOWER EXTREMITIES WITH BILATERAL ULCERATION OF HEELS: Primary | ICD-10-CM

## 2022-02-14 DIAGNOSIS — Z89.611 S/P AKA (ABOVE KNEE AMPUTATION) BILATERAL: ICD-10-CM

## 2022-02-14 PROCEDURE — G0463 HOSPITAL OUTPT CLINIC VISIT: HCPCS | Performed by: SURGERY

## 2022-02-14 PROCEDURE — 99024 POSTOP FOLLOW-UP VISIT: CPT | Performed by: SURGERY

## 2022-02-14 NOTE — PROGRESS NOTES
[facility]   Follow up Office    Patient Name: Chantal Kumar  : 1950  MRN: [id number]  Primary Care Physician:  Sasha Bui MD      Subjective   Subjective     HPI:    Chantal Kumar is a 71 y.o. female here for routine follow-up after bilateral above-knee amputation 2022.  No complaints.      Objective     Vitals:   Temp:  [98.2 °F (36.8 °C)] 98.2 °F (36.8 °C)  Heart Rate:  [72] 72  Resp:  [18] 18  BP: (123)/(42) 123/42    Physical Exam      General: Alert, no acute distress  Extremities: Status post bilateral above-knee amputations.  Wounds: Staple lines intact, no erythema, no drainage.    Assessment/Plan   Assessment / Plan     Diagnoses and all orders for this visit:    1. Atherosclerosis of native artery of both lower extremities with bilateral ulceration of heels (HCC) (Primary)    2. S/P AKA (above knee amputation) bilateral (HCC)       Assessment/Plan:   Satisfactory progress following bilateral above-knee amputations in a patient with extensive nonhealing wounds who is nonambulatory.  Staples removed and wound Steri-Stripped.  Follow-up in 2 months.          Electronically signed by Herber Rodriguez MD, 22, 12:58 PM EST.

## 2022-04-13 ENCOUNTER — OFFICE VISIT (OUTPATIENT)
Dept: VASCULAR SURGERY | Facility: HOSPITAL | Age: 72
End: 2022-04-13

## 2022-04-13 VITALS
TEMPERATURE: 96.8 F | DIASTOLIC BLOOD PRESSURE: 56 MMHG | RESPIRATION RATE: 18 BRPM | SYSTOLIC BLOOD PRESSURE: 116 MMHG | HEART RATE: 76 BPM | OXYGEN SATURATION: 96 %

## 2022-04-13 DIAGNOSIS — Z89.611 S/P AKA (ABOVE KNEE AMPUTATION) BILATERAL: Primary | ICD-10-CM

## 2022-04-13 DIAGNOSIS — Z89.612 S/P AKA (ABOVE KNEE AMPUTATION) BILATERAL: Primary | ICD-10-CM

## 2022-04-13 PROCEDURE — 99024 POSTOP FOLLOW-UP VISIT: CPT | Performed by: SURGERY

## 2022-04-13 PROCEDURE — G0463 HOSPITAL OUTPT CLINIC VISIT: HCPCS | Performed by: SURGERY

## 2022-04-13 NOTE — PROGRESS NOTES
[facility]   Follow up Office    Patient Name: Chantal Kumar  : 1950  MRN: [id number]  Primary Care Physician:  Sasha Bui MD      Subjective   Subjective     HPI:    Chantal Kumar is a 71 y.o. female here for follow-up after bilateral above-knee amputations 2022.  Doing well.  No complaints.      Objective     Vitals:   Temp:  [96.8 °F (36 °C)] 96.8 °F (36 °C)  Heart Rate:  [76] 76  Resp:  [18] 18  BP: (116)/(56) 116/56    Physical Exam      General: Alert, no acute distress  Wounds: Well-healed.    Assessment/Plan   Assessment / Plan     Diagnoses and all orders for this visit:    1. S/P AKA (above knee amputation) bilateral (HCC) (Primary)       Assessment/Plan:   Satisfactory progress following bilateral above-knee amputations.  Follow-up as needed.        Electronically signed by Herber Rodriguez MD, 22, 10:56 AM EDT.  
No

## 2024-11-20 ENCOUNTER — HOSPITAL ENCOUNTER (INPATIENT)
Facility: HOSPITAL | Age: 74
LOS: 11 days | Discharge: NURSING, REHAB OR LONG-TERM CARE | DRG: 435 | End: 2024-12-01
Attending: STUDENT IN AN ORGANIZED HEALTH CARE EDUCATION/TRAINING PROGRAM | Admitting: HOSPITALIST
Payer: MEDICARE

## 2024-11-20 DIAGNOSIS — C25.7 MALIGNANT NEOPLASM OF OTHER PARTS OF PANCREAS: ICD-10-CM

## 2024-11-20 DIAGNOSIS — K83.1 BILE DUCT OBSTRUCTION: Primary | ICD-10-CM

## 2024-11-20 DIAGNOSIS — C25.9 PANCREATIC ADENOCARCINOMA: ICD-10-CM

## 2024-11-20 LAB
ALBUMIN SERPL-MCNC: 2.8 G/DL (ref 3.5–5.2)
ALBUMIN/GLOB SERPL: 0.8 G/DL
ALP SERPL-CCNC: 850 U/L (ref 39–117)
ALT SERPL W P-5'-P-CCNC: 157 U/L (ref 1–33)
ANION GAP SERPL CALCULATED.3IONS-SCNC: 12 MMOL/L (ref 5–15)
AST SERPL-CCNC: 111 U/L (ref 1–32)
BASOPHILS # BLD AUTO: 0.03 10*3/MM3 (ref 0–0.2)
BASOPHILS NFR BLD AUTO: 0.4 % (ref 0–1.5)
BILIRUB SERPL-MCNC: 5.2 MG/DL (ref 0–1.2)
BUN SERPL-MCNC: 14 MG/DL (ref 8–23)
BUN/CREAT SERPL: 15.6 (ref 7–25)
CALCIUM SPEC-SCNC: 8.8 MG/DL (ref 8.6–10.5)
CHLORIDE SERPL-SCNC: 106 MMOL/L (ref 98–107)
CO2 SERPL-SCNC: 19 MMOL/L (ref 22–29)
CREAT SERPL-MCNC: 0.9 MG/DL (ref 0.57–1)
CRP SERPL-MCNC: 6.57 MG/DL (ref 0–0.5)
DEPRECATED RDW RBC AUTO: 43.7 FL (ref 37–54)
EGFRCR SERPLBLD CKD-EPI 2021: 67.2 ML/MIN/1.73
EOSINOPHIL # BLD AUTO: 0.03 10*3/MM3 (ref 0–0.4)
EOSINOPHIL NFR BLD AUTO: 0.4 % (ref 0.3–6.2)
ERYTHROCYTE [DISTWIDTH] IN BLOOD BY AUTOMATED COUNT: 14.5 % (ref 12.3–15.4)
GLOBULIN UR ELPH-MCNC: 3.7 GM/DL
GLUCOSE BLDC GLUCOMTR-MCNC: 168 MG/DL (ref 70–130)
GLUCOSE BLDC GLUCOMTR-MCNC: 232 MG/DL (ref 70–130)
GLUCOSE SERPL-MCNC: 182 MG/DL (ref 65–99)
HCT VFR BLD AUTO: 43.1 % (ref 34–46.6)
HGB BLD-MCNC: 13.7 G/DL (ref 12–15.9)
IMM GRANULOCYTES # BLD AUTO: 0.05 10*3/MM3 (ref 0–0.05)
IMM GRANULOCYTES NFR BLD AUTO: 0.7 % (ref 0–0.5)
LYMPHOCYTES # BLD AUTO: 0.59 10*3/MM3 (ref 0.7–3.1)
LYMPHOCYTES NFR BLD AUTO: 7.8 % (ref 19.6–45.3)
MCH RBC QN AUTO: 26.6 PG (ref 26.6–33)
MCHC RBC AUTO-ENTMCNC: 31.8 G/DL (ref 31.5–35.7)
MCV RBC AUTO: 83.5 FL (ref 79–97)
MONOCYTES # BLD AUTO: 0.39 10*3/MM3 (ref 0.1–0.9)
MONOCYTES NFR BLD AUTO: 5.2 % (ref 5–12)
NEUTROPHILS NFR BLD AUTO: 6.47 10*3/MM3 (ref 1.7–7)
NEUTROPHILS NFR BLD AUTO: 85.5 % (ref 42.7–76)
NRBC BLD AUTO-RTO: 0 /100 WBC (ref 0–0.2)
PLATELET # BLD AUTO: 178 10*3/MM3 (ref 140–450)
PMV BLD AUTO: 10.7 FL (ref 6–12)
POTASSIUM SERPL-SCNC: 4.3 MMOL/L (ref 3.5–5.2)
PROCALCITONIN SERPL-MCNC: 0.12 NG/ML (ref 0–0.25)
PROT SERPL-MCNC: 6.5 G/DL (ref 6–8.5)
RBC # BLD AUTO: 5.16 10*6/MM3 (ref 3.77–5.28)
SODIUM SERPL-SCNC: 137 MMOL/L (ref 136–145)
WBC NRBC COR # BLD AUTO: 7.56 10*3/MM3 (ref 3.4–10.8)

## 2024-11-20 PROCEDURE — 85025 COMPLETE CBC W/AUTO DIFF WBC: CPT | Performed by: HOSPITALIST

## 2024-11-20 PROCEDURE — 80053 COMPREHEN METABOLIC PANEL: CPT | Performed by: HOSPITALIST

## 2024-11-20 PROCEDURE — 82948 REAGENT STRIP/BLOOD GLUCOSE: CPT

## 2024-11-20 PROCEDURE — 87481 CANDIDA DNA AMP PROBE: CPT | Performed by: HOSPITALIST

## 2024-11-20 PROCEDURE — 63710000001 INSULIN GLARGINE PER 5 UNITS: Performed by: HOSPITALIST

## 2024-11-20 PROCEDURE — 84145 PROCALCITONIN (PCT): CPT | Performed by: HOSPITALIST

## 2024-11-20 PROCEDURE — 99222 1ST HOSP IP/OBS MODERATE 55: CPT | Performed by: INTERNAL MEDICINE

## 2024-11-20 PROCEDURE — 86140 C-REACTIVE PROTEIN: CPT | Performed by: HOSPITALIST

## 2024-11-20 PROCEDURE — 63710000001 INSULIN LISPRO (HUMAN) PER 5 UNITS: Performed by: HOSPITALIST

## 2024-11-20 RX ORDER — CHOLESTYRAMINE LIGHT 4 G/5.7G
1 POWDER, FOR SUSPENSION ORAL DAILY
Status: DISCONTINUED | OUTPATIENT
Start: 2024-11-20 | End: 2024-12-01 | Stop reason: HOSPADM

## 2024-11-20 RX ORDER — DIGOXIN 125 MCG
125 TABLET ORAL
Status: DISCONTINUED | OUTPATIENT
Start: 2024-11-21 | End: 2024-12-01 | Stop reason: HOSPADM

## 2024-11-20 RX ORDER — QUETIAPINE FUMARATE 100 MG/1
100 TABLET, FILM COATED ORAL NIGHTLY
COMMUNITY

## 2024-11-20 RX ORDER — METOPROLOL TARTRATE 50 MG
50 TABLET ORAL 2 TIMES DAILY
Status: DISCONTINUED | OUTPATIENT
Start: 2024-11-20 | End: 2024-11-23

## 2024-11-20 RX ORDER — BACLOFEN 10 MG/1
10 TABLET ORAL 2 TIMES DAILY
Status: DISCONTINUED | OUTPATIENT
Start: 2024-11-20 | End: 2024-12-01 | Stop reason: HOSPADM

## 2024-11-20 RX ORDER — SODIUM CHLORIDE 0.9 % (FLUSH) 0.9 %
10 SYRINGE (ML) INJECTION AS NEEDED
Status: DISCONTINUED | OUTPATIENT
Start: 2024-11-20 | End: 2024-12-01 | Stop reason: HOSPADM

## 2024-11-20 RX ORDER — TRAZODONE HYDROCHLORIDE 50 MG/1
50 TABLET, FILM COATED ORAL NIGHTLY
Status: DISCONTINUED | OUTPATIENT
Start: 2024-11-20 | End: 2024-12-01 | Stop reason: HOSPADM

## 2024-11-20 RX ORDER — PRAZOSIN HYDROCHLORIDE 2 MG/1
2 CAPSULE ORAL NIGHTLY
COMMUNITY

## 2024-11-20 RX ORDER — ATORVASTATIN CALCIUM 10 MG/1
10 TABLET, FILM COATED ORAL DAILY
COMMUNITY
End: 2024-12-01 | Stop reason: HOSPADM

## 2024-11-20 RX ORDER — TRAMADOL HYDROCHLORIDE 50 MG/1
50 TABLET ORAL EVERY 8 HOURS PRN
Status: DISCONTINUED | OUTPATIENT
Start: 2024-11-20 | End: 2024-12-01 | Stop reason: HOSPADM

## 2024-11-20 RX ORDER — NICOTINE POLACRILEX 4 MG
15 LOZENGE BUCCAL
Status: DISCONTINUED | OUTPATIENT
Start: 2024-11-20 | End: 2024-12-01 | Stop reason: HOSPADM

## 2024-11-20 RX ORDER — INSULIN LISPRO 100 [IU]/ML
2-7 INJECTION, SOLUTION INTRAVENOUS; SUBCUTANEOUS
Status: DISCONTINUED | OUTPATIENT
Start: 2024-11-20 | End: 2024-11-30

## 2024-11-20 RX ORDER — PRAZOSIN HYDROCHLORIDE 2 MG/1
2 CAPSULE ORAL NIGHTLY
Status: DISCONTINUED | OUTPATIENT
Start: 2024-11-20 | End: 2024-11-24

## 2024-11-20 RX ORDER — FERROUS SULFATE 325(65) MG
325 TABLET ORAL
Status: DISCONTINUED | OUTPATIENT
Start: 2024-11-21 | End: 2024-12-01 | Stop reason: HOSPADM

## 2024-11-20 RX ORDER — DEXTROSE MONOHYDRATE 25 G/50ML
25 INJECTION, SOLUTION INTRAVENOUS
Status: DISCONTINUED | OUTPATIENT
Start: 2024-11-20 | End: 2024-12-01 | Stop reason: HOSPADM

## 2024-11-20 RX ORDER — BUSPIRONE HYDROCHLORIDE 15 MG/1
15 TABLET ORAL 3 TIMES DAILY
Status: DISCONTINUED | OUTPATIENT
Start: 2024-11-20 | End: 2024-12-01 | Stop reason: HOSPADM

## 2024-11-20 RX ORDER — DIGOXIN 125 MCG
125 TABLET ORAL
COMMUNITY
Start: 2024-11-01

## 2024-11-20 RX ORDER — LEVETIRACETAM 500 MG/1
500 TABLET ORAL 2 TIMES DAILY
Status: DISCONTINUED | OUTPATIENT
Start: 2024-11-20 | End: 2024-12-01 | Stop reason: HOSPADM

## 2024-11-20 RX ORDER — QUETIAPINE FUMARATE 50 MG/1
100 TABLET, FILM COATED ORAL NIGHTLY
Status: DISCONTINUED | OUTPATIENT
Start: 2024-11-20 | End: 2024-11-23

## 2024-11-20 RX ORDER — ASCORBIC ACID 500 MG
500 TABLET ORAL DAILY
COMMUNITY

## 2024-11-20 RX ORDER — SODIUM CHLORIDE 9 MG/ML
40 INJECTION, SOLUTION INTRAVENOUS AS NEEDED
Status: DISCONTINUED | OUTPATIENT
Start: 2024-11-20 | End: 2024-12-01 | Stop reason: HOSPADM

## 2024-11-20 RX ORDER — SODIUM CHLORIDE 0.9 % (FLUSH) 0.9 %
10 SYRINGE (ML) INJECTION EVERY 12 HOURS SCHEDULED
Status: DISCONTINUED | OUTPATIENT
Start: 2024-11-20 | End: 2024-12-01 | Stop reason: HOSPADM

## 2024-11-20 RX ORDER — POTASSIUM CHLORIDE 750 MG/1
20 TABLET, EXTENDED RELEASE ORAL 2 TIMES DAILY
COMMUNITY

## 2024-11-20 RX ORDER — METOPROLOL TARTRATE 100 MG/1
100 TABLET ORAL 2 TIMES DAILY
COMMUNITY

## 2024-11-20 RX ORDER — ATORVASTATIN CALCIUM 20 MG/1
10 TABLET, FILM COATED ORAL DAILY
Status: DISCONTINUED | OUTPATIENT
Start: 2024-11-20 | End: 2024-12-01 | Stop reason: HOSPADM

## 2024-11-20 RX ORDER — MULTIPLE VITAMINS W/ MINERALS TAB 9MG-400MCG
1 TAB ORAL DAILY
Status: DISCONTINUED | OUTPATIENT
Start: 2024-11-20 | End: 2024-12-01 | Stop reason: HOSPADM

## 2024-11-20 RX ORDER — CILOSTAZOL 100 MG/1
100 TABLET ORAL 2 TIMES DAILY
Status: DISCONTINUED | OUTPATIENT
Start: 2024-11-20 | End: 2024-12-01 | Stop reason: HOSPADM

## 2024-11-20 RX ORDER — IBUPROFEN 600 MG/1
1 TABLET ORAL
Status: DISCONTINUED | OUTPATIENT
Start: 2024-11-20 | End: 2024-12-01 | Stop reason: HOSPADM

## 2024-11-20 RX ORDER — BUPROPION HYDROCHLORIDE 150 MG/1
150 TABLET ORAL DAILY
Status: DISCONTINUED | OUTPATIENT
Start: 2024-11-20 | End: 2024-12-01 | Stop reason: HOSPADM

## 2024-11-20 RX ORDER — ASPIRIN 81 MG/1
81 TABLET ORAL DAILY
Status: DISCONTINUED | OUTPATIENT
Start: 2024-11-20 | End: 2024-12-01 | Stop reason: HOSPADM

## 2024-11-20 RX ADMIN — Medication 1 TABLET: at 22:42

## 2024-11-20 RX ADMIN — ASPIRIN 81 MG: 81 TABLET, COATED ORAL at 22:42

## 2024-11-20 RX ADMIN — BACLOFEN 10 MG: 10 TABLET ORAL at 22:41

## 2024-11-20 RX ADMIN — BUPROPION HYDROCHLORIDE 150 MG: 150 TABLET, EXTENDED RELEASE ORAL at 22:41

## 2024-11-20 RX ADMIN — Medication 10 ML: at 21:00

## 2024-11-20 RX ADMIN — LEVETIRACETAM 500 MG: 500 TABLET, FILM COATED ORAL at 22:42

## 2024-11-20 RX ADMIN — BUSPIRONE HYDROCHLORIDE 15 MG: 15 TABLET ORAL at 22:40

## 2024-11-20 RX ADMIN — MILNACIPRAN HYDROCHLORIDE 50 MG: 50 TABLET, FILM COATED ORAL at 22:41

## 2024-11-20 RX ADMIN — INSULIN GLARGINE 20 UNITS: 100 INJECTION, SOLUTION SUBCUTANEOUS at 22:42

## 2024-11-20 RX ADMIN — CILOSTAZOL 100 MG: 100 TABLET ORAL at 22:41

## 2024-11-20 RX ADMIN — QUETIAPINE FUMARATE 100 MG: 50 TABLET ORAL at 22:40

## 2024-11-20 RX ADMIN — METOPROLOL TARTRATE 50 MG: 50 TABLET, FILM COATED ORAL at 23:50

## 2024-11-20 RX ADMIN — ATORVASTATIN CALCIUM 10 MG: 20 TABLET, FILM COATED ORAL at 22:40

## 2024-11-20 RX ADMIN — INSULIN LISPRO 3 UNITS: 100 INJECTION, SOLUTION INTRAVENOUS; SUBCUTANEOUS at 22:42

## 2024-11-20 RX ADMIN — PRAZOSIN HYDROCHLORIDE 2 MG: 2 CAPSULE ORAL at 22:40

## 2024-11-20 RX ADMIN — EMPAGLIFLOZIN 10 MG: 10 TABLET, FILM COATED ORAL at 22:40

## 2024-11-20 RX ADMIN — TRAZODONE HYDROCHLORIDE 50 MG: 50 TABLET ORAL at 22:40

## 2024-11-20 NOTE — NURSING NOTE
"S/w Meronece Sarah to update that patient has arrived to room p487. I reported she may need to have a procedure completed, but we will update her after the physicians have rounded and orders are placed. Sarah reported \"patient does have a KORTNEY but she has MR and they have not spoken in years.\" We have no contact for KORTNEY. Sarah reports patient is normally alert and oriented and makes decisions for herself. Primary RN will update corie with plan.   "

## 2024-11-20 NOTE — PROGRESS NOTES
Discharge Planning Assessment  [facility]     Patient Name: Chantal Kumar  MRN: [id number]  Today's Date: 11/20/2024    Admit Date: 11/20/2024    Plan: pending   Discharge Needs Assessment    No documentation.                  Discharge Plan       Row Name 11/20/24 1515       Plan    Plan pending    Plan Comments The patient transferred to [facility] from [facility]. CCP will screen and follow for any needs. KATYA Dowling RN, CCP.                  Continued Care and Services - Admitted Since 11/20/2024    No active coordination exists for this encounter.       Expected Discharge Date and Time       Expected Discharge Date Expected Discharge Time    Nov 23, 2024            Demographic Summary    No documentation.                  Functional Status    No documentation.                  Psychosocial    No documentation.                  Abuse/Neglect    No documentation.                  Legal    No documentation.                  Substance Abuse    No documentation.                  Patient Forms    No documentation.                     Vikki Dowling RN

## 2024-11-20 NOTE — H&P
[facility]   HISTORY AND PHYSICAL    Patient Name: Chantal Kumar  : 1950  MRN: [id number]  Primary Care Physician:  Sasha Bui MD  Date of admission: 2024    Subjective   Subjective     Chief Complaint:     History of Present Illness    74-year-old female history of diabetes who has some abdominal discomfort.  She was evaluated in the emergency room for this.  CT scan showed dilated intra and extrahepatic biliary tree and a distal common bile duct obstruction from pancreatic mass.  Patient denied abdominal pain to the gastroenterologist but is having some abdominal pain when I am seeing her.  Patient has had some diarrhea.  She states she has had significant weight loss.      Review of Systems     Personal History     Past Medical History:   Diagnosis Date    Arthritis     CHF (congestive heart failure)     Depression     Diabetes mellitus     Hyperlipidemia     Hypertension     Migraine     Osteoporosis     Seizures     Stroke        Past Surgical History:   Procedure Laterality Date    ABOVE KNEE AMPUTATION Bilateral 2022    Procedure: Bilateral above the knee amputation;  Surgeon: Herber Rodriguez MD;  Location: [facility];  Service: Vascular;  Laterality: Bilateral;       Family History: family history is not on file. Otherwise pertinent FHx was reviewed and not pertinent to current issue.    Social History:  reports that she has never smoked. She has never used smokeless tobacco. She reports that she does not drink alcohol and does not use drugs.    Home Medications:  Ferrous Fumarate, QUEtiapine, apixaban, aspirin, atorvastatin, baclofen, buPROPion XL, busPIRone, cilostazol, colestipol, digoxin, fentaNYL, furosemide, glimepiride, insulin lispro, l-methylfolate calcium, levETIRAcetam, metoprolol tartrate, milnacipran, multivitamin with minerals, omeprazole, tiZANidine, traMADol, and traZODone    Allergies:  No Known Allergies    Objective    Objective     Vitals:   Temp:   [97.3 °F (36.3 °C)] 97.3 °F (36.3 °C)  Heart Rate:  [84] 84  Resp:  [18] 18  BP: (124)/(71) 124/71    Physical Exam  Constitutional:       General: She is not in acute distress.  Pulmonary:      Effort: Pulmonary effort is normal.      Breath sounds: Normal breath sounds.   Abdominal:      General: There is no distension.      Palpations: Abdomen is soft.      Tenderness: There is no abdominal tenderness.   Musculoskeletal:      Comments: Bilateral above-the-knee amputations noted   Skin:     General: Skin is warm and dry.      Comments: Wound with no evidence of infection   Neurological:      General: No focal deficit present.      Mental Status: She is alert.   Psychiatric:         Mood and Affect: Mood normal.         Behavior: Behavior normal.         Result Review    Result Review:  I have personally reviewed the results from the time of this admission to 11/20/2024 12:16 EST and agree with these findings:  []  Laboratory list / accordion  [x]  Microbiology  [x]  Radiology  []  EKG/Telemetry   [x]  Cardiology/Vascular   []  Pathology  [x]  Old records  []  Other:  Most notable findings include: elevated bilirubin      Assessment & Plan   Assessment / Plan     Brief Patient Summary:  Chantal Kumar is a 74 y.o. female who elevated bilirubin    Active Hospital Problems:  Active Hospital Problems    Diagnosis     **Bile duct obstruction     Persistent atrial fibrillation     S/P AKA (above knee amputation) bilateral     Bipolar disorder, unspecified     Secondary hypertension     Type 2 diabetes mellitus with diabetic neuropathy, unspecified      Plan:   Bile duct stone with jaundice and elevated bilirubin 3.8  -ERCP in the a.m.  -Gastroenterology  -NPO after midnight    Concern for osteomyelitis around decubitus ulcer  -Check procalcitonin level here, CRP  -Hold on antibiotics  -Pictures of wound in chart, the wound does not appear infected by exam  -infectious disease consult    Atrial fibrillation for  which she is on Eliquis  -We will hold on Eliquis for now    History of pressure ulcer and status post bilateral above-the-knee amputation  -Likely from severe peripheral vascular disease for which she is on aspirin and Pletal    Seizure history  -Continue with Keppra              VTE Prophylaxis:  Mechanical VTE prophylaxis orders are present.        CODE STATUS:       Admission Status:  I believe this patient meets  status.    Jeramy Briggs MD

## 2024-11-20 NOTE — CONSULTS
"[facility] [place] by [facility]  Initial Inpatient Consult Note    Referring Provider: Jeramy Briggs MD    Reason for Consultation: Distal CBD obstruction from a pancreatic mass    Subjective     History of present illness:    Patient is a very pleasant 74-year-old female who has been transferred from [facility] to [facility], [place], operated by [facility] as a direct admit.  Patient was apparently admitted there with generally not feeling well.  During her evaluation a CT scan of the abdomen showed dilated intra and extrahepatic biliary tree and a distal CBD obstruction from a pancreatic mass.  Patient had a CT scan and MRI in epic everywhere from [facility].  Incidentally patient denies any history of epigastric or upper abdominal pain.  In fact, she points to the right lower quadrant as an area of ache.  Her only other symptom is \"greenish watery diarrhea.  She also mentions having lost more than 100 pounds although she is not able to quantify and I am not certain as to authenticity of her history.    Patient states that she lives alone.  She has a daughter who is seemingly estranged and does not talk with her.    On examination she appears comfortable obese and with a soft nontender abdomen and no palpable masses.  I do not have any current labs but the labs from the [facility] show modest elevation of transaminases and bilirubin of 3-5.  Patient is on Eliquis which is on hold by Dr. Briggs from today    Past Medical History:  Past Medical History:   Diagnosis Date    Arthritis     CHF (congestive heart failure)     Chronic pain     COPD (chronic obstructive pulmonary disease)     Depression     Diabetes mellitus     GERD (gastroesophageal reflux disease)     Hyperlipidemia     Hypertension     Migraine     Osteoporosis     Seizures     Stroke      Past Surgical History:  Past Surgical History:   Procedure Laterality Date    ABOVE KNEE AMPUTATION Bilateral 1/25/2022    Procedure: Bilateral " above the knee amputation;  Surgeon: Herber Rodriguez MD;  Location: [facility] MAIN OR;  Service: Vascular;  Laterality: Bilateral;      Social History:   Social History     Tobacco Use    Smoking status: Never    Smokeless tobacco: Never   Substance Use Topics    Alcohol use: Never      Family History:  No family history on file.    Home Meds:  Medications Prior to Admission   Medication Sig Dispense Refill Last Dose/Taking    apixaban (ELIQUIS) 5 MG tablet tablet Take 1 tablet by mouth Every 12 (Twelve) Hours. Indications: Atrial Fibrillation 60 tablet 0 11/19/2024 Morning    aspirin 81 MG EC tablet Take 1 tablet by mouth Daily.   11/19/2024 Morning    atorvastatin (LIPITOR) 10 MG tablet Take  by mouth.   11/19/2024 Morning    baclofen (LIORESAL) 10 MG tablet Take 1 tablet by mouth 2 (Two) Times a Day.   11/19/2024 Morning    buPROPion XL (WELLBUTRIN XL) 150 MG 24 hr tablet Take 1 tablet by mouth Daily.   11/19/2024 Morning    busPIRone (BUSPAR) 15 MG tablet Take 1 tablet by mouth 3 (Three) Times a Day.   11/19/2024 Noon    cilostazol (PLETAL) 100 MG tablet Take 1 tablet by mouth 2 (Two) Times a Day.   11/19/2024 Morning    colestipol (COLESTID) 1 g tablet Take 1 tablet by mouth 2 (Two) Times a Day.   11/19/2024 Morning    digoxin (LANOXIN) 125 MCG tablet Take 1 tablet by mouth Daily for 30 days. 30 tablet 0     fentaNYL (DURAGESIC) 50 MCG/HR patch Place 1 patch on the skin as directed by provider Every 72 (Seventy-Two) Hours.       Ferrous Fumarate 324 (106 Fe) MG tablet Take 324 mg by mouth Daily.       furosemide (LASIX) 20 MG tablet Take 20 mg by mouth.       glimepiride (AMARYL) 2 MG tablet Take 2 mg by mouth Every Morning Before Breakfast.       insulin lispro (humaLOG) 100 UNIT/ML injection Inject  under the skin into the appropriate area as directed 3 (Three) Times a Day Before Meals. Sliding scale       l-methylfolate calcium (DEPLIN) 7.5 MG tablet tablet Take 7.5 mg by mouth Daily.       levETIRAcetam  "(KEPPRA) 500 MG tablet Take 500 mg by mouth 2 (Two) Times a Day.       metoprolol tartrate (LOPRESSOR) 100 MG tablet Take 1 tablet by mouth Every 12 (Twelve) Hours for 30 days. 60 tablet 0     milnacipran (SAVELLA) 50 MG tablet tablet Take 50 mg by mouth Every 12 (Twelve) Hours.       multivitamin with minerals tablet tablet Take 1 tablet by mouth Daily.       omeprazole (priLOSEC) 20 MG capsule Take 20 mg by mouth Daily.       QUEtiapine (SEROquel) 200 MG tablet Take 200 mg by mouth Every Night.       tiZANidine (ZANAFLEX) 2 MG tablet Take 2 mg by mouth 2 (Two) Times a Day.       traMADol (ULTRAM) 50 MG tablet Take 50 mg by mouth Every 8 (Eight) Hours As Needed for Moderate Pain .       traZODone (DESYREL) 50 MG tablet Take 50 mg by mouth.        Current Meds:   insulin lispro, 2-7 Units, Subcutaneous, 4x Daily AC & at Bedtime  sodium chloride, 10 mL, Intravenous, Q12H      Allergies:  No Known Allergies  Review of Systems  Pertinent items are noted in HPI     Objective     Vital Signs  Temp:  [97.3 °F (36.3 °C)] 97.3 °F (36.3 °C)  Heart Rate:  [84] 84  Resp:  [18] 18  BP: (124)/(71) 124/71  Physical Exam:  General Appearance:    Alert, cooperative, in no acute distress   Head:    Normocephalic, without obvious abnormality, atraumatic   Eyes:          conjunctivae and sclerae normal, no   icterus   Throat:   no thrush, oral mucosa moist   Neck:   Supple, no adenopathy   Lungs:     Clear to auscultation bilaterally    Heart:    Regular rhythm and normal rate    Chest Wall:    No abnormalities observed   Abdomen:     Soft, nondistended, nontender; normal bowel sounds   Extremities:   no edema, no redness   Skin:   No bruising or rash   Psychiatric:  normal mood and insight     Results Review:   I reviewed the patient's new clinical results.              Invalid input(s): \"LABALBU\", \"PROT\"      No results found for: \"LIPASE\"    Radiology:  No orders to display       Assessment & Plan   Active Hospital Problems    " Diagnosis     **Bile duct obstruction        Assessment:  Patient most likely has carcinoma of the head of pancreas with distal CBD obstruction.    Plan:  Will obtain most recent labs including CBC CMP coagulation studies and CA 19-9  Will discuss with our interventional colleagues for timing of ERCP and CBD drainage  Patient can have diet as tolerated today and should be n.p.o. after midnight.      I discussed the patients findings and my recommendations with patient.    I called patient's niece john over the phone ([phone]), and left a message in her answering service          Manuel Penny M.D.  [facility]  [phone]

## 2024-11-21 ENCOUNTER — ANESTHESIA EVENT (OUTPATIENT)
Dept: GASTROENTEROLOGY | Facility: HOSPITAL | Age: 74
End: 2024-11-21
Payer: MEDICARE

## 2024-11-21 ENCOUNTER — INPATIENT HOSPITAL (OUTPATIENT)
Age: 74
End: 2024-11-21
Payer: MEDICARE

## 2024-11-21 ENCOUNTER — APPOINTMENT (OUTPATIENT)
Dept: GENERAL RADIOLOGY | Facility: HOSPITAL | Age: 74
DRG: 435 | End: 2024-11-21
Payer: MEDICARE

## 2024-11-21 ENCOUNTER — ANESTHESIA (OUTPATIENT)
Dept: GASTROENTEROLOGY | Facility: HOSPITAL | Age: 74
End: 2024-11-21
Payer: MEDICARE

## 2024-11-21 ENCOUNTER — INPATIENT HOSPITAL (OUTPATIENT)
Dept: URBAN - METROPOLITAN AREA HOSPITAL 113 | Facility: HOSPITAL | Age: 74
End: 2024-11-21
Payer: MEDICARE

## 2024-11-21 DIAGNOSIS — R94.5 ABNORMAL RESULTS OF LIVER FUNCTION STUDIES: ICD-10-CM

## 2024-11-21 DIAGNOSIS — K83.1 OBSTRUCTION OF BILE DUCT: ICD-10-CM

## 2024-11-21 LAB
C AURIS DNA SPEC QL NAA+NON-PROBE: NOT DETECTED
CANCER AG19-9 SERPL-ACNC: 904 U/ML
GLUCOSE BLDC GLUCOMTR-MCNC: 145 MG/DL (ref 70–130)
GLUCOSE BLDC GLUCOMTR-MCNC: 172 MG/DL (ref 70–130)
GLUCOSE BLDC GLUCOMTR-MCNC: 180 MG/DL (ref 70–130)
GLUCOSE BLDC GLUCOMTR-MCNC: 194 MG/DL (ref 70–130)
INR PPP: 1.27 (ref 0.9–1.1)
PROTHROMBIN TIME: 16.1 SECONDS (ref 11.7–14.2)

## 2024-11-21 PROCEDURE — 43262 ENDO CHOLANGIOPANCREATOGRAPH: CPT | Performed by: INTERNAL MEDICINE

## 2024-11-21 PROCEDURE — 82948 REAGENT STRIP/BLOOD GLUCOSE: CPT

## 2024-11-21 PROCEDURE — 0FJB8ZZ INSPECTION OF HEPATOBILIARY DUCT, VIA NATURAL OR ARTIFICIAL OPENING ENDOSCOPIC: ICD-10-PCS | Performed by: INTERNAL MEDICINE

## 2024-11-21 PROCEDURE — 25010000002 PHENYLEPHRINE 10 MG/ML SOLUTION: Performed by: ANESTHESIOLOGY

## 2024-11-21 PROCEDURE — 85610 PROTHROMBIN TIME: CPT | Performed by: INTERNAL MEDICINE

## 2024-11-21 PROCEDURE — 25810000003 LACTATED RINGERS PER 1000 ML: Performed by: ANESTHESIOLOGY

## 2024-11-21 PROCEDURE — C1769 GUIDE WIRE: HCPCS | Performed by: INTERNAL MEDICINE

## 2024-11-21 PROCEDURE — 25010000002 LIDOCAINE 2% SOLUTION: Performed by: ANESTHESIOLOGY

## 2024-11-21 PROCEDURE — 25010000002 PROPOFOL 10 MG/ML EMULSION: Performed by: ANESTHESIOLOGY

## 2024-11-21 PROCEDURE — 25010000002 GLUCAGON (RDNA) PER 1 MG: Performed by: INTERNAL MEDICINE

## 2024-11-21 PROCEDURE — 74328 X-RAY BILE DUCT ENDOSCOPY: CPT

## 2024-11-21 PROCEDURE — 25010000002 SUGAMMADEX 200 MG/2ML SOLUTION: Performed by: ANESTHESIOLOGY

## 2024-11-21 PROCEDURE — 63710000001 INSULIN GLARGINE PER 5 UNITS: Performed by: INTERNAL MEDICINE

## 2024-11-21 PROCEDURE — 25510000001 IOPAMIDOL 61 % SOLUTION: Performed by: INTERNAL MEDICINE

## 2024-11-21 PROCEDURE — 99222 1ST HOSP IP/OBS MODERATE 55: CPT | Performed by: INTERNAL MEDICINE

## 2024-11-21 PROCEDURE — 86301 IMMUNOASSAY TUMOR CA 19-9: CPT | Performed by: INTERNAL MEDICINE

## 2024-11-21 PROCEDURE — 63710000001 INSULIN LISPRO (HUMAN) PER 5 UNITS: Performed by: HOSPITALIST

## 2024-11-21 RX ORDER — DROPERIDOL 2.5 MG/ML
0.62 INJECTION, SOLUTION INTRAMUSCULAR; INTRAVENOUS
Status: DISCONTINUED | OUTPATIENT
Start: 2024-11-21 | End: 2024-11-21 | Stop reason: HOSPADM

## 2024-11-21 RX ORDER — NALOXONE HCL 0.4 MG/ML
0.2 VIAL (ML) INJECTION AS NEEDED
Status: DISCONTINUED | OUTPATIENT
Start: 2024-11-21 | End: 2024-11-21 | Stop reason: HOSPADM

## 2024-11-21 RX ORDER — LIDOCAINE HYDROCHLORIDE 20 MG/ML
INJECTION, SOLUTION INFILTRATION; PERINEURAL AS NEEDED
Status: DISCONTINUED | OUTPATIENT
Start: 2024-11-21 | End: 2024-11-21 | Stop reason: SURG

## 2024-11-21 RX ORDER — SODIUM CHLORIDE 9 MG/ML
50 INJECTION, SOLUTION INTRAVENOUS CONTINUOUS
Status: ACTIVE | OUTPATIENT
Start: 2024-11-21 | End: 2024-11-21

## 2024-11-21 RX ORDER — PROPOFOL 10 MG/ML
VIAL (ML) INTRAVENOUS AS NEEDED
Status: DISCONTINUED | OUTPATIENT
Start: 2024-11-21 | End: 2024-11-21 | Stop reason: SURG

## 2024-11-21 RX ORDER — IOPAMIDOL 612 MG/ML
INJECTION, SOLUTION INTRAVASCULAR AS NEEDED
Status: DISCONTINUED | OUTPATIENT
Start: 2024-11-21 | End: 2024-11-21 | Stop reason: HOSPADM

## 2024-11-21 RX ORDER — HYDROCODONE BITARTRATE AND ACETAMINOPHEN 5; 325 MG/1; MG/1
1 TABLET ORAL ONCE AS NEEDED
Status: DISCONTINUED | OUTPATIENT
Start: 2024-11-21 | End: 2024-11-21 | Stop reason: HOSPADM

## 2024-11-21 RX ORDER — PROMETHAZINE HYDROCHLORIDE 25 MG/1
25 SUPPOSITORY RECTAL ONCE AS NEEDED
Status: DISCONTINUED | OUTPATIENT
Start: 2024-11-21 | End: 2024-11-21 | Stop reason: HOSPADM

## 2024-11-21 RX ORDER — HYDROCODONE BITARTRATE AND ACETAMINOPHEN 7.5; 325 MG/1; MG/1
1 TABLET ORAL EVERY 4 HOURS PRN
Status: DISCONTINUED | OUTPATIENT
Start: 2024-11-21 | End: 2024-11-21 | Stop reason: HOSPADM

## 2024-11-21 RX ORDER — SODIUM CHLORIDE 0.9 % (FLUSH) 0.9 %
10 SYRINGE (ML) INJECTION AS NEEDED
Status: DISCONTINUED | OUTPATIENT
Start: 2024-11-21 | End: 2024-11-21 | Stop reason: HOSPADM

## 2024-11-21 RX ORDER — INDOMETHACIN 100 MG
SUPPOSITORY, RECTAL RECTAL AS NEEDED
Status: DISCONTINUED | OUTPATIENT
Start: 2024-11-21 | End: 2024-11-21 | Stop reason: HOSPADM

## 2024-11-21 RX ORDER — ROCURONIUM BROMIDE 10 MG/ML
INJECTION, SOLUTION INTRAVENOUS AS NEEDED
Status: DISCONTINUED | OUTPATIENT
Start: 2024-11-21 | End: 2024-11-21 | Stop reason: SURG

## 2024-11-21 RX ORDER — FLUMAZENIL 0.1 MG/ML
0.2 INJECTION INTRAVENOUS AS NEEDED
Status: DISCONTINUED | OUTPATIENT
Start: 2024-11-21 | End: 2024-11-21 | Stop reason: HOSPADM

## 2024-11-21 RX ORDER — IBUPROFEN 600 MG/1
TABLET ORAL AS NEEDED
Status: DISCONTINUED | OUTPATIENT
Start: 2024-11-21 | End: 2024-11-21 | Stop reason: HOSPADM

## 2024-11-21 RX ORDER — ONDANSETRON 2 MG/ML
4 INJECTION INTRAMUSCULAR; INTRAVENOUS EVERY 6 HOURS PRN
Status: DISCONTINUED | OUTPATIENT
Start: 2024-11-21 | End: 2024-12-01 | Stop reason: HOSPADM

## 2024-11-21 RX ORDER — IPRATROPIUM BROMIDE AND ALBUTEROL SULFATE 2.5; .5 MG/3ML; MG/3ML
3 SOLUTION RESPIRATORY (INHALATION) ONCE AS NEEDED
Status: DISCONTINUED | OUTPATIENT
Start: 2024-11-21 | End: 2024-11-21 | Stop reason: HOSPADM

## 2024-11-21 RX ORDER — PROMETHAZINE HYDROCHLORIDE 25 MG/1
25 TABLET ORAL ONCE AS NEEDED
Status: DISCONTINUED | OUTPATIENT
Start: 2024-11-21 | End: 2024-11-21 | Stop reason: HOSPADM

## 2024-11-21 RX ORDER — LABETALOL HYDROCHLORIDE 5 MG/ML
5 INJECTION, SOLUTION INTRAVENOUS
Status: DISCONTINUED | OUTPATIENT
Start: 2024-11-21 | End: 2024-11-21 | Stop reason: HOSPADM

## 2024-11-21 RX ORDER — EPHEDRINE SULFATE 50 MG/ML
5 INJECTION, SOLUTION INTRAVENOUS ONCE AS NEEDED
Status: DISCONTINUED | OUTPATIENT
Start: 2024-11-21 | End: 2024-11-21 | Stop reason: HOSPADM

## 2024-11-21 RX ORDER — SODIUM CHLORIDE 450 MG/100ML
75 INJECTION, SOLUTION INTRAVENOUS CONTINUOUS
Status: ACTIVE | OUTPATIENT
Start: 2024-11-21 | End: 2024-11-22

## 2024-11-21 RX ORDER — HYDROMORPHONE HYDROCHLORIDE 1 MG/ML
0.25 INJECTION, SOLUTION INTRAMUSCULAR; INTRAVENOUS; SUBCUTANEOUS
Status: DISCONTINUED | OUTPATIENT
Start: 2024-11-21 | End: 2024-11-21 | Stop reason: HOSPADM

## 2024-11-21 RX ORDER — HYDRALAZINE HYDROCHLORIDE 20 MG/ML
5 INJECTION INTRAMUSCULAR; INTRAVENOUS
Status: DISCONTINUED | OUTPATIENT
Start: 2024-11-21 | End: 2024-11-21 | Stop reason: HOSPADM

## 2024-11-21 RX ORDER — SODIUM CHLORIDE, SODIUM LACTATE, POTASSIUM CHLORIDE, CALCIUM CHLORIDE 600; 310; 30; 20 MG/100ML; MG/100ML; MG/100ML; MG/100ML
INJECTION, SOLUTION INTRAVENOUS CONTINUOUS PRN
Status: DISCONTINUED | OUTPATIENT
Start: 2024-11-21 | End: 2024-11-21 | Stop reason: SURG

## 2024-11-21 RX ORDER — ONDANSETRON 4 MG/1
4 TABLET, ORALLY DISINTEGRATING ORAL EVERY 6 HOURS PRN
Status: DISCONTINUED | OUTPATIENT
Start: 2024-11-21 | End: 2024-12-01 | Stop reason: HOSPADM

## 2024-11-21 RX ORDER — DIPHENHYDRAMINE HYDROCHLORIDE 50 MG/ML
12.5 INJECTION INTRAMUSCULAR; INTRAVENOUS
Status: DISCONTINUED | OUTPATIENT
Start: 2024-11-21 | End: 2024-11-21 | Stop reason: HOSPADM

## 2024-11-21 RX ORDER — PHENYLEPHRINE HYDROCHLORIDE 10 MG/ML
INJECTION INTRAVENOUS AS NEEDED
Status: DISCONTINUED | OUTPATIENT
Start: 2024-11-21 | End: 2024-11-21 | Stop reason: SURG

## 2024-11-21 RX ADMIN — LIDOCAINE HYDROCHLORIDE 40 MG: 20 INJECTION, SOLUTION INFILTRATION; PERINEURAL at 16:56

## 2024-11-21 RX ADMIN — MILNACIPRAN HYDROCHLORIDE 50 MG: 50 TABLET, FILM COATED ORAL at 08:23

## 2024-11-21 RX ADMIN — LEVETIRACETAM 500 MG: 500 TABLET, FILM COATED ORAL at 08:23

## 2024-11-21 RX ADMIN — DIGOXIN 125 MCG: 125 TABLET ORAL at 11:46

## 2024-11-21 RX ADMIN — PHENYLEPHRINE HYDROCHLORIDE 100 MCG: 10 INJECTION INTRAVENOUS at 17:29

## 2024-11-21 RX ADMIN — BACLOFEN 10 MG: 10 TABLET ORAL at 11:46

## 2024-11-21 RX ADMIN — EMPAGLIFLOZIN 10 MG: 10 TABLET, FILM COATED ORAL at 08:23

## 2024-11-21 RX ADMIN — BACLOFEN 10 MG: 10 TABLET ORAL at 21:42

## 2024-11-21 RX ADMIN — Medication 1 TABLET: at 08:23

## 2024-11-21 RX ADMIN — BUSPIRONE HYDROCHLORIDE 15 MG: 15 TABLET ORAL at 08:23

## 2024-11-21 RX ADMIN — ATORVASTATIN CALCIUM 10 MG: 20 TABLET, FILM COATED ORAL at 08:23

## 2024-11-21 RX ADMIN — BUSPIRONE HYDROCHLORIDE 15 MG: 15 TABLET ORAL at 21:42

## 2024-11-21 RX ADMIN — PROPOFOL 100 MG: 10 INJECTION, EMULSION INTRAVENOUS at 16:56

## 2024-11-21 RX ADMIN — SODIUM CHLORIDE 75 ML/HR: 4.5 INJECTION, SOLUTION INTRAVENOUS at 21:42

## 2024-11-21 RX ADMIN — TRAZODONE HYDROCHLORIDE 50 MG: 50 TABLET ORAL at 21:42

## 2024-11-21 RX ADMIN — TRAMADOL HYDROCHLORIDE 50 MG: 50 TABLET, COATED ORAL at 08:41

## 2024-11-21 RX ADMIN — SODIUM CHLORIDE 75 ML/HR: 4.5 INJECTION, SOLUTION INTRAVENOUS at 15:21

## 2024-11-21 RX ADMIN — FERROUS SULFATE TAB 325 MG (65 MG ELEMENTAL FE) 325 MG: 325 (65 FE) TAB at 08:23

## 2024-11-21 RX ADMIN — BUPROPION HYDROCHLORIDE 150 MG: 150 TABLET, EXTENDED RELEASE ORAL at 08:24

## 2024-11-21 RX ADMIN — INSULIN GLARGINE 20 UNITS: 100 INJECTION, SOLUTION SUBCUTANEOUS at 21:42

## 2024-11-21 RX ADMIN — PHENYLEPHRINE HYDROCHLORIDE 100 MCG: 10 INJECTION INTRAVENOUS at 17:42

## 2024-11-21 RX ADMIN — SODIUM CHLORIDE, POTASSIUM CHLORIDE, SODIUM LACTATE AND CALCIUM CHLORIDE: 600; 310; 30; 20 INJECTION, SOLUTION INTRAVENOUS at 16:46

## 2024-11-21 RX ADMIN — QUETIAPINE FUMARATE 100 MG: 50 TABLET ORAL at 21:42

## 2024-11-21 RX ADMIN — ASPIRIN 81 MG: 81 TABLET, COATED ORAL at 08:23

## 2024-11-21 RX ADMIN — SUGAMMADEX 200 MG: 100 INJECTION, SOLUTION INTRAVENOUS at 18:22

## 2024-11-21 RX ADMIN — PHENYLEPHRINE HYDROCHLORIDE 100 MCG: 10 INJECTION INTRAVENOUS at 17:35

## 2024-11-21 RX ADMIN — Medication 10 ML: at 21:42

## 2024-11-21 RX ADMIN — PROPOFOL 160 MCG/KG/MIN: 10 INJECTION, EMULSION INTRAVENOUS at 17:05

## 2024-11-21 RX ADMIN — INSULIN LISPRO 2 UNITS: 100 INJECTION, SOLUTION INTRAVENOUS; SUBCUTANEOUS at 11:46

## 2024-11-21 RX ADMIN — LEVETIRACETAM 500 MG: 500 TABLET, FILM COATED ORAL at 21:42

## 2024-11-21 RX ADMIN — CILOSTAZOL 100 MG: 100 TABLET ORAL at 08:24

## 2024-11-21 RX ADMIN — Medication 10 ML: at 08:23

## 2024-11-21 RX ADMIN — PHENYLEPHRINE HYDROCHLORIDE 200 MCG: 10 INJECTION INTRAVENOUS at 17:50

## 2024-11-21 RX ADMIN — ROCURONIUM BROMIDE 50 MG: 10 INJECTION, SOLUTION INTRAVENOUS at 16:56

## 2024-11-21 NOTE — NURSING NOTE
11/21/24 1510   Wound 01/18/22 sacral spine   Placement Date: 01/18/22   Location: sacral spine  Primary Wound Type: Pressure Injury  Present on Original Admission: Yes  Additional Comments: revised LDA   Wound Image    Dressing Appearance intact;dressing loose  (sacral optifoam)   Base moist;red;bleeding  (scant bleeding)   Periwound intact;dry   Wound Length (cm) 0.5 cm   Wound Width (cm) 0.5 cm   Wound Depth (cm) 0.5 cm   Wound Surface Area (cm^2) 0.25 cm^2   Wound Volume (cm^3) 0.125 cm^3   Drainage Amount scant   Care, Wound cleansed with;sterile normal saline   Dressing Care dressing changed  (applied 4x4 optiofoam (folded and tucked into the gluteal cleft to assure contact with skin base))   Skin Interventions   Pressure Reduction Devices   (needs to be transferred to Pro+ specialty bed)     Wound/ostomy - consult received regarding a wound to the sacrococcygeal region that was POA. This area is first documented in 2022 at [facility], on 1/19/22 the wound was 1x0.8x1.2 cm. Wound today is about half that size, is stable, not appearing acutely problematic. Patient lives in a LTC facility, was transferred to [facility] from [facility] for pancreatic related issues, not related to this wound.     The wound sits in deep gluteal cleft, patient morbidly obese and B AKA. Wound appears red and moist, scant bleeding to periwound from the wound bed and with probing of q tip. Recommend to place opticel AG into the wound space to keep filled and absorb kobe drainage, place an optifoam by folding and tucking in the gluteal fold assuring foam aspect of the dressing makes contact with the skin surface to adequately keep opticel placed in the wound bed and wick any drainage away. [facility] paper reviewed and I didn't see any wound care orders on them to know what the facility does to manage.     A new LDA was made with this encounter (but used the date of the initial LDA) because the documentation spaces on the flowsheet with  old LDA was missing some key components such as depth of wound.     Spoke to patient's aid that plan is to place patient on specialty bed when she comes back from procedure.     Unable to place orders at this time due to patient being in endo:   Cleanse with NS - using q tip placed a piece of opticel into the wound space - apply a 4x4 or 6x6 optifoam by folding and adequately placing in the gluteal cleft assuring the foam portion of the dressing makes contact with skin; change every other day

## 2024-11-21 NOTE — PLAN OF CARE
Goal Outcome Evaluation:           Progress: improving  Outcome Evaluation: Patient alert and oriented, on 2L nasal cannula, tramadol given once for voiced pain, NPO for ERCP, bilateral BKA specialty bed ordered for pressure redistribution, wound care saw patient regarding pressure injury present of admission, rick cath to bedside drainage, vitals stable, plan of care continued

## 2024-11-21 NOTE — H&P
GI CONSULT  NOTE:    Referring Provider:    Sasha Bui MD Milesko, Steven, DO    Chief complaint: Bile duct obstruction    Subjective .       Pre op diagnosis  Pancreatic mass  Elevated bilirubin  Obstructive jaundice  Elevated liver enzymes      History of present illness:      Chantal Kumar is a 74 y.o. female who presents today for Procedure(s):  ENDOSCOPIC RETROGRADE CHOLANGIOPANCREATOGRAPHY for the indications listed below.     The updated Patient Profile was reviewed prior to the procedure, in conjunction with the Physical Exam, including medical conditions, surgical procedures, medications, allergies, family history and social history.     Pre-operatively, I reviewed the indication(s) for the procedure, the risks of the procedure [including but not limited to: A 7 to 10% risk of post ERCP pancreatitis, unexpected bleeding possibly requiring hospitalization and/or unplanned repeat procedures, perforation possibly requiring surgical treatment, missed lesions and complications of sedation/MAC (also explained by anesthesia staff), and even death].     I have evaluated the patient for risks associated with the planned anesthesia and the procedure to be performed and find the patient an acceptable candidate for IV sedation.    Multiple opportunities were provided for any questions or concerns, and all questions were answered satisfactorily before any anesthesia was administered. We will proceed with the planned procedure.    Past Medical History:  Past Medical History:   Diagnosis Date    Arthritis     Bipolar disorder, unspecified     Borderline personality disorder     CHF (congestive heart failure)     Chronic pain     COPD (chronic obstructive pulmonary disease)     Depression     Diabetes mellitus     Dysphagia     Generalized anxiety disorder     GERD (gastroesophageal reflux disease)     Hyperlipidemia     Hypertension     Migraine     Nightmare disorder     Osteoporosis     Pressure ulcer of  unspecified site, stage 4     Seizures     Stroke     Type 2 diabetes mellitus with diabetic neuropathy, unspecified     Unspecified atrial fibrillation        Past Surgical History:  Past Surgical History:   Procedure Laterality Date    ABOVE KNEE AMPUTATION Bilateral 1/25/2022    Procedure: Bilateral above the knee amputation;  Surgeon: Herber Rodriguez MD;  Location: [facility] MAIN OR;  Service: Vascular;  Laterality: Bilateral;       Social History:  Social History     Tobacco Use    Smoking status: Never     Passive exposure: Never    Smokeless tobacco: Never   Vaping Use    Vaping status: Never Used   Substance Use Topics    Alcohol use: Never    Drug use: Never       Family History:  History reviewed. No pertinent family history.    Medications:  Medications Prior to Admission   Medication Sig Dispense Refill Last Dose/Taking    apixaban (ELIQUIS) 5 MG tablet tablet Take 1 tablet by mouth Every 12 (Twelve) Hours. Indications: Atrial Fibrillation 60 tablet 0 11/19/2024 Morning    ascorbic acid (VITAMIN C) 500 MG tablet Take 1 tablet by mouth Daily.   11/19/2024 Morning    aspirin 81 MG EC tablet Take 1 tablet by mouth Daily.   11/19/2024 Morning    atorvastatin (LIPITOR) 10 MG tablet Take  by mouth.   11/19/2024 Morning    atorvastatin (LIPITOR) 10 MG tablet Take 1 tablet by mouth Daily.   11/19/2024 Morning    baclofen (LIORESAL) 10 MG tablet Take 1 tablet by mouth 2 (Two) Times a Day.   11/19/2024 Morning    buPROPion XL (WELLBUTRIN XL) 150 MG 24 hr tablet Take 1 tablet by mouth Daily.   11/19/2024 Morning    busPIRone (BUSPAR) 15 MG tablet Take 1 tablet by mouth 3 (Three) Times a Day.   11/19/2024 Noon    cilostazol (PLETAL) 100 MG tablet Take 1 tablet by mouth 2 (Two) Times a Day.   11/19/2024 Morning    colestipol (COLESTID) 1 g tablet Take 1 tablet by mouth 2 (Two) Times a Day.   11/19/2024 Morning    digoxin (LANOXIN) 125 MCG tablet Take 1 tablet by mouth Daily.   11/19/2024 Morning    empagliflozin  (Jardiance) 10 MG tablet tablet Take 1 tablet by mouth Daily.   11/19/2024 Morning    Ferrous Fumarate 324 (106 Fe) MG tablet Take 324 mg by mouth Daily.   11/19/2024 Morning    furosemide (LASIX) 20 MG tablet Take 1 tablet by mouth.   11/19/2024 Morning    insulin lispro (humaLOG) 100 UNIT/ML injection Inject  under the skin into the appropriate area as directed 3 (Three) Times a Day Before Meals. Sliding scale   11/19/2024 Noon    l-methylfolate calcium (DEPLIN) 7.5 MG tablet tablet Take 1 tablet by mouth Daily.   11/19/2024 Morning    levETIRAcetam (KEPPRA) 500 MG tablet Take 1 tablet by mouth 2 (Two) Times a Day.   11/19/2024 Morning    metoprolol tartrate (LOPRESSOR) 100 MG tablet Take 1 tablet by mouth 2 (Two) Times a Day.   11/19/2024 Morning    milnacipran (SAVELLA) 50 MG tablet tablet Take 1 tablet by mouth Every 12 (Twelve) Hours.   11/19/2024 Morning    multivitamin with minerals tablet tablet Take 1 tablet by mouth Daily.   11/19/2024 Morning    potassium chloride (KLOR-CON M10) 10 MEQ CR tablet Take 2 tablets by mouth 2 (Two) Times a Day.   11/19/2024 Evening    prazosin (MINIPRESS) 2 MG capsule Take 1 capsule by mouth Every Night.   11/18/2024 Evening    tiZANidine (ZANAFLEX) 2 MG tablet Take 1 tablet by mouth 2 (Two) Times a Day.   11/19/2024 Bedtime    traMADol (ULTRAM) 50 MG tablet Take 1 tablet by mouth Every 8 (Eight) Hours As Needed for Moderate Pain.   11/19/2024 Noon    insulin glargine (LANTUS, SEMGLEE) 100 UNIT/ML injection Inject 60 Units under the skin into the appropriate area as directed Every Night.   11/18/2024 Bedtime    QUEtiapine (SEROquel) 100 MG tablet Take 1 tablet by mouth Every Night.   11/18/2024 Bedtime    traZODone (DESYREL) 50 MG tablet Take 1 tablet by mouth.   11/18/2024 Bedtime       Scheduled Meds:aspirin, 81 mg, Oral, Daily  atorvastatin, 10 mg, Oral, Daily  baclofen, 10 mg, Oral, BID  buPROPion XL, 150 mg, Oral, Daily  busPIRone, 15 mg, Oral, TID  cholestyramine light,  1 packet, Oral, Daily  cilostazol, 100 mg, Oral, BID  digoxin, 125 mcg, Oral, Daily  empagliflozin, 10 mg, Oral, Daily  ferrous sulfate, 325 mg, Oral, Daily With Breakfast  insulin glargine, 20 Units, Subcutaneous, Nightly  insulin lispro, 2-7 Units, Subcutaneous, 4x Daily AC & at Bedtime  levETIRAcetam, 500 mg, Oral, BID  metoprolol tartrate, 50 mg, Oral, BID  milnacipran, 50 mg, Oral, Q12H  multivitamin with minerals, 1 tablet, Oral, Daily  prazosin, 2 mg, Oral, Nightly  QUEtiapine, 100 mg, Oral, Nightly  sodium chloride, 10 mL, Intravenous, Q12H  traZODone, 50 mg, Oral, Nightly      Continuous Infusions:sodium chloride, 75 mL/hr, Last Rate: 75 mL/hr (11/21/24 1521)  sodium chloride, 50 mL/hr      PRN Meds:.  dextrose    dextrose    glucagon (human recombinant)    sodium chloride    sodium chloride    sodium chloride    tiZANidine    traMADol    ALLERGIES:  Patient has no known allergies.    ROS:  The following systems were reviewed and negative;  Constitution:  No fevers, chills, no unintentional weight loss  Skin: no rash, no jaundice  Eyes:  No blurry vision, no eye pain  HENT:  No change in hearing or smell  Resp:  No dyspnea or cough  CV:  No chest pain or palpitations  :  No dysuria, hematuria  Musculoskeletal:  No leg cramps or arthralgias  Neuro:  No tremor, no numbness  Psych:  No depression or confsusion    Objective     Vital Signs:   Vitals:    11/21/24 0628 11/21/24 0802 11/21/24 1144 11/21/24 1555   BP: 112/61 95/55 96/55 98/59   BP Location: Left arm Right arm Right arm Right arm   Patient Position: Lying Lying Lying Lying   Pulse: 82 79 79 83   Resp: 16 16 16 18   Temp: 99 °F (37.2 °C) 97.9 °F (36.6 °C)     TempSrc: Oral Oral     SpO2: 90% 93% 91% 91%   Weight:           Physical Exam:       General Appearance:    Awake and alert, in no acute distress   Head:    Normocephalic, without obvious abnormality, atraumatic   Throat:   No oral lesions, no thrush, oral mucosa moist   Lungs:      respirations regular, even and unlabored   Skin:   No rash, no jaundice       Results Review:  Lab Results (last 24 hours)       Procedure Component Value Units Date/Time    POC Glucose Once [[id number]]  (Abnormal) Collected: 11/21/24 1142    Specimen: Blood Updated: 11/21/24 1143     Glucose 194 mg/dL     CANDIDA AURIS PCR - Swab, Axilla Right, Axilla Left and Groin [[id number]]  (Normal) Collected: 11/20/24 2251    Specimen: Swab from Axilla Right, Axilla Left and Groin Updated: 11/21/24 1037     CANDIDA AURIS PCR Not Detected    Cancer Antigen 19-9 [[id number]]  (Abnormal) Collected: 11/21/24 0733    Specimen: Blood Updated: 11/21/24 1013     CA 19-9 904.0 U/mL     Narrative:      Results may be falsely decreased if patient taking Biotin.    Testing Method: Roche [employer]ostics Electrochemiluminescence Immunoassay(ECLIA)  Values obtained with different assay methods or kits cannot be used interchangeably.    Protime-INR [[id number]]  (Abnormal) Collected: 11/21/24 0733    Specimen: Blood from Arm, Right Updated: 11/21/24 0805     Protime 16.1 Seconds      INR 1.27    POC Glucose Once [[id number]]  (Abnormal) Collected: 11/21/24 0800    Specimen: Blood Updated: 11/21/24 0801     Glucose 180 mg/dL     POC Glucose Once [[id number]]  (Abnormal) Collected: 11/20/24 2103    Specimen: Blood Updated: 11/20/24 2104     Glucose 232 mg/dL     Procalcitonin [[id number]]  (Normal) Collected: 11/20/24 1348    Specimen: Blood Updated: 11/20/24 1825     Procalcitonin 0.12 ng/mL     Narrative:      As a Marker for Sepsis (Non-Neonates):    1. <0.5 ng/mL represents a low risk of severe sepsis and/or septic shock.  2. >2 ng/mL represents a high risk of severe sepsis and/or septic shock.    As a Marker for Lower Respiratory Tract Infections that require antibiotic therapy:    PCT on Admission    Antibiotic Therapy       6-12 Hrs later    >0.5                Strongly Recommended  >0.25 - <0.5        Recommended   0.1 - 0.25           "Discouraged              Remeasure/reassess PCT  <0.1                Strongly Discouraged     Remeasure/reassess PCT    As 28 day mortality risk marker: \"Change in Procalcitonin Result\" (>80% or <=80%) if Day 0 (or Day 1) and Day 4 values are available. Refer to http://www.[email or web address]pct[facility]cal[facility]    Change in PCT <=80%  A decrease of PCT levels below or equal to 80% defines a positive change in PCT test result representing a higher risk for 28-day all-cause mortality of patients diagnosed with severe sepsis for septic shock.    Change in PCT >80%  A decrease of PCT levels of more than 80% defines a negative change in PCT result representing a lower risk for 28-day all-cause mortality of patients diagnosed with severe sepsis or septic shock.       C-reactive Protein [[id number]]  (Abnormal) Collected: 11/20/24 1348    Specimen: Blood Updated: 11/20/24 1818     C-Reactive Protein 6.57 mg/dL             Imaging Results (Last 24 Hours)       ** No results found for the last 24 hours. **             I reviewed the patient's labs and imaging.    ASSESSMENT AND PLAN:      Principal Problem:    Bile duct obstruction       Procedure(s):  ENDOSCOPIC RETROGRADE CHOLANGIOPANCREATOGRAPHY      I discussed the patient's findings and my recommendations with the patient.    Angel Castaneda MD  11/21/24  16:23 EST                "

## 2024-11-21 NOTE — ANESTHESIA PREPROCEDURE EVALUATION
Anesthesia Evaluation                  Airway   Mallampati: III  Possible difficult intubation  Dental    (+) edentulous    Pulmonary - normal exam   (+) COPD,  Cardiovascular - normal exam    ECG reviewed  PT is on anticoagulation therapy    (+) hypertension, dysrhythmias Paroxysmal Atrial Fib, CHF , PVD, hyperlipidemia    ROS comment: Reviewed echo:    · Estimated right ventricular systolic pressure from tricuspid regurgitation is normal (<35 mmHg). Calculated right ventricular systolic pressure from tricuspid regurgitation is 20 mmHg.  · Left ventricular wall thickness is consistent with mild concentric hypertrophy.  · Left ventricular ejection fraction appears to be 51 - 55%. Left ventricular systolic function is low normal.  · Left ventricular diastolic function was indeterminate.  · Technically difficult study due to patient positioning, limited acoustic windows      Neuro/Psych  (+) seizures, CVA, headaches, psychiatric history Anxiety, Depression and Bipolar  GI/Hepatic/Renal/Endo    (+) morbid obesity, GERD, diabetes mellitus type 2 poorly controlled    Musculoskeletal     Abdominal    Substance History      OB/GYN          Other                      Anesthesia Plan    ASA 4     general       Anesthetic plan, risks, benefits, and alternatives have been provided, discussed and informed consent has been obtained with: patient.    CODE STATUS:         
Sepsis

## 2024-11-21 NOTE — OP NOTE
ENDOSCOPIC RETROGRADE CHOLANGIOPANCREATOGRAPHY Procedure Report    Patient Name:  Chantal Kumar  YOB: 1950    Date of Surgery:  11/21/2024     Pre-Op Diagnosis:  Pancreatic mass  Biliary obstruction  Elevated liver enzymes       Post-Op Diagnosis Codes:  Ampullary diverticulum  Failed cannulation of bile duct      Procedure/CPT® Codes:      Procedure(s):  ENDOSCOPIC RETROGRADE CHOLANGIOPANCREATOGRAPHY with sphincterotomy, failed cannulation of bile duct    Staff:  Surgeon(s):  Angel Castaneda MD      Anesthesia: Monitored Anesthesia Care    Description of Procedure:  A description of the procedure as well as risks, benefits and alternative methods were explained to the patient who voiced understanding and signed the corresponding consent form.Specifically risks of post-ERCP pancreatitis, bleeding, perforation, failure to canulate and adverse reaction to sedation were discussed. A physical exam was performed and vital signs were monitored throughout the procedure.    A  film was performed which was normal. With the patient in the semi-prone position, an Olympus side viewing endoscope was placed into the mouth and proceeded through the esophagus, stomach and second portion of the duodenum without difficulty. Limited views of the esophagus and stomach were normal.  There was a very large 3 cm diverticulum in the area where the ampulla should be located.  This was full of food and debris.  Approximately 20 minutes were used using a snare and rat-tooth forceps to grasp the food and pull it out of the diverticulum.  The ampulla was visualized inside of the diverticulum on the backside of a wall.  This was incredibly difficult to visualize.  With the scope positioned inside the diverticulum I could engage the ampulla with the sphincterotome.  Multiple maneuvers were used until there was a deep cannulation of the common tract.  This was cannulated and after I could do a small sphincterotomy.   The sphincterotomy was performed in the direction of what appeared to be the bile duct.  Repositioning of the cannula and wire were performed multiple times and contrast was injected in the main duct to try to highlight the bile duct opening all which were unsuccessful.  There did seem to be a pocket of contrast submucosally that was created with one of the final injections.  Once this happened, the ampulla became very edematous and cannulation would be highly unlikely to be successful.  The tome was then withdrawn back into the scope and the scope was withdrawn back to the stomach. The scope was then retroflexed and the fundus was visualized. The procedure was not difficult and there were no immediate complications.  There was no blood loss.    Impression:  1.  Limited view of the esophagus stomach and small bowel were normal  2.  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  3.  Ampulla visualized on the backside of the wall of the large diverticulum that was cannulated with a wire and sphincterotome with unsuccessful air cannulation of the common bile duct.    Recommendations:  Consult interventional radiology for PTC.  Patient will need outpatient endoscopic ultrasound with FNA and can internalize PTC stent at that time.  N.p.o. tonight  Indomethacin was given perioperatively      Angel Castaneda MD     Date: 11/21/2024    Time: 18:23 EST

## 2024-11-21 NOTE — PROGRESS NOTES
Discharge Planning Assessment  [facility]     Patient Name: Chantal Kumar  MRN: [id number]  Today's Date: 11/21/2024    Admit Date: 11/20/2024    Plan: Back to [facility] Medicaid bed and will need transportation   Discharge Needs Assessment    No documentation.                  Discharge Plan       Row Name 11/21/24 1153       Plan    Plan Back to [facility] Medicaid bed and will need transportation    Plan Comments Spoke with the patient at bedside. Verified the face sheet and she states she lives at [facility], Medicaid bed and will need transportation. Called the facility and they did verify that the pharmacy is correct in EPIC and she is from a medicaid bed and they can accept her back at discharge. Started discharge packet, updated pharmacy and will need to arrange transportation at discharge. The patient states she has a daughter/Cori Chatterjee but does not know how to reach her. She states she has a sister named Julia but she has been  so many times she does not know what her last name is. She states she was on oxygen at the facility and they check her Blood surgars there. She had a BKA and is bed bound at this time. CCP will continue to follow for any discharge needs. KATYA Dowling RN ,CCP.                  Continued Care and Services - Admitted Since 11/20/2024       Destination       Service Provider Request Status Services Address Phone Fax Patient Preferred    [facility] Pending - Request Sent -- [address] [place] [address] [phone] --                  Expected Discharge Date and Time       Expected Discharge Date Expected Discharge Time    Nov 23, 2024            Demographic Summary    No documentation.                  Functional Status    No documentation.                  Psychosocial    No documentation.                  Abuse/Neglect    No documentation.                   Legal    No documentation.                  Substance Abuse    No documentation.                  Patient Forms    No documentation.                     Vikki Dowling RN

## 2024-11-21 NOTE — ANESTHESIA PROCEDURE NOTES
Airway  Urgency: elective    Date/Time: 11/21/2024 5:01 PM  Airway not difficult    General Information and Staff    Patient location during procedure: OR  Anesthesiologist: Cathy Rincon MD    Indications and Patient Condition  Indications for airway management: airway protection    Preoxygenated: yes  Mask difficulty assessment: 1 - vent by mask    Final Airway Details  Final airway type: endotracheal airway      Successful airway: ETT  Cuffed: yes   Successful intubation technique: direct laryngoscopy  Endotracheal tube insertion site: oral  Blade: Ghassan  Blade size: 3  ETT size (mm): 7.0  Cormack-Lehane Classification: grade I - full view of glottis  Placement verified by: chest auscultation and capnometry   Measured from: lips  Number of attempts at approach: 1  Assessment: lips, teeth, and gum same as pre-op and atraumatic intubation

## 2024-11-21 NOTE — DISCHARGE PLACEMENT REQUEST
Chantal Kumar (74 y.o. Female)       Date of Birth   1950    Social Security Number       Address   [address] [place] [address]    Home Phone   [phone]    MRN   [id number]       Jew   Unknown    Marital Status   Single                            Admission Date   11/20/24    Admission Type   Urgent    Admitting Provider   Abel Amaya DO    Attending Provider   Jeramy Briggs MD    Department, Room/Bed   [address], [facility]/1       Discharge Date       Discharge Disposition       Discharge Destination                                 Attending Provider: Jeramy Briggs MD    Allergies: No Known Allergies    Isolation: Contact   Infection: Candida Auris (rule out) (11/20/24), MRSA (11/20/24), 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)   Code Status: Prior    Ht: --   Wt: 84.4 kg (186 lb)    Admission Cmt: None   Principal Problem: Bile duct obstruction [K83.1]                   Active Insurance as of 11/20/2024       Primary Coverage       Payor Plan Insurance Group Employer/Plan Group    MEDICARE MEDICARE B ONLY        Payor Plan Address Payor Plan Phone Number Payor Plan Fax Number Effective Dates    [address] [address] [phone]  5/1/2015 - None Entered    [facility] [id number]         Subscriber Name Subscriber Birth Date Member ID       CHANTAL KUMAR 1950 [id number]               Secondary Coverage       Payor Plan Insurance Group Employer/Plan Group    [place]        Payor Plan Address Payor Plan Phone Number Payor Plan Fax Number Effective Dates    [address] [phone]  8/31/2021 - None Entered    [facility] [address]         Subscriber Name Subscriber Birth Date Member ID       CHANTAL KUMAR 1950 [id number]                     Emergency Contacts        (Rel.) Home Phone Work Phone Mobile Phone    Sarah Kang (Niece) (Other) [phone] -- --

## 2024-11-21 NOTE — CONSULTS
Referring Provider: Dr Briggs      Subjective   History of present illness: Very nice 74-year-old we are asked to see for possible chronic osteomyelitis of sacrum.  Briefly, patient is nursing home resident status post bilateral AKA and longstanding sacral decubitus ulcer receiving wound care at her nursing home.  She is nonambulatory.  She says her wound has been present for some time and cannot quantify but at least greater than 1 year.  She developed diarrhea, nausea vomiting and has had weight loss of about 80 pounds prompting admission to [facility].  There she had a CT that showed biliary dilation and obstruction from pancreatic mass.  There was also mention of    Past Medical History:   Diagnosis Date    Arthritis     Bipolar disorder, unspecified     Borderline personality disorder     CHF (congestive heart failure)     Chronic pain     COPD (chronic obstructive pulmonary disease)     Depression     Diabetes mellitus     Dysphagia     Generalized anxiety disorder     GERD (gastroesophageal reflux disease)     Hyperlipidemia     Hypertension     Migraine     Nightmare disorder     Osteoporosis     Pressure ulcer of unspecified site, stage 4     Seizures     Stroke     Type 2 diabetes mellitus with diabetic neuropathy, unspecified     Unspecified atrial fibrillation        Past Surgical History:   Procedure Laterality Date    ABOVE KNEE AMPUTATION Bilateral 1/25/2022    Procedure: Bilateral above the knee amputation;  Surgeon: Herber Rodriguez MD;  Location: [facility] MAIN OR;  Service: Vascular;  Laterality: Bilateral;       No Known Allergies    Medication:  Antibiotics:  Anti-Infectives (From admission, onward)      None                Physical Exam:   Vital Signs   Temp:  [97 °F (36.1 °C)-99 °F (37.2 °C)] 97.9 °F (36.6 °C)  Heart Rate:  [] 79  Resp:  [16-18] 16  BP: ()/(55-77) 95/55    GENERAL: Awake and alert, chronically ill-appearing  HEENT: Oropharynx is clear. Hearing is  grossly normal.   EYES: . No conjunctival injection. No lid lag.   LUNGS:normal respiratory effort.   SKIN: no cutaneous eruptions in exposed areas  PSYCHIATRIC: Pleasant mood and affect but limited insight, and judgment.     Results Review:  White count 7.56  Procalcitonin 0.1  Alk phos 815, , , total bilirubin 5.2    A/p  1.  Obstructing pancreatic mass, likely malignancy  2.  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 2018 Jul 7;68(2):338-342).  There is no local infection around the wound.  I think this is probably the least of her worries.  She is going to have an ERCP today and we will follow-up the results of this.  I will hold antibiotics      Thank you for this consult.  We will continue to follow along and tailor antibiotics as the patient's clinical course evolves.

## 2024-11-21 NOTE — PLAN OF CARE
Goal Outcome Evaluation:  Plan of Care Reviewed With: patient        Progress: no change  Outcome Evaluation: Pt is A&Ox4, yuko SIMMS. Mepilex placed over sacrum. She denies pain, nausea, and vomiting. No PRNs required at this time. She is to have a procedure on 11/21 done. She has been NPO at 0000. She takes all medications whole with water, several at a time and needs them placed in her mouth. F/C draining orange urine with sediment.

## 2024-11-21 NOTE — PROGRESS NOTES
[facility]     LOS: 1 day     Subjective:    CC:No chief complaint on file.    DIET:  Diet Order   Procedures    NPO Diet NPO Type: Strict NPO       Objective:    Vital Signs:  Temp:  [97.2 °F (36.2 °C)-99 °F (37.2 °C)] 97.9 °F (36.6 °C)  Heart Rate:  [] 83  Resp:  [16-18] 18  BP: ()/(55-61) 98/59    SpO2:  [90 %-93 %] 91 %  on  Flow (L/min) (Oxygen Therapy):  [2] 2;   Device (Oxygen Therapy): nasal cannula  Body mass index is 30.02 kg/m².    Physical Exam  Constitutional:       General: She is not in acute distress.  Pulmonary:      Effort: Pulmonary effort is normal.      Breath sounds: Normal breath sounds.   Abdominal:      General: There is no distension.      Palpations: Abdomen is soft.      Tenderness: There is no abdominal tenderness.   Skin:     General: Skin is warm and dry.   Neurological:      General: No focal deficit present.      Mental Status: She is alert.   Psychiatric:         Mood and Affect: Mood normal.         Behavior: Behavior normal.         Results Review:    Glucose   Date Value Ref Range Status   11/20/2024 182 (H) 65 - 99 mg/dL Final     Results from last 7 days   Lab Units 11/20/24  1348   WBC 10*3/mm3 7.56   HEMOGLOBIN g/dL 13.7   HEMATOCRIT % 43.1   PLATELETS 10*3/mm3 178     Results from last 7 days   Lab Units 11/20/24  1348   SODIUM mmol/L 137   POTASSIUM mmol/L 4.3   CHLORIDE mmol/L 106   CO2 mmol/L 19.0*   BUN mg/dL 14   CREATININE mg/dL 0.90   CALCIUM mg/dL 8.8   BILIRUBIN mg/dL 5.2*   ALK PHOS U/L 850*   ALT (SGPT) U/L 157*   AST (SGOT) U/L 111*   GLUCOSE mg/dL 182*     Results from last 7 days   Lab Units 11/21/24  0733   INR  1.27*             Cultures:  No results found for: \"BLOODCX\", \"URINECX\", \"WOUNDCX\", \"MRSACX\", \"RESPCX\", \"STOOLCX\"    I have reviewed daily medications and changes in CPOE    Scheduled meds  aspirin, 81 mg, Oral, Daily  atorvastatin, 10 mg, Oral, Daily  baclofen, 10 mg, Oral, BID  buPROPion XL, 150 mg, Oral, " well developed, well nourished , in no acute distress , ambulating without difficulty , normal communication ability Daily  busPIRone, 15 mg, Oral, TID  cholestyramine light, 1 packet, Oral, Daily  cilostazol, 100 mg, Oral, BID  digoxin, 125 mcg, Oral, Daily  empagliflozin, 10 mg, Oral, Daily  ferrous sulfate, 325 mg, Oral, Daily With Breakfast  insulin glargine, 20 Units, Subcutaneous, Nightly  insulin lispro, 2-7 Units, Subcutaneous, 4x Daily AC & at Bedtime  levETIRAcetam, 500 mg, Oral, BID  metoprolol tartrate, 50 mg, Oral, BID  milnacipran, 50 mg, Oral, Q12H  multivitamin with minerals, 1 tablet, Oral, Daily  prazosin, 2 mg, Oral, Nightly  QUEtiapine, 100 mg, Oral, Nightly  sodium chloride, 10 mL, Intravenous, Q12H  traZODone, 50 mg, Oral, Nightly        sodium chloride, 75 mL/hr, Last Rate: 75 mL/hr (11/21/24 1521)  sodium chloride, 50 mL/hr      PRN meds    dextrose    dextrose    diphenhydrAMINE    droperidol **OR** droperidol    ePHEDrine    flumazenil    glucagon (human recombinant)    glucagon    hydrALAZINE    HYDROcodone-acetaminophen    HYDROcodone-acetaminophen    HYDROmorphone    Indomethacin    ipratropium-albuterol    labetalol    naloxone    promethazine **OR** promethazine    sodium chloride    sodium chloride    sodium chloride    tiZANidine    traMADol        Bile duct obstruction        Assessment/Plan:    Bile duct stone with jaundice and elevated bilirubin 3.8, concern for malignancy  -ERCP at 4pm  -d/w Gastroenterology yesterday  -clears until 1 pm today     Concern for osteomyelitis around decubitus ulcer  -procal nl, CRP elevated  -Hold on antibiotics  -Pictures of wound in chart, the wound does not appear infected by exam  -infectious disease consult and no antibiotics for now, f/u outpatient     Atrial fibrillation for which she is on Eliquis  -We will hold on Eliquis for now and restart when ok with GI     History of pressure ulcer and status post bilateral above-the-knee amputation  -Likely from severe peripheral vascular disease for which she is on aspirin and Pletal     Seizure history  -Continue  with Elvin Briggs MD  11/21/24  18:17 EST

## 2024-11-22 ENCOUNTER — APPOINTMENT (OUTPATIENT)
Dept: CT IMAGING | Facility: HOSPITAL | Age: 74
DRG: 435 | End: 2024-11-22
Payer: MEDICARE

## 2024-11-22 ENCOUNTER — APPOINTMENT (OUTPATIENT)
Dept: INTERVENTIONAL RADIOLOGY/VASCULAR | Facility: HOSPITAL | Age: 74
DRG: 435 | End: 2024-11-22
Payer: MEDICARE

## 2024-11-22 LAB
ALBUMIN SERPL-MCNC: 2.3 G/DL (ref 3.5–5.2)
ALBUMIN/GLOB SERPL: 0.6 G/DL
ALP SERPL-CCNC: 859 U/L (ref 39–117)
ALT SERPL W P-5'-P-CCNC: 102 U/L (ref 1–33)
ANION GAP SERPL CALCULATED.3IONS-SCNC: 9 MMOL/L (ref 5–15)
AST SERPL-CCNC: 72 U/L (ref 1–32)
BASOPHILS # BLD AUTO: 0.03 10*3/MM3 (ref 0–0.2)
BASOPHILS NFR BLD AUTO: 0.4 % (ref 0–1.5)
BILIRUB SERPL-MCNC: 3.4 MG/DL (ref 0–1.2)
BUN SERPL-MCNC: 17 MG/DL (ref 8–23)
BUN/CREAT SERPL: 18.5 (ref 7–25)
CALCIUM SPEC-SCNC: 8.4 MG/DL (ref 8.6–10.5)
CHLORIDE SERPL-SCNC: 109 MMOL/L (ref 98–107)
CO2 SERPL-SCNC: 19 MMOL/L (ref 22–29)
CREAT SERPL-MCNC: 0.92 MG/DL (ref 0.57–1)
DEPRECATED RDW RBC AUTO: 44.4 FL (ref 37–54)
EGFRCR SERPLBLD CKD-EPI 2021: 65.5 ML/MIN/1.73
EOSINOPHIL # BLD AUTO: 0.06 10*3/MM3 (ref 0–0.4)
EOSINOPHIL NFR BLD AUTO: 0.9 % (ref 0.3–6.2)
ERYTHROCYTE [DISTWIDTH] IN BLOOD BY AUTOMATED COUNT: 14.4 % (ref 12.3–15.4)
GLOBULIN UR ELPH-MCNC: 3.6 GM/DL
GLUCOSE BLDC GLUCOMTR-MCNC: 101 MG/DL (ref 70–130)
GLUCOSE BLDC GLUCOMTR-MCNC: 107 MG/DL (ref 70–130)
GLUCOSE BLDC GLUCOMTR-MCNC: 134 MG/DL (ref 70–130)
GLUCOSE BLDC GLUCOMTR-MCNC: 136 MG/DL (ref 70–130)
GLUCOSE SERPL-MCNC: 114 MG/DL (ref 65–99)
HCT VFR BLD AUTO: 38.4 % (ref 34–46.6)
HGB BLD-MCNC: 12 G/DL (ref 12–15.9)
IMM GRANULOCYTES # BLD AUTO: 0.02 10*3/MM3 (ref 0–0.05)
IMM GRANULOCYTES NFR BLD AUTO: 0.3 % (ref 0–0.5)
LYMPHOCYTES # BLD AUTO: 0.8 10*3/MM3 (ref 0.7–3.1)
LYMPHOCYTES NFR BLD AUTO: 11.8 % (ref 19.6–45.3)
MAGNESIUM SERPL-MCNC: 2.4 MG/DL (ref 1.6–2.4)
MCH RBC QN AUTO: 26.7 PG (ref 26.6–33)
MCHC RBC AUTO-ENTMCNC: 31.3 G/DL (ref 31.5–35.7)
MCV RBC AUTO: 85.5 FL (ref 79–97)
MONOCYTES # BLD AUTO: 0.36 10*3/MM3 (ref 0.1–0.9)
MONOCYTES NFR BLD AUTO: 5.3 % (ref 5–12)
NEUTROPHILS NFR BLD AUTO: 5.51 10*3/MM3 (ref 1.7–7)
NEUTROPHILS NFR BLD AUTO: 81.3 % (ref 42.7–76)
NRBC BLD AUTO-RTO: 0 /100 WBC (ref 0–0.2)
PLATELET # BLD AUTO: 156 10*3/MM3 (ref 140–450)
PMV BLD AUTO: 10 FL (ref 6–12)
POTASSIUM SERPL-SCNC: 3.2 MMOL/L (ref 3.5–5.2)
POTASSIUM SERPL-SCNC: 3.9 MMOL/L (ref 3.5–5.2)
PROT SERPL-MCNC: 5.9 G/DL (ref 6–8.5)
RBC # BLD AUTO: 4.49 10*6/MM3 (ref 3.77–5.28)
SODIUM SERPL-SCNC: 137 MMOL/L (ref 136–145)
WBC NRBC COR # BLD AUTO: 6.78 10*3/MM3 (ref 3.4–10.8)

## 2024-11-22 PROCEDURE — 85025 COMPLETE CBC W/AUTO DIFF WBC: CPT | Performed by: HOSPITALIST

## 2024-11-22 PROCEDURE — 25810000003 LACTATED RINGERS PER 1000 ML: Performed by: HOSPITALIST

## 2024-11-22 PROCEDURE — 82948 REAGENT STRIP/BLOOD GLUCOSE: CPT

## 2024-11-22 PROCEDURE — 80053 COMPREHEN METABOLIC PANEL: CPT | Performed by: HOSPITALIST

## 2024-11-22 PROCEDURE — 25010000002 POTASSIUM CHLORIDE 10 MEQ/100ML SOLUTION: Performed by: HOSPITALIST

## 2024-11-22 PROCEDURE — 63710000001 INSULIN GLARGINE PER 5 UNITS: Performed by: INTERNAL MEDICINE

## 2024-11-22 PROCEDURE — 83735 ASSAY OF MAGNESIUM: CPT | Performed by: HOSPITALIST

## 2024-11-22 PROCEDURE — 84132 ASSAY OF SERUM POTASSIUM: CPT | Performed by: HOSPITALIST

## 2024-11-22 PROCEDURE — 25510000002 DIATRIZOATE MEGLUMINE & SODIUM PER 1 ML: Performed by: INTERNAL MEDICINE

## 2024-11-22 PROCEDURE — 25510000001 IOPAMIDOL 61 % SOLUTION: Performed by: HOSPITALIST

## 2024-11-22 PROCEDURE — 99232 SBSQ HOSP IP/OBS MODERATE 35: CPT | Performed by: NURSE PRACTITIONER

## 2024-11-22 PROCEDURE — 99232 SBSQ HOSP IP/OBS MODERATE 35: CPT | Performed by: INTERNAL MEDICINE

## 2024-11-22 PROCEDURE — 74170 CT ABD WO CNTRST FLWD CNTRST: CPT

## 2024-11-22 RX ORDER — SODIUM CHLORIDE, SODIUM LACTATE, POTASSIUM CHLORIDE, CALCIUM CHLORIDE 600; 310; 30; 20 MG/100ML; MG/100ML; MG/100ML; MG/100ML
75 INJECTION, SOLUTION INTRAVENOUS CONTINUOUS
Status: ACTIVE | OUTPATIENT
Start: 2024-11-22 | End: 2024-11-23

## 2024-11-22 RX ORDER — DIATRIZOATE MEGLUMINE AND DIATRIZOATE SODIUM 660; 100 MG/ML; MG/ML
30 SOLUTION ORAL; RECTAL ONCE
Status: COMPLETED | OUTPATIENT
Start: 2024-11-22 | End: 2024-11-22

## 2024-11-22 RX ORDER — POTASSIUM CHLORIDE 7.45 MG/ML
10 INJECTION INTRAVENOUS
Status: COMPLETED | OUTPATIENT
Start: 2024-11-22 | End: 2024-11-22

## 2024-11-22 RX ORDER — IOPAMIDOL 612 MG/ML
100 INJECTION, SOLUTION INTRAVASCULAR
Status: COMPLETED | OUTPATIENT
Start: 2024-11-22 | End: 2024-11-22

## 2024-11-22 RX ADMIN — POTASSIUM CHLORIDE 10 MEQ: 7.46 INJECTION, SOLUTION INTRAVENOUS at 08:55

## 2024-11-22 RX ADMIN — BUPROPION HYDROCHLORIDE 150 MG: 150 TABLET, EXTENDED RELEASE ORAL at 11:48

## 2024-11-22 RX ADMIN — IOPAMIDOL 85 ML: 612 INJECTION, SOLUTION INTRAVENOUS at 19:43

## 2024-11-22 RX ADMIN — DIATRIZOATE MEGLUMINE AND DIATRIZOATE SODIUM 30 ML: 600; 100 SOLUTION ORAL; RECTAL at 17:17

## 2024-11-22 RX ADMIN — MILNACIPRAN HYDROCHLORIDE 50 MG: 50 TABLET, FILM COATED ORAL at 20:29

## 2024-11-22 RX ADMIN — BUSPIRONE HYDROCHLORIDE 15 MG: 15 TABLET ORAL at 17:17

## 2024-11-22 RX ADMIN — Medication 10 ML: at 11:51

## 2024-11-22 RX ADMIN — BACLOFEN 10 MG: 10 TABLET ORAL at 20:29

## 2024-11-22 RX ADMIN — Medication 10 ML: at 20:30

## 2024-11-22 RX ADMIN — ATORVASTATIN CALCIUM 10 MG: 20 TABLET, FILM COATED ORAL at 11:47

## 2024-11-22 RX ADMIN — POTASSIUM CHLORIDE 10 MEQ: 7.46 INJECTION, SOLUTION INTRAVENOUS at 10:25

## 2024-11-22 RX ADMIN — LEVETIRACETAM 500 MG: 500 TABLET, FILM COATED ORAL at 20:29

## 2024-11-22 RX ADMIN — INSULIN GLARGINE 20 UNITS: 100 INJECTION, SOLUTION SUBCUTANEOUS at 20:29

## 2024-11-22 RX ADMIN — BUSPIRONE HYDROCHLORIDE 15 MG: 15 TABLET ORAL at 11:48

## 2024-11-22 RX ADMIN — TRAZODONE HYDROCHLORIDE 50 MG: 50 TABLET ORAL at 20:29

## 2024-11-22 RX ADMIN — PRAZOSIN HYDROCHLORIDE 2 MG: 2 CAPSULE ORAL at 20:29

## 2024-11-22 RX ADMIN — ASPIRIN 81 MG: 81 TABLET, COATED ORAL at 11:48

## 2024-11-22 RX ADMIN — EMPAGLIFLOZIN 10 MG: 10 TABLET, FILM COATED ORAL at 11:49

## 2024-11-22 RX ADMIN — FERROUS SULFATE TAB 325 MG (65 MG ELEMENTAL FE) 325 MG: 325 (65 FE) TAB at 11:47

## 2024-11-22 RX ADMIN — CHOLESTYRAMINE 4 G: 4 POWDER, FOR SUSPENSION ORAL at 14:04

## 2024-11-22 RX ADMIN — METOPROLOL TARTRATE 50 MG: 50 TABLET, FILM COATED ORAL at 20:29

## 2024-11-22 RX ADMIN — MILNACIPRAN HYDROCHLORIDE 50 MG: 50 TABLET, FILM COATED ORAL at 11:48

## 2024-11-22 RX ADMIN — BACLOFEN 10 MG: 10 TABLET ORAL at 11:47

## 2024-11-22 RX ADMIN — CILOSTAZOL 100 MG: 100 TABLET ORAL at 11:49

## 2024-11-22 RX ADMIN — LEVETIRACETAM 500 MG: 500 TABLET, FILM COATED ORAL at 08:48

## 2024-11-22 RX ADMIN — CILOSTAZOL 100 MG: 100 TABLET ORAL at 20:29

## 2024-11-22 RX ADMIN — BUSPIRONE HYDROCHLORIDE 15 MG: 15 TABLET ORAL at 20:29

## 2024-11-22 RX ADMIN — SODIUM CHLORIDE, SODIUM LACTATE, POTASSIUM CHLORIDE, CALCIUM CHLORIDE 75 ML/HR: 20; 30; 600; 310 INJECTION, SOLUTION INTRAVENOUS at 15:29

## 2024-11-22 RX ADMIN — QUETIAPINE FUMARATE 100 MG: 50 TABLET ORAL at 20:29

## 2024-11-22 RX ADMIN — Medication 1 TABLET: at 11:46

## 2024-11-22 RX ADMIN — DIGOXIN 125 MCG: 125 TABLET ORAL at 14:04

## 2024-11-22 NOTE — CONSULTS
I was requested to assist patient in completing her Advance Directive.  AD is complete and placed in chart.

## 2024-11-22 NOTE — PROGRESS NOTES
[facility]  Inpatient Progress Note    Reason for Follow Up: Biliary obstruction from pancreatic mass    Subjective     Interval History:     Patient resting comfortably in bed.  Remains jaundiced.  Cites some mild upper abdominal pain.  No nausea or vomiting.  She is currently NPO.    Patient underwent ERCP yesterday with Dr. 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  Plans for IR placement of PTC today.  Ultimately will need outpatient endoscopic ultrasound with FNA and internalization of PTC stent at that time.    I spoke with the interventional radiologist.  Plans for updated imaging of the biliary tree today before moving forward with PTC.  As such I have ordered a CT of the abdomen with and without contrast four-phase liver protocol. Reviewed plan of care with patient.  All questions were answered.      Current Facility-Administered Medications:     aspirin EC tablet 81 mg, 81 mg, Oral, Daily, Angel Castaneda MD, 81 mg at 11/21/24 0823    atorvastatin (LIPITOR) tablet 10 mg, 10 mg, Oral, Daily, Angel Castaneda MD, 10 mg at 11/21/24 0823    baclofen (LIORESAL) tablet 10 mg, 10 mg, Oral, BID, Angel Castaneda MD, 10 mg at 11/21/24 2142    buPROPion XL (WELLBUTRIN XL) 24 hr tablet 150 mg, 150 mg, Oral, Daily, Angel Castaneda MD, 150 mg at 11/21/24 0824    busPIRone (BUSPAR) tablet 15 mg, 15 mg, Oral, TID, Angel Castaneda MD, 15 mg at 11/21/24 2142    cholestyramine light packet 4 g, 1 packet, Oral, Daily, Angel Castaneda MD    cilostazol (PLETAL) tablet 100 mg, 100 mg, Oral, BID, Angel Castaneda MD, 100 mg at 11/21/24 0824    dextrose (D50W) (25 g/50 mL) IV injection 25 g, 25 g, Intravenous, Q15 Min PRN, Angel Castaneda MD    dextrose (GLUTOSE) oral gel 15 g, 15 g, Oral, Q15 Min PRN, Angel Castaneda MD    digoxin (LANOXIN) tablet 125 mcg, 125 mcg, Oral, Daily, Angel Castaneda MD, 125 mcg at 11/21/24 1146    empagliflozin  (JARDIANCE) tablet 10 mg, 10 mg, Oral, Daily, Angel Castaneda MD, 10 mg at 11/21/24 0823    ferrous sulfate tablet 325 mg, 325 mg, Oral, Daily With Breakfast, Angel Castaneda MD, 325 mg at 11/21/24 0823    glucagon (GLUCAGEN) injection 1 mg, 1 mg, Intramuscular, Q15 Min PRN, Angel Castaneda MD    insulin glargine (LANTUS, SEMGLEE) injection 20 Units, 20 Units, Subcutaneous, Nightly, Angel Castaneda MD, 20 Units at 11/21/24 2142    insulin lispro (HUMALOG/ADMELOG) injection 2-7 Units, 2-7 Units, Subcutaneous, 4x Daily AC & at Bedtime, Angel Castaneda MD, 2 Units at 11/21/24 1146    lactated ringers infusion, 75 mL/hr, Intravenous, Continuous, Jeramy Briggs MD    levETIRAcetam (KEPPRA) tablet 500 mg, 500 mg, Oral, BID, Angel Castaneda MD, 500 mg at 11/21/24 2142    metoprolol tartrate (LOPRESSOR) tablet 50 mg, 50 mg, Oral, BID, Angel Castaneda MD, 50 mg at 11/20/24 2350    milnacipran (SAVELLA) tablet 50 mg, 50 mg, Oral, Q12H, Angel Castaneda MD, 50 mg at 11/21/24 0823    multivitamin with minerals 1 tablet, 1 tablet, Oral, Daily, Angel Castaneda MD, 1 tablet at 11/21/24 0823    ondansetron ODT (ZOFRAN-ODT) disintegrating tablet 4 mg, 4 mg, Oral, Q6H PRN **OR** ondansetron (ZOFRAN) injection 4 mg, 4 mg, Intravenous, Q6H PRN, Angel Castaneda MD    Potassium Replacement - Follow Nurse / BPA Driven Protocol, , Not Applicable, PRN, Jeramy Briggs MD    prazosin (MINIPRESS) capsule 2 mg, 2 mg, Oral, Nightly, Angel Castaneda MD, 2 mg at 11/20/24 2240    QUEtiapine (SEROquel) tablet 100 mg, 100 mg, Oral, Nightly, Angel Castaneda MD, 100 mg at 11/21/24 2142    sodium chloride 0.45 % infusion, 75 mL/hr, Intravenous, Continuous, Jeramy Briggs MD, Last Rate: 75 mL/hr at 11/21/24 2142, 75 mL/hr at 11/21/24 2142    sodium chloride 0.9 % flush 10 mL, 10 mL, Intravenous, Q12H, Angel Castaneda MD, 10 mL at 11/21/24 2142    sodium chloride 0.9 % flush 10 mL, 10 mL, Intravenous, PRN, Angel Castaneda MD     "sodium chloride 0.9 % infusion 40 mL, 40 mL, Intravenous, PRN, Angel Castaneda MD    tiZANidine (ZANAFLEX) tablet 2 mg, 2 mg, Oral, Q8H PRN, Angel Castaneda MD    traMADol (ULTRAM) tablet 50 mg, 50 mg, Oral, Q8H PRN, Angel Castaneda MD, 50 mg at 11/21/24 0841    traZODone (DESYREL) tablet 50 mg, 50 mg, Oral, Nightly, Angel Castaneda MD, 50 mg at 11/21/24 2142  Review of Systems:    All systems were reviewed and negative except for:  Gastrointestinal: positive for  pain and jaundice    Objective     Vital Signs  Temp:  [96.1 °F (35.6 °C)-97.7 °F (36.5 °C)] 97.1 °F (36.2 °C)  Heart Rate:  [73-94] 73  Resp:  [16-18] 16  BP: ()/(49-91) 90/49  Body mass index is 30.02 kg/m².    Intake/Output Summary (Last 24 hours) at 11/22/2024 0836  Last data filed at 11/22/2024 0644  Gross per 24 hour   Intake 300 ml   Output 1700 ml   Net -1400 ml     No intake/output data recorded.     Physical Exam:   General: patient awake, alert and cooperative   Abdomen: soft, nontender, nondistended; normal bowel sounds      Results Review:     I reviewed the patient's new clinical results.    Results from last 7 days   Lab Units 11/22/24  0742 11/20/24  1348   WBC 10*3/mm3 6.78 7.56   HEMOGLOBIN g/dL 12.0 13.7   HEMATOCRIT % 38.4 43.1   PLATELETS 10*3/mm3 156 178     Results from last 7 days   Lab Units 11/22/24  0742 11/20/24  1348   SODIUM mmol/L 137 137   POTASSIUM mmol/L 3.2* 4.3   CHLORIDE mmol/L 109* 106   CO2 mmol/L 19.0* 19.0*   BUN mg/dL 17 14   CREATININE mg/dL 0.92 0.90   CALCIUM mg/dL 8.4* 8.8   BILIRUBIN mg/dL 3.4* 5.2*   ALK PHOS U/L 859* 850*   ALT (SGPT) U/L 102* 157*   AST (SGOT) U/L 72* 111*   GLUCOSE mg/dL 114* 182*     Results from last 7 days   Lab Units 11/21/24  0733   INR  1.27*     No results found for: \"LIPASE\"    Radiology:  FL ercp biliary duct only   Final Result      IR Biliary Drain Internal-external    (Results Pending)       Assessment & Plan     Active Hospital Problems    Diagnosis     **Bile duct " obstruction        Assessment:  Biliary obstruction  Jaundice  Mild abdominal pain  Elevated liver function test and hyperbilirubinemia secondary to #1  Elevated CA 19-9  Possible pancreatic mass on outside imaging  Atrial fib Eliquis on hold    Plan:  CT with and without contrast four-phase liver protocol today  IR placement of PTC pending imaging  Eliquis on hold  Continue n.p.o. status  She can have meds with small sips of water  Plans for outpatient endoscopic ultrasound with FNA and internalization of PTC stent at that time.  CBC, CMP and PT INR in the am    I discussed the patients findings and my recommendations with patient and Dr. Castaneda .    Lizzeth Turner, APRN

## 2024-11-22 NOTE — PROGRESS NOTES
[facility]     LOS: 2 days     Subjective:    CC:No chief complaint on file.    DIET:  Diet Order   Procedures    NPO Diet NPO Type: Sips with Meds, Ice Chips       Objective:    Vital Signs:  Temp:  [96.1 °F (35.6 °C)-97.7 °F (36.5 °C)] 97 °F (36.1 °C)  Heart Rate:  [73-94] 84  Resp:  [16-18] 16  BP: ()/(49-91) 92/56    SpO2:  [94 %-99 %] 94 %  on  Flow (L/min) (Oxygen Therapy):  [3] 3;   Device (Oxygen Therapy): nasal cannula  Body mass index is 30.02 kg/m².    Physical Exam  Constitutional:       General: She is not in acute distress.  Cardiovascular:      Rate and Rhythm: Normal rate and regular rhythm.   Pulmonary:      Effort: Pulmonary effort is normal.      Breath sounds: Normal breath sounds.   Abdominal:      General: There is no distension.      Palpations: Abdomen is soft.      Tenderness: There is no abdominal tenderness.   Skin:     General: Skin is warm and dry.   Neurological:      General: No focal deficit present.      Mental Status: She is alert.   Psychiatric:         Mood and Affect: Mood normal.         Behavior: Behavior normal.         Results Review:    Glucose   Date Value Ref Range Status   11/22/2024 114 (H) 65 - 99 mg/dL Final   11/20/2024 182 (H) 65 - 99 mg/dL Final     Results from last 7 days   Lab Units 11/22/24  0742   WBC 10*3/mm3 6.78   HEMOGLOBIN g/dL 12.0   HEMATOCRIT % 38.4   PLATELETS 10*3/mm3 156     Results from last 7 days   Lab Units 11/22/24  1440 11/22/24  0742   SODIUM mmol/L  --  137   POTASSIUM mmol/L 3.9 3.2*   CHLORIDE mmol/L  --  109*   CO2 mmol/L  --  19.0*   BUN mg/dL  --  17   CREATININE mg/dL  --  0.92   CALCIUM mg/dL  --  8.4*   BILIRUBIN mg/dL  --  3.4*   ALK PHOS U/L  --  859*   ALT (SGPT) U/L  --  102*   AST (SGOT) U/L  --  72*   GLUCOSE mg/dL  --  114*     Results from last 7 days   Lab Units 11/21/24  0733   INR  1.27*     Results from last 7 days   Lab Units 11/22/24  0742   MAGNESIUM mg/dL 2.4         Cultures:  No  "results found for: \"BLOODCX\", \"URINECX\", \"WOUNDCX\", \"MRSACX\", \"RESPCX\", \"STOOLCX\"    I have reviewed daily medications and changes in CPOE    Scheduled meds  aspirin, 81 mg, Oral, Daily  atorvastatin, 10 mg, Oral, Daily  baclofen, 10 mg, Oral, BID  buPROPion XL, 150 mg, Oral, Daily  busPIRone, 15 mg, Oral, TID  cholestyramine light, 1 packet, Oral, Daily  cilostazol, 100 mg, Oral, BID  digoxin, 125 mcg, Oral, Daily  empagliflozin, 10 mg, Oral, Daily  ferrous sulfate, 325 mg, Oral, Daily With Breakfast  insulin glargine, 20 Units, Subcutaneous, Nightly  insulin lispro, 2-7 Units, Subcutaneous, 4x Daily AC & at Bedtime  levETIRAcetam, 500 mg, Oral, BID  metoprolol tartrate, 50 mg, Oral, BID  milnacipran, 50 mg, Oral, Q12H  multivitamin with minerals, 1 tablet, Oral, Daily  prazosin, 2 mg, Oral, Nightly  QUEtiapine, 100 mg, Oral, Nightly  sodium chloride, 10 mL, Intravenous, Q12H  traZODone, 50 mg, Oral, Nightly        lactated ringers, 75 mL/hr, Last Rate: 75 mL/hr (11/22/24 1529)      PRN meds    dextrose    dextrose    glucagon (human recombinant)    ondansetron ODT **OR** ondansetron    Potassium Replacement - Follow Nurse / BPA Driven Protocol    sodium chloride    sodium chloride    tiZANidine    traMADol        Bile duct obstruction        Assessment/Plan:    Bile duct stone with jaundice and elevated bilirubin 3.8  -ERCP completed  -NPO today  -IV fluids  -possible drain today     Concern for osteomyelitis around decubitus ulcer  -procal nl, CRP elevated  -Hold on antibiotics  -Pictures of wound in chart, the wound does not appear infected by exam  -infectious disease consult and no antibiotics for now, f/u outpatient     Atrial fibrillation for which she is on Eliquis  -We will hold on Eliquis for now and restart when ok with GI     History of pressure ulcer and status post bilateral above-the-knee amputation  -Likely from severe peripheral vascular disease for which she is on aspirin and Pletal     Seizure " history  -Continue with Elvin Briggs MD  11/22/24  16:00 EST

## 2024-11-22 NOTE — PLAN OF CARE
Goal Outcome Evaluation:  Plan of Care Reviewed With: patient        Progress: improving  Outcome Evaluation: All needs met. Turns as tolerated. NPO diet continued per orders. Peterson care provided. BM tonight. VSS. Oriented x 4. [place]. Remains on 3L NC. No complaints. IVF started. Blood glucose monitoring.

## 2024-11-22 NOTE — PROGRESS NOTES
ID note chronic osteomyelitis  Subjective: Had some pain in the abdomen.  Had ERCP yesterday which was unsuccessful and planning for IR PCT  Afebrile    Physical Exam:   Vital Signs   Temp:  [96.1 °F (35.6 °C)-97.7 °F (36.5 °C)] 97 °F (36.1 °C)  Heart Rate:  [73-94] 79  Resp:  [16-18] 16  BP: ()/(49-91) 97/64    GENERAL: Awake and alert, chronically ill-appearing  HEENT: Oropharynx is clear. Hearing is grossly normal.   EYES: . No conjunctival injection. No lid lag.   LUNGS:normal respiratory effort.   SKIN: no cutaneous eruptions in exposed areas  PSYCHIATRIC: Pleasant mood and affect but limited insight, and judgment.     Results Review:  White count 6.8  , total bilirubin 3.4, AST 72, alk phos 859    A/p  1.  Obstructing pancreatic mass, likely malignancy  2.  Chronic sacral decubitus ulcer stage IV osteomyelitis    Continue to hold antibiotics.  CA 19-9 was 904 concerning for malignancy.  Plan PCT.  Nothing more to add currently and we will sign off           good balance

## 2024-11-22 NOTE — ANESTHESIA POSTPROCEDURE EVALUATION
Patient: Chantal Kumar    Procedure Summary       Date: 11/21/24 Room / Location:  [facility] /  [facility]    Anesthesia Start: 1646 Anesthesia Stop: 1840    Procedure: ENDOSCOPIC RETROGRADE CHOLANGIOPANCREATOGRAPHY with sphincterotomy Diagnosis:     Surgeons: Angle Castaneda MD Provider: Cathy Rincon MD    Anesthesia Type: general ASA Status: 4            Anesthesia Type: general    Vitals  Vitals Value Taken Time   /68 11/21/24 1915   Temp 36.5 °C (97.7 °F) 11/21/24 1845   Pulse 93 11/21/24 1929   Resp 16 11/21/24 1915   SpO2 96 % 11/21/24 1929   Vitals shown include unfiled device data.        Post Anesthesia Care and Evaluation    Patient location during evaluation: PACU    Comments: Patient was discharged from the PACU without being evaluated by anesthesia.  No apparent anesthetic complications were reported. Non-medically directed

## 2024-11-22 NOTE — THERAPY PROGRESS REPORT/RE-CERT
Discharge Planning Assessment  [facility]     Patient Name: Chantal Kumar  MRN: [id number]  Today's Date: 11/22/2024    Admit Date: 11/20/2024    Plan: Back to [facility] Medicaid bed and will need transportation   Discharge Needs Assessment    No documentation.                  Discharge Plan       Row Name 11/22/24 1119       Plan    Plan Comments Spoke with Destani/niece and she asked that the original Living Will directive to be placed in the patient's packet for the nursing along with a copy of one. I did place in the discharge packet for nursing home. KATYA Dowling, [facility]                  Continued Care and Services - Admitted Since 11/20/2024       Destination       Service Provider Request Status Services Address Phone Fax Patient Preferred    [facility] Accepted -- [address] [place] [address] [phone] --                  Expected Discharge Date and Time       Expected Discharge Date Expected Discharge Time    Nov 24, 2024            Demographic Summary    No documentation.                  Functional Status    No documentation.                  Psychosocial    No documentation.                  Abuse/Neglect    No documentation.                  Legal    No documentation.                  Substance Abuse    No documentation.                  Patient Forms    No documentation.                     Vikki Dowling, RN

## 2024-11-23 LAB
ALBUMIN SERPL-MCNC: 2.1 G/DL (ref 3.5–5.2)
ALBUMIN/GLOB SERPL: 0.5 G/DL
ALP SERPL-CCNC: 731 U/L (ref 39–117)
ALT SERPL W P-5'-P-CCNC: 78 U/L (ref 1–33)
ANION GAP SERPL CALCULATED.3IONS-SCNC: 9.7 MMOL/L (ref 5–15)
AST SERPL-CCNC: 40 U/L (ref 1–32)
BASOPHILS # BLD AUTO: 0.02 10*3/MM3 (ref 0–0.2)
BASOPHILS NFR BLD AUTO: 0.3 % (ref 0–1.5)
BILIRUB SERPL-MCNC: 2.1 MG/DL (ref 0–1.2)
BUN SERPL-MCNC: 12 MG/DL (ref 8–23)
BUN/CREAT SERPL: 18.8 (ref 7–25)
CALCIUM SPEC-SCNC: 8.4 MG/DL (ref 8.6–10.5)
CHLORIDE SERPL-SCNC: 109 MMOL/L (ref 98–107)
CO2 SERPL-SCNC: 20.3 MMOL/L (ref 22–29)
CREAT SERPL-MCNC: 0.64 MG/DL (ref 0.57–1)
DEPRECATED RDW RBC AUTO: 43.8 FL (ref 37–54)
EGFRCR SERPLBLD CKD-EPI 2021: 92.9 ML/MIN/1.73
EOSINOPHIL # BLD AUTO: 0.08 10*3/MM3 (ref 0–0.4)
EOSINOPHIL NFR BLD AUTO: 1.2 % (ref 0.3–6.2)
ERYTHROCYTE [DISTWIDTH] IN BLOOD BY AUTOMATED COUNT: 14.2 % (ref 12.3–15.4)
GLOBULIN UR ELPH-MCNC: 3.9 GM/DL
GLUCOSE BLDC GLUCOMTR-MCNC: 195 MG/DL (ref 70–130)
GLUCOSE BLDC GLUCOMTR-MCNC: 253 MG/DL (ref 70–130)
GLUCOSE BLDC GLUCOMTR-MCNC: 80 MG/DL (ref 70–130)
GLUCOSE BLDC GLUCOMTR-MCNC: 81 MG/DL (ref 70–130)
GLUCOSE SERPL-MCNC: 82 MG/DL (ref 65–99)
HCT VFR BLD AUTO: 38.6 % (ref 34–46.6)
HGB BLD-MCNC: 11.7 G/DL (ref 12–15.9)
IMM GRANULOCYTES # BLD AUTO: 0.03 10*3/MM3 (ref 0–0.05)
IMM GRANULOCYTES NFR BLD AUTO: 0.5 % (ref 0–0.5)
INR PPP: 1.27 (ref 0.9–1.1)
LYMPHOCYTES # BLD AUTO: 0.84 10*3/MM3 (ref 0.7–3.1)
LYMPHOCYTES NFR BLD AUTO: 12.9 % (ref 19.6–45.3)
MCH RBC QN AUTO: 25.9 PG (ref 26.6–33)
MCHC RBC AUTO-ENTMCNC: 30.3 G/DL (ref 31.5–35.7)
MCV RBC AUTO: 85.4 FL (ref 79–97)
MONOCYTES # BLD AUTO: 0.3 10*3/MM3 (ref 0.1–0.9)
MONOCYTES NFR BLD AUTO: 4.6 % (ref 5–12)
NEUTROPHILS NFR BLD AUTO: 5.25 10*3/MM3 (ref 1.7–7)
NEUTROPHILS NFR BLD AUTO: 80.5 % (ref 42.7–76)
NRBC BLD AUTO-RTO: 0 /100 WBC (ref 0–0.2)
PLATELET # BLD AUTO: 174 10*3/MM3 (ref 140–450)
PMV BLD AUTO: 10.2 FL (ref 6–12)
POTASSIUM SERPL-SCNC: 3.6 MMOL/L (ref 3.5–5.2)
POTASSIUM SERPL-SCNC: 3.8 MMOL/L (ref 3.5–5.2)
PROT SERPL-MCNC: 6 G/DL (ref 6–8.5)
PROTHROMBIN TIME: 16.1 SECONDS (ref 11.7–14.2)
QT INTERVAL: 427 MS
QTC INTERVAL: 490 MS
RBC # BLD AUTO: 4.52 10*6/MM3 (ref 3.77–5.28)
SODIUM SERPL-SCNC: 139 MMOL/L (ref 136–145)
WBC NRBC COR # BLD AUTO: 6.52 10*3/MM3 (ref 3.4–10.8)

## 2024-11-23 PROCEDURE — 25810000003 LACTATED RINGERS PER 1000 ML: Performed by: HOSPITALIST

## 2024-11-23 PROCEDURE — 80053 COMPREHEN METABOLIC PANEL: CPT | Performed by: HOSPITALIST

## 2024-11-23 PROCEDURE — 63710000001 INSULIN GLARGINE PER 5 UNITS: Performed by: INTERNAL MEDICINE

## 2024-11-23 PROCEDURE — 93010 ELECTROCARDIOGRAM REPORT: CPT | Performed by: INTERNAL MEDICINE

## 2024-11-23 PROCEDURE — 93005 ELECTROCARDIOGRAM TRACING: CPT | Performed by: HOSPITALIST

## 2024-11-23 PROCEDURE — 63710000001 INSULIN LISPRO (HUMAN) PER 5 UNITS: Performed by: INTERNAL MEDICINE

## 2024-11-23 PROCEDURE — 82948 REAGENT STRIP/BLOOD GLUCOSE: CPT

## 2024-11-23 PROCEDURE — 85610 PROTHROMBIN TIME: CPT | Performed by: NURSE PRACTITIONER

## 2024-11-23 PROCEDURE — 99221 1ST HOSP IP/OBS SF/LOW 40: CPT | Performed by: STUDENT IN AN ORGANIZED HEALTH CARE EDUCATION/TRAINING PROGRAM

## 2024-11-23 PROCEDURE — 25010000002 POTASSIUM CHLORIDE 10 MEQ/100ML SOLUTION: Performed by: HOSPITALIST

## 2024-11-23 PROCEDURE — 84132 ASSAY OF SERUM POTASSIUM: CPT | Performed by: HOSPITALIST

## 2024-11-23 PROCEDURE — 85025 COMPLETE CBC W/AUTO DIFF WBC: CPT | Performed by: HOSPITALIST

## 2024-11-23 RX ORDER — SODIUM CHLORIDE, SODIUM LACTATE, POTASSIUM CHLORIDE, CALCIUM CHLORIDE 600; 310; 30; 20 MG/100ML; MG/100ML; MG/100ML; MG/100ML
75 INJECTION, SOLUTION INTRAVENOUS CONTINUOUS
Status: ACTIVE | OUTPATIENT
Start: 2024-11-23 | End: 2024-11-24

## 2024-11-23 RX ORDER — POTASSIUM CHLORIDE 7.45 MG/ML
10 INJECTION INTRAVENOUS
Status: COMPLETED | OUTPATIENT
Start: 2024-11-23 | End: 2024-11-23

## 2024-11-23 RX ORDER — METOCLOPRAMIDE HYDROCHLORIDE 5 MG/ML
10 INJECTION INTRAMUSCULAR; INTRAVENOUS ONCE
Status: COMPLETED | OUTPATIENT
Start: 2024-11-24 | End: 2024-11-24

## 2024-11-23 RX ORDER — QUETIAPINE FUMARATE 100 MG/1
50 TABLET, FILM COATED ORAL NIGHTLY
Status: DISCONTINUED | OUTPATIENT
Start: 2024-11-23 | End: 2024-12-01 | Stop reason: HOSPADM

## 2024-11-23 RX ORDER — METOPROLOL TARTRATE 25 MG/1
25 TABLET, FILM COATED ORAL 2 TIMES DAILY
Status: DISCONTINUED | OUTPATIENT
Start: 2024-11-23 | End: 2024-11-24

## 2024-11-23 RX ADMIN — DIGOXIN 125 MCG: 125 TABLET ORAL at 15:46

## 2024-11-23 RX ADMIN — CHOLESTYRAMINE 4 G: 4 POWDER, FOR SUSPENSION ORAL at 10:11

## 2024-11-23 RX ADMIN — SODIUM CHLORIDE, SODIUM LACTATE, POTASSIUM CHLORIDE, CALCIUM CHLORIDE 75 ML/HR: 20; 30; 600; 310 INJECTION, SOLUTION INTRAVENOUS at 06:09

## 2024-11-23 RX ADMIN — ATORVASTATIN CALCIUM 10 MG: 20 TABLET, FILM COATED ORAL at 09:15

## 2024-11-23 RX ADMIN — LEVETIRACETAM 500 MG: 500 TABLET, FILM COATED ORAL at 20:13

## 2024-11-23 RX ADMIN — BUPROPION HYDROCHLORIDE 150 MG: 150 TABLET, EXTENDED RELEASE ORAL at 09:14

## 2024-11-23 RX ADMIN — INSULIN GLARGINE 20 UNITS: 100 INJECTION, SOLUTION SUBCUTANEOUS at 20:41

## 2024-11-23 RX ADMIN — BUSPIRONE HYDROCHLORIDE 15 MG: 15 TABLET ORAL at 09:15

## 2024-11-23 RX ADMIN — FERROUS SULFATE TAB 325 MG (65 MG ELEMENTAL FE) 325 MG: 325 (65 FE) TAB at 09:16

## 2024-11-23 RX ADMIN — BACLOFEN 10 MG: 10 TABLET ORAL at 09:14

## 2024-11-23 RX ADMIN — MILNACIPRAN HYDROCHLORIDE 50 MG: 50 TABLET, FILM COATED ORAL at 09:14

## 2024-11-23 RX ADMIN — INSULIN LISPRO 2 UNITS: 100 INJECTION, SOLUTION INTRAVENOUS; SUBCUTANEOUS at 17:41

## 2024-11-23 RX ADMIN — POTASSIUM CHLORIDE 10 MEQ: 7.46 INJECTION, SOLUTION INTRAVENOUS at 09:01

## 2024-11-23 RX ADMIN — CILOSTAZOL 100 MG: 100 TABLET ORAL at 20:16

## 2024-11-23 RX ADMIN — BACLOFEN 10 MG: 10 TABLET ORAL at 20:14

## 2024-11-23 RX ADMIN — QUETIAPINE FUMARATE 50 MG: 100 TABLET ORAL at 20:13

## 2024-11-23 RX ADMIN — SODIUM CHLORIDE, SODIUM LACTATE, POTASSIUM CHLORIDE, CALCIUM CHLORIDE 75 ML/HR: 20; 30; 600; 310 INJECTION, SOLUTION INTRAVENOUS at 19:25

## 2024-11-23 RX ADMIN — TRAZODONE HYDROCHLORIDE 50 MG: 50 TABLET ORAL at 20:14

## 2024-11-23 RX ADMIN — BUSPIRONE HYDROCHLORIDE 15 MG: 15 TABLET ORAL at 20:14

## 2024-11-23 RX ADMIN — BUSPIRONE HYDROCHLORIDE 15 MG: 15 TABLET ORAL at 17:41

## 2024-11-23 RX ADMIN — Medication 1 TABLET: at 09:15

## 2024-11-23 RX ADMIN — POTASSIUM CHLORIDE 10 MEQ: 7.46 INJECTION, SOLUTION INTRAVENOUS at 10:06

## 2024-11-23 RX ADMIN — LEVETIRACETAM 500 MG: 500 TABLET, FILM COATED ORAL at 09:16

## 2024-11-23 RX ADMIN — TRAMADOL HYDROCHLORIDE 50 MG: 50 TABLET, COATED ORAL at 07:14

## 2024-11-23 RX ADMIN — CILOSTAZOL 100 MG: 100 TABLET ORAL at 09:15

## 2024-11-23 RX ADMIN — MILNACIPRAN HYDROCHLORIDE 50 MG: 50 TABLET, FILM COATED ORAL at 20:17

## 2024-11-23 RX ADMIN — INSULIN LISPRO 4 UNITS: 100 INJECTION, SOLUTION INTRAVENOUS; SUBCUTANEOUS at 20:41

## 2024-11-23 RX ADMIN — TRAMADOL HYDROCHLORIDE 50 MG: 50 TABLET, COATED ORAL at 16:28

## 2024-11-23 RX ADMIN — Medication 10 ML: at 10:03

## 2024-11-23 RX ADMIN — ASPIRIN 81 MG: 81 TABLET, COATED ORAL at 09:15

## 2024-11-23 NOTE — PLAN OF CARE
Goal Outcome Evaluation:              Outcome Evaluation[place] [facility] [place] precautions maintained. IV fluids infusing. VSS. Plan for ERCP tomorrow AM. Peterson catheter in place.

## 2024-11-23 NOTE — PLAN OF CARE
Goal Outcome Evaluation:  Plan of Care Reviewed With: patient        Progress: no change  Outcome Evaluation: VSS. CT of the abdomen completed overnight. IVF continues as ordered. Remains on 3L nc. Labs for the morning.               Problem: Adult Inpatient Plan of Care  Goal: Plan of Care Review  Outcome: Not Progressing  Flowsheets (Taken 11/23/2024 0451)  Progress: no change  Outcome Evaluation: VSS. CT of the abdomen completed overnight. IVF continues as ordered. Remains on 3L nc. Labs for the morning.  Plan of Care Reviewed With: patient  Goal: Patient-Specific Goal (Individualized)  Outcome: Not Progressing  Goal: Absence of Hospital-Acquired Illness or Injury  Outcome: Not Progressing  Intervention: Identify and Manage Fall Risk  Recent Flowsheet Documentation  Taken 11/23/2024 0424 by Stacia Montemayor RN  Safety Promotion/Fall Prevention:   activity supervised   clutter free environment maintained   assistive device/personal items within reach   fall prevention program maintained   nonskid shoes/slippers when out of bed   room organization consistent   safety round/check completed  Taken 11/23/2024 0215 by Stacia Montemayor, RN  Safety Promotion/Fall Prevention:   activity supervised   assistive device/personal items within reach   clutter free environment maintained   fall prevention program maintained   nonskid shoes/slippers when out of bed   room organization consistent   safety round/check completed  Taken 11/23/2024 0021 by Stacia Montemayor RN  Safety Promotion/Fall Prevention:   activity supervised   assistive device/personal items within reach   clutter free environment maintained   fall prevention program maintained   nonskid shoes/slippers when out of bed   room organization consistent   safety round/check completed  Taken 11/22/2024 2230 by Stacia Montemayor RN  Safety Promotion/Fall Prevention:   activity supervised   assistive device/personal items within reach   clutter free environment maintained   fall  prevention program maintained   nonskid shoes/slippers when out of bed   safety round/check completed   room organization consistent  Taken 11/22/2024 2033 by Stacia Montemayor RN  Safety Promotion/Fall Prevention:   activity supervised   assistive device/personal items within reach   clutter free environment maintained   fall prevention program maintained   nonskid shoes/slippers when out of bed   safety round/check completed   room organization consistent  Intervention: Prevent Skin Injury  Recent Flowsheet Documentation  Taken 11/23/2024 0424 by Stacia Montemayor RN  Body Position: supine  Taken 11/23/2024 0215 by Stacia Montemayor RN  Body Position:   right   tilted  Taken 11/23/2024 0021 by Stacia Montemayor RN  Body Position:   turned   right   tilted  Taken 11/22/2024 2033 by Stacia Montemayor RN  Body Position: supine  Skin Protection: incontinence pads utilized  Goal: Optimal Comfort and Wellbeing  Outcome: Not Progressing  Intervention: Provide Person-Centered Care  Recent Flowsheet Documentation  Taken 11/22/2024 2033 by Stacia Montemayor RN  Trust Relationship/Rapport:   care explained   choices provided   questions answered   questions encouraged   reassurance provided   thoughts/feelings acknowledged   empathic listening provided  Goal: Readiness for Transition of Care  Outcome: Not Progressing     Problem: Fall Injury Risk  Goal: Absence of Fall and Fall-Related Injury  Outcome: Not Progressing  Intervention: Identify and Manage Contributors  Recent Flowsheet Documentation  Taken 11/22/2024 2230 by Stacia Montemayor RN  Medication Review/Management: medications reviewed  Taken 11/22/2024 2033 by Stacia Montemayor RN  Medication Review/Management: medications reviewed  Intervention: Promote Injury-Free Environment  Recent Flowsheet Documentation  Taken 11/23/2024 0424 by Stacia Montemayor RN  Safety Promotion/Fall Prevention:   activity supervised   clutter free environment maintained   assistive device/personal items within reach   fall  prevention program maintained   nonskid shoes/slippers when out of bed   room organization consistent   safety round/check completed  Taken 11/23/2024 0215 by Stacia Montemayor RN  Safety Promotion/Fall Prevention:   activity supervised   assistive device/personal items within reach   clutter free environment maintained   fall prevention program maintained   nonskid shoes/slippers when out of bed   room organization consistent   safety round/check completed  Taken 11/23/2024 0021 by Stacia Montemayor RN  Safety Promotion/Fall Prevention:   activity supervised   assistive device/personal items within reach   clutter free environment maintained   fall prevention program maintained   nonskid shoes/slippers when out of bed   room organization consistent   safety round/check completed  Taken 11/22/2024 2230 by Stacia Montemayor RN  Safety Promotion/Fall Prevention:   activity supervised   assistive device/personal items within reach   clutter free environment maintained   fall prevention program maintained   nonskid shoes/slippers when out of bed   safety round/check completed   room organization consistent  Taken 11/22/2024 2033 by Stacia Montemayor RN  Safety Promotion/Fall Prevention:   activity supervised   assistive device/personal items within reach   clutter free environment maintained   fall prevention program maintained   nonskid shoes/slippers when out of bed   safety round/check completed   room organization consistent     Problem: Skin Injury Risk Increased  Goal: Skin Health and Integrity  Outcome: Not Progressing  Intervention: Optimize Skin Protection  Recent Flowsheet Documentation  Taken 11/23/2024 0424 by Stacia Montemayor RN  Head of Bed (HOB) Positioning: HOB at 15 degrees  Taken 11/23/2024 0215 by Stacia Montemayor RN  Head of Bed (HOB) Positioning: HOB at 15 degrees  Taken 11/23/2024 0021 by Stacia Montemayor RN  Head of Bed (HOB) Positioning: HOB at 15 degrees  Taken 11/22/2024 2033 by Stacia Montemayor RN  Activity Management:  bedrest  Pressure Reduction Techniques:   frequent weight shift encouraged   heels elevated off bed   weight shift assistance provided  Head of Bed (HOB) Positioning: HOB at 20-30 degrees  Skin Protection: incontinence pads utilized     Problem: Pain Acute  Goal: Optimal Pain Control and Function  Outcome: Not Progressing  Intervention: Optimize Psychosocial Wellbeing  Recent Flowsheet Documentation  Taken 11/22/2024 2033 by Stacia Monteamyor RN  Diversional Activities: television  Intervention: Prevent or Manage Pain  Recent Flowsheet Documentation  Taken 11/22/2024 2230 by Stacia Montemayor RN  Medication Review/Management: medications reviewed  Taken 11/22/2024 2033 by Stacia Montemayor RN  Medication Review/Management: medications reviewed

## 2024-11-23 NOTE — CONSULTS
Date of Hospital Visit: 24  Encounter Provider: Kj Lee MD  Place of Service: [facility] CARDIOLOGY  Patient Name: Chantal Kumar  :1950  Referral Provider: Jeff Frye DO    Chief complaint  A-fib    History of Present Illness  74-year-old woman with diabetes, hypertension, hyperlipidemia, prior CVA, PAF who presented with abdominal pain.CT abdomen and revealed a pancreatic head mass concerning for adenocarcinoma.  She had unsuccessful sphincterotomy and cannulation of the bile duct and plan is for PTC with IR.  She went into A-fib with [facility] and consulted for this. She is now in NSR.    She did experience palpitations and she notes that she has this multiple times per week.    Past Medical History:   Diagnosis Date    Arthritis     Bipolar disorder, unspecified     Borderline personality disorder     CHF (congestive heart failure)     Chronic pain     COPD (chronic obstructive pulmonary disease)     Depression     Diabetes mellitus     Dysphagia     Generalized anxiety disorder     GERD (gastroesophageal reflux disease)     Hyperlipidemia     Hypertension     Migraine     Nightmare disorder     Osteoporosis     Pressure ulcer of unspecified site, stage 4     Seizures     Stroke     Type 2 diabetes mellitus with diabetic neuropathy, unspecified     Unspecified atrial fibrillation        Past Surgical History:   Procedure Laterality Date    ABOVE KNEE AMPUTATION Bilateral 2022    Procedure: Bilateral above the knee amputation;  Surgeon: Herber Rodriguez MD;  Location: [facility] MAIN OR;  Service: Vascular;  Laterality: Bilateral;    ERCP N/A 2024    Procedure: ENDOSCOPIC RETROGRADE CHOLANGIOPANCREATOGRAPHY with sphincterotomy;  Surgeon: Angel Castaneda MD;  Location: [facility] ENDOSCOPY;  Service: Gastroenterology;  Laterality: N/A;  pre: bile duct obstruction  post: periampula diveritculum       Medications Prior to Admission   Medication Sig  Dispense Refill Last Dose/Taking    apixaban (ELIQUIS) 5 MG tablet tablet Take 1 tablet by mouth Every 12 (Twelve) Hours. Indications: Atrial Fibrillation 60 tablet 0 11/19/2024 Morning    ascorbic acid (VITAMIN C) 500 MG tablet Take 1 tablet by mouth Daily.   11/19/2024 Morning    aspirin 81 MG EC tablet Take 1 tablet by mouth Daily.   11/19/2024 Morning    atorvastatin (LIPITOR) 10 MG tablet Take  by mouth.   11/19/2024 Morning    atorvastatin (LIPITOR) 10 MG tablet Take 1 tablet by mouth Daily.   11/19/2024 Morning    baclofen (LIORESAL) 10 MG tablet Take 1 tablet by mouth 2 (Two) Times a Day.   11/19/2024 Morning    buPROPion XL (WELLBUTRIN XL) 150 MG 24 hr tablet Take 1 tablet by mouth Daily.   11/19/2024 Morning    busPIRone (BUSPAR) 15 MG tablet Take 1 tablet by mouth 3 (Three) Times a Day.   11/19/2024 Noon    cilostazol (PLETAL) 100 MG tablet Take 1 tablet by mouth 2 (Two) Times a Day.   11/19/2024 Morning    colestipol (COLESTID) 1 g tablet Take 1 tablet by mouth 2 (Two) Times a Day.   11/19/2024 Morning    digoxin (LANOXIN) 125 MCG tablet Take 1 tablet by mouth Daily.   11/19/2024 Morning    empagliflozin (Jardiance) 10 MG tablet tablet Take 1 tablet by mouth Daily.   11/19/2024 Morning    Ferrous Fumarate 324 (106 Fe) MG tablet Take 324 mg by mouth Daily.   11/19/2024 Morning    furosemide (LASIX) 20 MG tablet Take 1 tablet by mouth.   11/19/2024 Morning    insulin lispro (humaLOG) 100 UNIT/ML injection Inject  under the skin into the appropriate area as directed 3 (Three) Times a Day Before Meals. Sliding scale   11/19/2024 Noon    l-methylfolate calcium (DEPLIN) 7.5 MG tablet tablet Take 1 tablet by mouth Daily.   11/19/2024 Morning    levETIRAcetam (KEPPRA) 500 MG tablet Take 1 tablet by mouth 2 (Two) Times a Day.   11/19/2024 Morning    metoprolol tartrate (LOPRESSOR) 100 MG tablet Take 1 tablet by mouth 2 (Two) Times a Day.   11/19/2024 Morning    milnacipran (SAVELLA) 50 MG tablet tablet Take 1  tablet by mouth Every 12 (Twelve) Hours.   11/19/2024 Morning    multivitamin with minerals tablet tablet Take 1 tablet by mouth Daily.   11/19/2024 Morning    potassium chloride (KLOR-CON M10) 10 MEQ CR tablet Take 2 tablets by mouth 2 (Two) Times a Day.   11/19/2024 Evening    prazosin (MINIPRESS) 2 MG capsule Take 1 capsule by mouth Every Night.   11/18/2024 Evening    tiZANidine (ZANAFLEX) 2 MG tablet Take 1 tablet by mouth 2 (Two) Times a Day.   11/19/2024 Bedtime    traMADol (ULTRAM) 50 MG tablet Take 1 tablet by mouth Every 8 (Eight) Hours As Needed for Moderate Pain.   11/19/2024 Noon    insulin glargine (LANTUS, SEMGLEE) 100 UNIT/ML injection Inject 60 Units under the skin into the appropriate area as directed Every Night.   11/18/2024 Bedtime    QUEtiapine (SEROquel) 100 MG tablet Take 1 tablet by mouth Every Night.   11/18/2024 Bedtime    traZODone (DESYREL) 50 MG tablet Take 1 tablet by mouth.   11/18/2024 Bedtime       Current Meds  Scheduled Meds:aspirin, 81 mg, Oral, Daily  atorvastatin, 10 mg, Oral, Daily  baclofen, 10 mg, Oral, BID  buPROPion XL, 150 mg, Oral, Daily  busPIRone, 15 mg, Oral, TID  cholestyramine light, 1 packet, Oral, Daily  cilostazol, 100 mg, Oral, BID  digoxin, 125 mcg, Oral, Daily  ferrous sulfate, 325 mg, Oral, Daily With Breakfast  insulin glargine, 20 Units, Subcutaneous, Nightly  insulin lispro, 2-7 Units, Subcutaneous, 4x Daily AC & at Bedtime  levETIRAcetam, 500 mg, Oral, BID  metoprolol tartrate, 25 mg, Oral, BID  milnacipran, 50 mg, Oral, Q12H  multivitamin with minerals, 1 tablet, Oral, Daily  prazosin, 2 mg, Oral, Nightly  QUEtiapine, 50 mg, Oral, Nightly  sodium chloride, 10 mL, Intravenous, Q12H  traZODone, 50 mg, Oral, Nightly      Continuous Infusions:lactated ringers, 75 mL/hr, Last Rate: 75 mL/hr (11/23/24 0609)  lactated ringers, 75 mL/hr      PRN Meds:.  dextrose    dextrose    glucagon (human recombinant)    ondansetron ODT **OR** ondansetron    Potassium  Replacement - Follow Nurse / BPA Driven Protocol    sodium chloride    sodium chloride    tiZANidine    traMADol    Allergies as of 11/20/2024    (No Known Allergies)       Social History     Socioeconomic History    Marital status: Single   Tobacco Use    Smoking status: Never     Passive exposure: Never    Smokeless tobacco: Never   Vaping Use    Vaping status: Never Used   Substance and Sexual Activity    Alcohol use: Never    Drug use: Never    Sexual activity: Defer       History reviewed. No pertinent family history.    REVIEW OF SYSTEMS:   12 point ROS was performed and is negative except as outlined in HPI          Objective:   Temp:  [97 °F (36.1 °C)-99.4 °F (37.4 °C)] 97.3 °F (36.3 °C)  Heart Rate:  [] 78  Resp:  [16-18] 18  BP: ()/(50-74) 95/50  Body mass index is 30.02 kg/m².  Flowsheet Rows      Flowsheet Row First Filed Value   Admission Height --   Admission Weight 84.6 kg (186 lb 8.2 oz) Documented at 11/20/2024 1216          Vitals:    11/23/24 0912   BP: 95/50   Pulse:    Resp:    Temp: 97.3 °F (36.3 °C)   SpO2: 93%       GEN: obese, no distress, alert and oriented  HEENT: NACT, EOMI, moist mucous membranes  Lungs: CTAB, no wheezes, rales or rhonchi  CV: normal rate, regular rhythm, normal S1, S2, no murmurs, +2 radial pulses b/l, no carotid bruit  Abdomen: soft, nontender, nondistended, NABS  Extremities: b/l AKA  Skin: no rash, warm, dry  Heme/Lymph: no bruising  Psych: organized thought, normal behavior and affect      Lab Review:   Results from last 7 days   Lab Units 11/23/24  0626   SODIUM mmol/L 139   POTASSIUM mmol/L 3.6   CHLORIDE mmol/L 109*   CO2 mmol/L 20.3*   BUN mg/dL 12   CREATININE mg/dL 0.64   CALCIUM mg/dL 8.4*   BILIRUBIN mg/dL 2.1*   ALK PHOS U/L 731*   ALT (SGPT) U/L 78*   AST (SGOT) U/L 40*   GLUCOSE mg/dL 82         Results from last 7 days   Lab Units 11/23/24  0626 11/22/24  0742 11/20/24  1348   WBC 10*3/mm3 6.52 6.78 7.56   HEMOGLOBIN g/dL 11.7* 12.0 13.7    HEMATOCRIT % 38.6 38.4 43.1   PLATELETS 10*3/mm3 174 156 178     Results from last 7 days   Lab Units 11/23/24  0626 11/21/24  0733   INR  1.27* 1.27*     Results from last 7 days   Lab Units 11/22/24  0742   MAGNESIUM mg/dL 2.4         I personally viewed and interpreted the patient's EKG/Telemetry data)      Bile duct obstruction    Assessment and Plan:  #Pancreatic mass, likely maligancy  #PAF  #Hypertension  #Hyperlipidemia  #Diabetes  74-year-old woman with diabetes, hypertension, hyperlipidemia, prior CVA, PAF who presented with abdominal pain.CT abdomen and revealed a pancreatic head mass concerning for adenocarcinoma.  She had unsuccessful sphincterotomy and cannulation of the bile duct and plan is for PTC with IR.  She went into A-fib with [facility] and consulted for this. She is now in NSR.    - restart DOAC once cleared by other consultants  - continue metoprolol tartrate 25mg BID    Kj Hagan MD, [facility], [facility]  11/23/24  11:29 EST.

## 2024-11-23 NOTE — PROGRESS NOTES
[facility]     LOS: 3 days     Subjective:    CC:No chief complaint on file.    DIET:  Diet Order   Procedures    Diet: Diabetic; Consistent Carbohydrate; Fluid Consistency: Thin (IDDSI 0)    NPO Diet NPO Type: Strict NPO       Objective:    Vital Signs:  Temp:  [97 °F (36.1 °C)-99.4 °F (37.4 °C)] 97 °F (36.1 °C)  Heart Rate:  [] 82  Resp:  [16-18] 16  BP: ()/(50-74) 103/67    SpO2:  [92 %-94 %] 93 %  on  Flow (L/min) (Oxygen Therapy):  [3] 3;   Device (Oxygen Therapy): nasal cannula  Body mass index is 30.02 kg/m².    Physical Exam  Constitutional:       General: She is not in acute distress.  Cardiovascular:      Rate and Rhythm: Normal rate and regular rhythm.   Pulmonary:      Effort: Pulmonary effort is normal.      Breath sounds: Normal breath sounds.   Abdominal:      General: There is no distension.      Palpations: Abdomen is soft.      Tenderness: There is no abdominal tenderness.   Skin:     General: Skin is warm and dry.   Neurological:      General: No focal deficit present.      Mental Status: She is alert.   Psychiatric:         Mood and Affect: Mood normal.         Behavior: Behavior normal.         Results Review:    Glucose   Date Value Ref Range Status   11/23/2024 82 65 - 99 mg/dL Final   11/22/2024 114 (H) 65 - 99 mg/dL Final     Results from last 7 days   Lab Units 11/23/24  0626   WBC 10*3/mm3 6.52   HEMOGLOBIN g/dL 11.7*   HEMATOCRIT % 38.6   PLATELETS 10*3/mm3 174     Results from last 7 days   Lab Units 11/23/24  1316 11/23/24  0626   SODIUM mmol/L  --  139   POTASSIUM mmol/L 3.8 3.6   CHLORIDE mmol/L  --  109*   CO2 mmol/L  --  20.3*   BUN mg/dL  --  12   CREATININE mg/dL  --  0.64   CALCIUM mg/dL  --  8.4*   BILIRUBIN mg/dL  --  2.1*   ALK PHOS U/L  --  731*   ALT (SGPT) U/L  --  78*   AST (SGOT) U/L  --  40*   GLUCOSE mg/dL  --  82     Results from last 7 days   Lab Units 11/23/24  0626   INR  1.27*     Results from last 7 days   Lab Units  "11/22/24  0742   MAGNESIUM mg/dL 2.4         Cultures:  No results found for: \"BLOODCX\", \"URINECX\", \"WOUNDCX\", \"MRSACX\", \"RESPCX\", \"STOOLCX\"    I have reviewed daily medications and changes in CPOE    Scheduled meds  aspirin, 81 mg, Oral, Daily  atorvastatin, 10 mg, Oral, Daily  baclofen, 10 mg, Oral, BID  buPROPion XL, 150 mg, Oral, Daily  busPIRone, 15 mg, Oral, TID  cholestyramine light, 1 packet, Oral, Daily  cilostazol, 100 mg, Oral, BID  digoxin, 125 mcg, Oral, Daily  ferrous sulfate, 325 mg, Oral, Daily With Breakfast  insulin glargine, 20 Units, Subcutaneous, Nightly  insulin lispro, 2-7 Units, Subcutaneous, 4x Daily AC & at Bedtime  levETIRAcetam, 500 mg, Oral, BID  [START ON 11/24/2024] metoclopramide, 10 mg, Intravenous, Once  metoprolol tartrate, 25 mg, Oral, BID  milnacipran, 50 mg, Oral, Q12H  multivitamin with minerals, 1 tablet, Oral, Daily  prazosin, 2 mg, Oral, Nightly  QUEtiapine, 50 mg, Oral, Nightly  sodium chloride, 10 mL, Intravenous, Q12H  traZODone, 50 mg, Oral, Nightly        lactated ringers, 75 mL/hr, Last Rate: 75 mL/hr (11/23/24 1228)      PRN meds    dextrose    dextrose    glucagon (human recombinant)    ondansetron ODT **OR** ondansetron    Potassium Replacement - Follow Nurse / BPA Driven Protocol    sodium chloride    sodium chloride    tiZANidine    traMADol        Bile duct obstruction        Assessment/Plan:    Bile duct stone with jaundice and elevated bilirubin 3.8  -ERCP completed  -Patient's been restarted on diet  -IV fluids  -possible drain placement on Monday  -Dr Alatorre to re-attempt ERCP tomorrow     Concern for osteomyelitis around decubitus ulcer  -procal nl, CRP elevated  -Hold on antibiotics  -Pictures of wound in chart, the wound does not appear infected by exam  -infectious disease consult and no antibiotics for now, f/u outpatient     Atrial fibrillation for which she is on Eliquis  -We will hold on Eliquis for now and restart when ok with GI   -Metoprolol 25 " twice daily  -Sinew with digoxin  -Cardiology  -aspirin    History of pressure ulcer and status post bilateral above-the-knee amputation  -Likely from severe peripheral vascular disease for which she is on aspirin and Pletal     Seizure history  -Continue with Elvin Briggs MD  11/23/24  18:43 EST

## 2024-11-24 ENCOUNTER — APPOINTMENT (OUTPATIENT)
Dept: GENERAL RADIOLOGY | Facility: HOSPITAL | Age: 74
DRG: 435 | End: 2024-11-24
Payer: MEDICARE

## 2024-11-24 ENCOUNTER — ANESTHESIA (OUTPATIENT)
Dept: GASTROENTEROLOGY | Facility: HOSPITAL | Age: 74
End: 2024-11-24
Payer: MEDICARE

## 2024-11-24 ENCOUNTER — INPATIENT HOSPITAL (OUTPATIENT)
Age: 74
End: 2024-11-24
Payer: MEDICARE

## 2024-11-24 ENCOUNTER — INPATIENT HOSPITAL (OUTPATIENT)
Dept: URBAN - METROPOLITAN AREA HOSPITAL 113 | Facility: HOSPITAL | Age: 74
End: 2024-11-24
Payer: MEDICARE

## 2024-11-24 ENCOUNTER — ANESTHESIA EVENT (OUTPATIENT)
Dept: GASTROENTEROLOGY | Facility: HOSPITAL | Age: 74
End: 2024-11-24
Payer: MEDICARE

## 2024-11-24 DIAGNOSIS — C24.0 MALIGNANT NEOPLASM OF EXTRAHEPATIC BILE DUCT: ICD-10-CM

## 2024-11-24 DIAGNOSIS — K83.1 OBSTRUCTION OF BILE DUCT: ICD-10-CM

## 2024-11-24 LAB
ALBUMIN SERPL-MCNC: 2.4 G/DL (ref 3.5–5.2)
ALBUMIN/GLOB SERPL: 0.8 G/DL
ALP SERPL-CCNC: 571 U/L (ref 39–117)
ALT SERPL W P-5'-P-CCNC: 59 U/L (ref 1–33)
ANION GAP SERPL CALCULATED.3IONS-SCNC: 9 MMOL/L (ref 5–15)
AST SERPL-CCNC: 26 U/L (ref 1–32)
BASOPHILS # BLD AUTO: 0.04 10*3/MM3 (ref 0–0.2)
BASOPHILS NFR BLD AUTO: 0.8 % (ref 0–1.5)
BILIRUB SERPL-MCNC: 1.4 MG/DL (ref 0–1.2)
BUN SERPL-MCNC: 9 MG/DL (ref 8–23)
BUN/CREAT SERPL: 15.5 (ref 7–25)
CALCIUM SPEC-SCNC: 8.3 MG/DL (ref 8.6–10.5)
CHLORIDE SERPL-SCNC: 108 MMOL/L (ref 98–107)
CO2 SERPL-SCNC: 21 MMOL/L (ref 22–29)
CREAT SERPL-MCNC: 0.58 MG/DL (ref 0.57–1)
DEPRECATED RDW RBC AUTO: 44.6 FL (ref 37–54)
DIGOXIN SERPL-MCNC: 0.5 NG/ML (ref 0.6–1.2)
EGFRCR SERPLBLD CKD-EPI 2021: 95.1 ML/MIN/1.73
EOSINOPHIL # BLD AUTO: 0.07 10*3/MM3 (ref 0–0.4)
EOSINOPHIL NFR BLD AUTO: 1.3 % (ref 0.3–6.2)
ERYTHROCYTE [DISTWIDTH] IN BLOOD BY AUTOMATED COUNT: 14.1 % (ref 12.3–15.4)
GLOBULIN UR ELPH-MCNC: 2.9 GM/DL
GLUCOSE BLDC GLUCOMTR-MCNC: 193 MG/DL (ref 70–130)
GLUCOSE BLDC GLUCOMTR-MCNC: 198 MG/DL (ref 70–130)
GLUCOSE BLDC GLUCOMTR-MCNC: 277 MG/DL (ref 70–130)
GLUCOSE BLDC GLUCOMTR-MCNC: 324 MG/DL (ref 70–130)
GLUCOSE SERPL-MCNC: 202 MG/DL (ref 65–99)
HCT VFR BLD AUTO: 35 % (ref 34–46.6)
HGB BLD-MCNC: 10.4 G/DL (ref 12–15.9)
IMM GRANULOCYTES # BLD AUTO: 0.02 10*3/MM3 (ref 0–0.05)
IMM GRANULOCYTES NFR BLD AUTO: 0.4 % (ref 0–0.5)
LYMPHOCYTES # BLD AUTO: 1.09 10*3/MM3 (ref 0.7–3.1)
LYMPHOCYTES NFR BLD AUTO: 20.5 % (ref 19.6–45.3)
MCH RBC QN AUTO: 25.8 PG (ref 26.6–33)
MCHC RBC AUTO-ENTMCNC: 29.7 G/DL (ref 31.5–35.7)
MCV RBC AUTO: 86.8 FL (ref 79–97)
MONOCYTES # BLD AUTO: 0.42 10*3/MM3 (ref 0.1–0.9)
MONOCYTES NFR BLD AUTO: 7.9 % (ref 5–12)
NEUTROPHILS NFR BLD AUTO: 3.69 10*3/MM3 (ref 1.7–7)
NEUTROPHILS NFR BLD AUTO: 69.1 % (ref 42.7–76)
NRBC BLD AUTO-RTO: 0 /100 WBC (ref 0–0.2)
PLATELET # BLD AUTO: 167 10*3/MM3 (ref 140–450)
PMV BLD AUTO: 10.4 FL (ref 6–12)
POTASSIUM SERPL-SCNC: 3.9 MMOL/L (ref 3.5–5.2)
PROT SERPL-MCNC: 5.3 G/DL (ref 6–8.5)
RBC # BLD AUTO: 4.03 10*6/MM3 (ref 3.77–5.28)
SODIUM SERPL-SCNC: 138 MMOL/L (ref 136–145)
WBC NRBC COR # BLD AUTO: 5.33 10*3/MM3 (ref 3.4–10.8)

## 2024-11-24 PROCEDURE — C1769 GUIDE WIRE: HCPCS | Performed by: INTERNAL MEDICINE

## 2024-11-24 PROCEDURE — 85025 COMPLETE CBC W/AUTO DIFF WBC: CPT | Performed by: HOSPITALIST

## 2024-11-24 PROCEDURE — 0F798DZ DILATION OF COMMON BILE DUCT WITH INTRALUMINAL DEVICE, VIA NATURAL OR ARTIFICIAL OPENING ENDOSCOPIC: ICD-10-PCS | Performed by: INTERNAL MEDICINE

## 2024-11-24 PROCEDURE — 25510000001 IOPAMIDOL 61 % SOLUTION: Performed by: INTERNAL MEDICINE

## 2024-11-24 PROCEDURE — 63710000001 INSULIN LISPRO (HUMAN) PER 5 UNITS: Performed by: INTERNAL MEDICINE

## 2024-11-24 PROCEDURE — 43262 ENDO CHOLANGIOPANCREATOGRAPH: CPT | Mod: 59 | Performed by: INTERNAL MEDICINE

## 2024-11-24 PROCEDURE — 43274 ERCP DUCT STENT PLACEMENT: CPT | Performed by: INTERNAL MEDICINE

## 2024-11-24 PROCEDURE — 82948 REAGENT STRIP/BLOOD GLUCOSE: CPT

## 2024-11-24 PROCEDURE — 80053 COMPREHEN METABOLIC PANEL: CPT | Performed by: HOSPITALIST

## 2024-11-24 PROCEDURE — 25010000002 SUGAMMADEX 200 MG/2ML SOLUTION: Performed by: ANESTHESIOLOGY

## 2024-11-24 PROCEDURE — 25010000002 PROPOFOL 10 MG/ML EMULSION: Performed by: ANESTHESIOLOGY

## 2024-11-24 PROCEDURE — 0FD98ZX EXTRACTION OF COMMON BILE DUCT, VIA NATURAL OR ARTIFICIAL OPENING ENDOSCOPIC, DIAGNOSTIC: ICD-10-PCS | Performed by: INTERNAL MEDICINE

## 2024-11-24 PROCEDURE — 88112 CYTOPATH CELL ENHANCE TECH: CPT | Performed by: INTERNAL MEDICINE

## 2024-11-24 PROCEDURE — C2625 STENT, NON-COR, TEM W/DEL SY: HCPCS | Performed by: INTERNAL MEDICINE

## 2024-11-24 PROCEDURE — 88305 TISSUE EXAM BY PATHOLOGIST: CPT | Performed by: INTERNAL MEDICINE

## 2024-11-24 PROCEDURE — 80162 ASSAY OF DIGOXIN TOTAL: CPT | Performed by: HOSPITALIST

## 2024-11-24 PROCEDURE — 63710000001 INSULIN GLARGINE PER 5 UNITS: Performed by: HOSPITALIST

## 2024-11-24 PROCEDURE — 25010000002 METOCLOPRAMIDE PER 10 MG: Performed by: INTERNAL MEDICINE

## 2024-11-24 PROCEDURE — 25810000003 SODIUM CHLORIDE 0.9 % SOLUTION: Performed by: INTERNAL MEDICINE

## 2024-11-24 PROCEDURE — 25010000002 LIDOCAINE 2% SOLUTION: Performed by: ANESTHESIOLOGY

## 2024-11-24 PROCEDURE — 74328 X-RAY BILE DUCT ENDOSCOPY: CPT

## 2024-11-24 PROCEDURE — 25010000002 ONDANSETRON PER 1 MG: Performed by: ANESTHESIOLOGY

## 2024-11-24 DEVICE — BILIARY STENT WITH NAVIFLEXTM RX DELIVERY SYSTEM
Type: IMPLANTABLE DEVICE | Site: BILE DUCT | Status: FUNCTIONAL
Brand: ADVANIX™ BILIARY

## 2024-11-24 RX ORDER — SODIUM CHLORIDE 9 MG/ML
INJECTION, SOLUTION INTRAVENOUS CONTINUOUS PRN
Status: DISCONTINUED | OUTPATIENT
Start: 2024-11-24 | End: 2024-11-24 | Stop reason: SURG

## 2024-11-24 RX ORDER — LIDOCAINE HYDROCHLORIDE 20 MG/ML
INJECTION, SOLUTION INFILTRATION; PERINEURAL AS NEEDED
Status: DISCONTINUED | OUTPATIENT
Start: 2024-11-24 | End: 2024-11-24 | Stop reason: SURG

## 2024-11-24 RX ORDER — ROCURONIUM BROMIDE 10 MG/ML
INJECTION, SOLUTION INTRAVENOUS AS NEEDED
Status: DISCONTINUED | OUTPATIENT
Start: 2024-11-24 | End: 2024-11-24 | Stop reason: SURG

## 2024-11-24 RX ORDER — SODIUM CHLORIDE 9 MG/ML
50 INJECTION, SOLUTION INTRAVENOUS ONCE
Status: COMPLETED | OUTPATIENT
Start: 2024-11-24 | End: 2024-11-24

## 2024-11-24 RX ORDER — INDOMETHACIN 100 MG
SUPPOSITORY, RECTAL RECTAL AS NEEDED
Status: DISCONTINUED | OUTPATIENT
Start: 2024-11-24 | End: 2024-11-24 | Stop reason: HOSPADM

## 2024-11-24 RX ORDER — LIDOCAINE HYDROCHLORIDE 40 MG/ML
SOLUTION TOPICAL AS NEEDED
Status: DISCONTINUED | OUTPATIENT
Start: 2024-11-24 | End: 2024-11-24 | Stop reason: SURG

## 2024-11-24 RX ORDER — ONDANSETRON 2 MG/ML
INJECTION INTRAMUSCULAR; INTRAVENOUS AS NEEDED
Status: DISCONTINUED | OUTPATIENT
Start: 2024-11-24 | End: 2024-11-24 | Stop reason: SURG

## 2024-11-24 RX ORDER — IOPAMIDOL 612 MG/ML
INJECTION, SOLUTION INTRAVASCULAR AS NEEDED
Status: DISCONTINUED | OUTPATIENT
Start: 2024-11-24 | End: 2024-11-24 | Stop reason: HOSPADM

## 2024-11-24 RX ORDER — PRAZOSIN HYDROCHLORIDE 1 MG/1
1 CAPSULE ORAL NIGHTLY
Status: DISCONTINUED | OUTPATIENT
Start: 2024-11-24 | End: 2024-12-01 | Stop reason: HOSPADM

## 2024-11-24 RX ORDER — PROPOFOL 10 MG/ML
VIAL (ML) INTRAVENOUS AS NEEDED
Status: DISCONTINUED | OUTPATIENT
Start: 2024-11-24 | End: 2024-11-24 | Stop reason: SURG

## 2024-11-24 RX ADMIN — TRAZODONE HYDROCHLORIDE 50 MG: 50 TABLET ORAL at 22:34

## 2024-11-24 RX ADMIN — BUSPIRONE HYDROCHLORIDE 15 MG: 15 TABLET ORAL at 12:55

## 2024-11-24 RX ADMIN — CHOLESTYRAMINE 4 G: 4 POWDER, FOR SUSPENSION ORAL at 22:37

## 2024-11-24 RX ADMIN — TRAMADOL HYDROCHLORIDE 50 MG: 50 TABLET, COATED ORAL at 14:51

## 2024-11-24 RX ADMIN — PRAZOSIN HYDROCHLORIDE 1 MG: 2 CAPSULE ORAL at 22:34

## 2024-11-24 RX ADMIN — ASPIRIN 81 MG: 81 TABLET, COATED ORAL at 12:54

## 2024-11-24 RX ADMIN — LIDOCAINE HYDROCHLORIDE 80 MG: 20 INJECTION, SOLUTION INFILTRATION; PERINEURAL at 10:46

## 2024-11-24 RX ADMIN — CILOSTAZOL 100 MG: 100 TABLET ORAL at 12:55

## 2024-11-24 RX ADMIN — QUETIAPINE FUMARATE 50 MG: 100 TABLET ORAL at 22:35

## 2024-11-24 RX ADMIN — SODIUM CHLORIDE: 9 INJECTION, SOLUTION INTRAVENOUS at 10:36

## 2024-11-24 RX ADMIN — BACLOFEN 10 MG: 10 TABLET ORAL at 13:06

## 2024-11-24 RX ADMIN — MILNACIPRAN HYDROCHLORIDE 50 MG: 50 TABLET, FILM COATED ORAL at 12:56

## 2024-11-24 RX ADMIN — ATORVASTATIN CALCIUM 10 MG: 20 TABLET, FILM COATED ORAL at 12:54

## 2024-11-24 RX ADMIN — INSULIN LISPRO 2 UNITS: 100 INJECTION, SOLUTION INTRAVENOUS; SUBCUTANEOUS at 12:54

## 2024-11-24 RX ADMIN — SODIUM CHLORIDE 50 ML/HR: 9 INJECTION, SOLUTION INTRAVENOUS at 10:24

## 2024-11-24 RX ADMIN — Medication 1 TABLET: at 12:55

## 2024-11-24 RX ADMIN — BUPROPION HYDROCHLORIDE 150 MG: 150 TABLET, EXTENDED RELEASE ORAL at 12:55

## 2024-11-24 RX ADMIN — LEVETIRACETAM 500 MG: 500 TABLET, FILM COATED ORAL at 12:55

## 2024-11-24 RX ADMIN — PROPOFOL 200 MCG/KG/MIN: 10 INJECTION, EMULSION INTRAVENOUS at 10:46

## 2024-11-24 RX ADMIN — INSULIN LISPRO 5 UNITS: 100 INJECTION, SOLUTION INTRAVENOUS; SUBCUTANEOUS at 17:34

## 2024-11-24 RX ADMIN — ROCURONIUM BROMIDE 50 MG: 10 INJECTION, SOLUTION INTRAVENOUS at 10:46

## 2024-11-24 RX ADMIN — METOCLOPRAMIDE 10 MG: 5 INJECTION, SOLUTION INTRAMUSCULAR; INTRAVENOUS at 05:00

## 2024-11-24 RX ADMIN — LIDOCAINE HYDROCHLORIDE 1 EACH: 40 SOLUTION TOPICAL at 10:49

## 2024-11-24 RX ADMIN — PROPOFOL 150 MG: 10 INJECTION, EMULSION INTRAVENOUS at 10:46

## 2024-11-24 RX ADMIN — Medication 10 ML: at 22:37

## 2024-11-24 RX ADMIN — TRAMADOL HYDROCHLORIDE 50 MG: 50 TABLET, COATED ORAL at 22:46

## 2024-11-24 RX ADMIN — MILNACIPRAN HYDROCHLORIDE 50 MG: 50 TABLET, FILM COATED ORAL at 22:34

## 2024-11-24 RX ADMIN — LEVETIRACETAM 500 MG: 500 TABLET, FILM COATED ORAL at 22:35

## 2024-11-24 RX ADMIN — BACLOFEN 10 MG: 10 TABLET ORAL at 22:35

## 2024-11-24 RX ADMIN — ONDANSETRON 4 MG: 2 INJECTION INTRAMUSCULAR; INTRAVENOUS at 11:07

## 2024-11-24 RX ADMIN — CILOSTAZOL 100 MG: 100 TABLET ORAL at 22:35

## 2024-11-24 RX ADMIN — INSULIN LISPRO 4 UNITS: 100 INJECTION, SOLUTION INTRAVENOUS; SUBCUTANEOUS at 22:36

## 2024-11-24 RX ADMIN — BUSPIRONE HYDROCHLORIDE 15 MG: 15 TABLET ORAL at 22:35

## 2024-11-24 RX ADMIN — TIZANIDINE 2 MG: 4 TABLET ORAL at 22:46

## 2024-11-24 RX ADMIN — Medication 10 ML: at 12:55

## 2024-11-24 RX ADMIN — DIGOXIN 125 MCG: 125 TABLET ORAL at 13:07

## 2024-11-24 RX ADMIN — SUGAMMADEX 400 MG: 100 INJECTION, SOLUTION INTRAVENOUS at 11:23

## 2024-11-24 RX ADMIN — FERROUS SULFATE TAB 325 MG (65 MG ELEMENTAL FE) 325 MG: 325 (65 FE) TAB at 13:06

## 2024-11-24 RX ADMIN — METOPROLOL TARTRATE 1 MG: 5 INJECTION, SOLUTION INTRAVENOUS at 11:07

## 2024-11-24 RX ADMIN — INSULIN GLARGINE 30 UNITS: 100 INJECTION, SOLUTION SUBCUTANEOUS at 22:35

## 2024-11-24 NOTE — PLAN OF CARE
Goal Outcome Evaluation:              Outcome Evaluation: BP running low, meds held.  denies pain.  Oxygen at 3 L.  blood sugar monitoring, insulin given last night.  rick with good aoutput.  Scheduled for ERCP today. Consent signed.  mepilex to sacral area CDI / side rails padded.

## 2024-11-24 NOTE — ANESTHESIA PREPROCEDURE EVALUATION
Anesthesia Evaluation     Patient summary reviewed and Nursing notes reviewed   NPO Solid Status: > 8 hours  NPO Liquid Status: > 2 hours           Airway   Mallampati: II  TM distance: >3 FB  Neck ROM: full  No difficulty expected  Comment: Grade I view with MAC 3  Dental    (+) edentulous    Pulmonary - normal exam    breath sounds clear to auscultation  (+) COPD,  Cardiovascular - normal exam    ECG reviewed  Rhythm: regular  Rate: normal    (+) hypertension 2 medications or greater, dysrhythmias Paroxysmal Atrial Fib, CHF , PVD, hyperlipidemia    ROS comment: EF 55% by ECHO 1/22/normal stress test 1/22    Neuro/Psych  (+) seizures, CVA, headaches, psychiatric history Bipolar, Depression and Anxiety    ROS Comment: Diabetic neuropathy/migraines/CVA  GI/Hepatic/Renal/Endo    (+) obesity, GERD, diabetes mellitus type 2 poorly controlled using insulin    ROS Comment: Pancreatic mass with CBD obstruction, s/p unsuccessful ERCP 3 days ago (could not get into CBD after 2 hours)    Musculoskeletal         ROS comment: Hx bilateral AKAs in 1/22/BMI was around 30 before AKAs  Abdominal   (+) obese   Substance History      OB/GYN          Other   arthritis, blood dyscrasia anemia,   history of cancer      Other Comment: Pancreatic mass/Hgb 10.4      Phys Exam Other: Bilateral AKAs              Anesthesia Plan    ASA 4     general   total IV anesthesia  (GETA)  intravenous induction     Anesthetic plan, risks, benefits, and alternatives have been provided, discussed and informed consent has been obtained with: patient.      CODE STATUS:    Code Status (Patient has no pulse and is not breathing): CPR (Attempt to Resuscitate)  Medical Interventions (Patient has pulse or is breathing): Full Support

## 2024-11-24 NOTE — PLAN OF CARE
Goal Outcome Evaluation:              Outcome Evaluation: VSS. SL. Monitoring blood sugars. On Full Liquid diet. ERCP done this AM. Fall precautions maintained. Bilateral AKA. Peterson catheter in place. Wound care done.

## 2024-11-24 NOTE — PROGRESS NOTES
[facility]     LOS: 4 days     Subjective:    CC:No chief complaint on file.    DIET:  Diet Order   Procedures    Diet: Liquid; Full Liquid; Fluid Consistency: Thin (IDDSI 0)       Objective:    Vital Signs:  Temp:  [96.7 °F (35.9 °C)-98.3 °F (36.8 °C)] 97 °F (36.1 °C)  Heart Rate:  [] 82  Resp:  [16-17] 16  BP: ()/(51-73) 113/67    SpO2:  [92 %-96 %] 96 %  on  Flow (L/min) (Oxygen Therapy):  [2-3] 2;   Device (Oxygen Therapy): nasal cannula  Body mass index is 30.02 kg/m².    Physical Exam  Constitutional:       General: She is not in acute distress.  Cardiovascular:      Rate and Rhythm: Normal rate and regular rhythm.   Pulmonary:      Effort: Pulmonary effort is normal.      Breath sounds: Normal breath sounds.   Abdominal:      General: There is no distension.      Palpations: Abdomen is soft.      Tenderness: There is no abdominal tenderness.   Musculoskeletal:      Comments: Lower extremity above the knee amputation   Skin:     General: Skin is warm and dry.   Neurological:      General: No focal deficit present.      Mental Status: She is alert.   Psychiatric:         Mood and Affect: Mood normal.         Behavior: Behavior normal.         Results Review:    Glucose   Date Value Ref Range Status   11/24/2024 202 (H) 65 - 99 mg/dL Final   11/23/2024 82 65 - 99 mg/dL Final   11/22/2024 114 (H) 65 - 99 mg/dL Final     Results from last 7 days   Lab Units 11/24/24  0628   WBC 10*3/mm3 5.33   HEMOGLOBIN g/dL 10.4*   HEMATOCRIT % 35.0   PLATELETS 10*3/mm3 167     Results from last 7 days   Lab Units 11/24/24  0628   SODIUM mmol/L 138   POTASSIUM mmol/L 3.9   CHLORIDE mmol/L 108*   CO2 mmol/L 21.0*   BUN mg/dL 9   CREATININE mg/dL 0.58   CALCIUM mg/dL 8.3*   BILIRUBIN mg/dL 1.4*   ALK PHOS U/L 571*   ALT (SGPT) U/L 59*   AST (SGOT) U/L 26   GLUCOSE mg/dL 202*     Results from last 7 days   Lab Units 11/23/24  0626   INR  1.27*     Results from last 7 days   Lab Units  "11/22/24  0742   MAGNESIUM mg/dL 2.4         Cultures:  No results found for: \"BLOODCX\", \"URINECX\", \"WOUNDCX\", \"MRSACX\", \"RESPCX\", \"STOOLCX\"    I have reviewed daily medications and changes in CPOE    Scheduled meds  aspirin, 81 mg, Oral, Daily  atorvastatin, 10 mg, Oral, Daily  baclofen, 10 mg, Oral, BID  buPROPion XL, 150 mg, Oral, Daily  busPIRone, 15 mg, Oral, TID  cholestyramine light, 1 packet, Oral, Daily  cilostazol, 100 mg, Oral, BID  digoxin, 125 mcg, Oral, Daily  ferrous sulfate, 325 mg, Oral, Daily With Breakfast  insulin glargine, 20 Units, Subcutaneous, Nightly  insulin lispro, 2-7 Units, Subcutaneous, 4x Daily AC & at Bedtime  levETIRAcetam, 500 mg, Oral, BID  milnacipran, 50 mg, Oral, Q12H  multivitamin with minerals, 1 tablet, Oral, Daily  prazosin, 1 mg, Oral, Nightly  QUEtiapine, 50 mg, Oral, Nightly  sodium chloride, 10 mL, Intravenous, Q12H  traZODone, 50 mg, Oral, Nightly             PRN meds    dextrose    dextrose    glucagon (human recombinant)    ondansetron ODT **OR** ondansetron    Potassium Replacement - Follow Nurse / BPA Driven Protocol    sodium chloride    sodium chloride    tiZANidine    traMADol        Bile duct obstruction    Bipolar disorder, unspecified    Secondary hypertension    Type 2 diabetes mellitus with diabetic neuropathy, unspecified    S/P AKA (above knee amputation) bilateral    Persistent atrial fibrillation        Assessment/Plan:    Bile duct stone with jaundice and elevated bilirubin 3.8, CA 19-9 is elevated at 904, pathology is pending  -ERCP completed and stent place  -Patient tolerating PO intake  -stop IV fluids  -no need for drain  -Alk phos is also trending down, alk phos could be also related to decubitus wound     Concern for osteomyelitis around decubitus ulcer  -procal nl, CRP elevated  -Hold on antibiotics  -Pictures of wound in chart, the wound does not appear infected by exam on admission  -infectious disease consult and no antibiotics for now, f/u " "outpatient with wound care  -She was seen by wound care here in the hospital and they recommend: \"Cleanse with NS - using q tip placed a piece of opticel into the wound space - apply a 4x4 or 6x6 optifoam by folding and adequately placing in the gluteal cleft assuring the foam portion of the dressing makes contact with skin; change every other day\"     Atrial fibrillation for which she is on Eliquis  -We will hold on metoprolol for now as blood pressure is on the low side  -continue with digoxin, check blood level (his level is 0.5)  -The patient has been seen by cardiology in the hospital  -continue with aspirin  -Gastroenterology would be okay with restarting Eliquis tomorrow    History of pressure ulcer and status post bilateral above-the-knee amputation  -Likely from severe peripheral vascular disease for which she is on aspirin and Pletal     Seizure history  -Continue with Keppra    Type 2 diabetes  -Lantus 20 units (home dose of 60 units/day), now that she is back on her diet will quickly titrate up, increase to 30 units nightly  -Blood sugar starting to increase  -scale insulin    Possible discharge 2 to 3 days    Patient confirms with me that she is a full code      Jeramy Briggs MD  11/24/24  15:28 EST      "

## 2024-11-24 NOTE — ANESTHESIA POSTPROCEDURE EVALUATION
Patient: Chantal Kumar    Procedure Summary       Date: 11/24/24 Room / Location: [facility] ENDOSCOPY 1 /  [facility]    Anesthesia Start: 1036 Anesthesia Stop: 1144    Procedure: ENDOSCOPIC RETROGRADE CHOLANGIOPANCREATOGRAPHY WITH SPHINCTEROTOMY, BRUSHINGS, AND STENT PLACEMENT Diagnosis:       Bile duct obstruction      (Bile duct obstruction [K83.1])    Surgeons: Mikhail Malave MD Provider: Hank Nye MD    Anesthesia Type: general ASA Status: 4            Anesthesia Type: general    Vitals  Vitals Value Taken Time   /63 11/24/24 1143   Temp     Pulse 89 11/24/24 1148   Resp 16 11/24/24 1137   SpO2 95 % 11/24/24 1148   Vitals shown include unfiled device data.        Post Anesthesia Care and Evaluation    Patient location during evaluation: PHASE II  Patient participation: complete - patient participated  Level of consciousness: awake and alert  Pain management: adequate    Airway patency: patent  Anesthetic complications: No anesthetic complications  PONV Status: none  Cardiovascular status: acceptable and hemodynamically stable  Respiratory status: acceptable, nonlabored ventilation and spontaneous ventilation  Hydration status: acceptable

## 2024-11-24 NOTE — OP NOTE
ENDOSCOPIC RETROGRADE CHOLANGIOPANCREATOGRAPHY Procedure Report    Patient Name:  Chantal Kumar  YOB: 1950    Date of Surgery:  11/24/2024     Pre-Op Diagnosis:  Bile duct obstruction [K83.1]        Procedure/CPT® Codes:      Procedure(s):  ENDOSCOPIC RETROGRADE CHOLANGIOPANCREATOGRAPHY WITH SPHINCTEROTOMY, BRUSHINGS, AND STENT PLACEMENT    Staff:  Surgeon(s):  Mikhail Malave MD      Anesthesia: Monitored Anesthesia Care with Regional    Description of Procedure:  A description of the procedure as well as risks, benefits and alternative methods were explained to the patient who voiced understanding and signed the corresponding consent form.Specifically risks of post-ERCP pancreatitis, bleeding, perforation, failure to canulate and adverse reaction to sedation were discussed. A physical exam was performed and vital signs were monitored throughout the procedure.    A  film was performed which was normal. With the patient in the semi-prone position, an Olympus side viewing endoscope was placed into the mouth and proceeded through the esophagus, stomach and second portion of the duodenum without difficulty. Limited views of the esophagus and stomach were normal. The ampulla was visualized and appeared normal. A Itegria hydrotome was used to cannulate the ampulla using wire guided technique. Bile was aspirated to ensure this was the duct of interest. Contrast was injected into the bile duct.      The scope was then retroflexed and the fundus was visualized. The procedure was not difficult and there were no immediate complications.    Findings:   Large amount of the food was again noticed in the periumbilical diverticulum with a moderate-sized hiatal he was noticed retained secretion of the stomach.  After cleaning the diverticulum from the food residual, diverticulum was completely clean and suctioned and ampulla was noticed at the floor on the right REM located inferiorly facing  leftward, using a adriana tome with a 0.25 wire selective cannulation was attempted which went into initially false tract subsequently dye was injected and opacification of the common bile duct was obtained wire was directed to versus common bile duct stricture was noticed just at the mid part of the common bile duct close to 2 cm with a markedly dilated rest of the common bile duct common hepatic duct and intrahepatic ducts.  At this stage sphincterotomy was performed around 11 to 12 o'clock position subsequent to that, brushing cytology of the stricture was performed aggressively time to followed by 10 Cambodian 9 cm stent placement.  Patient Toller procedure very well no immediate complication was noticed.  Pancreas duct was not cannulated during this procedure.    Impression:  1.  Successful ERCP with 2 cm stricture at the mid common bile duct likely malignant aggressive brushing cytology was performed using two brush, brush tips were sent to cytology/pathology lab.  10 Cambodian 9 cm stent was placed across the stricture with excellent drainage.  2.  Large periampullary diverticulum with the ampulla located at the inferior part of right rim of the diverticulum.  3.  Moderate to large size hiatal hernia.    Recommendations:  Follow the liver function test.  Follow-up with the brushing cytology.  If the brushing cytology turns out to be negative, we will perform EUS guided fine-needle biopsy of the pancreatic mass.  Surgical oncology as well as oncology consultation will be obtained once brushing cytology is positive.  Start full liquid diet and advance to low-fat low residual diet.  Please cancel interventional radiology consultation for tomorrow      Mikhail Malave MD     Date: 11/24/2024    Time: 11:58 EST

## 2024-11-24 NOTE — ANESTHESIA PROCEDURE NOTES
Airway  Urgency: elective    Date/Time: 11/24/2024 10:49 AM  Airway not difficult    General Information and Staff    Patient location during procedure: OR  Anesthesiologist: Hank Nye MD    Indications and Patient Condition  Indications for airway management: airway protection    Preoxygenated: yes  MILS maintained throughout  Mask difficulty assessment: 1 - vent by mask    Final Airway Details  Final airway type: endotracheal airway      Successful airway: ETT  Cuffed: yes   Successful intubation technique: direct laryngoscopy  Endotracheal tube insertion site: oral  Blade: Ghassan  Blade size: 3  ETT size (mm): 7.0  Cormack-Lehane Classification: grade I - full view of glottis  Placement verified by: chest auscultation and capnometry   Measured from: lips  ETT/EBT  to lips (cm): 21  Number of attempts at approach: 1  Assessment: lips, teeth, and gum same as pre-op and atraumatic intubation

## 2024-11-25 ENCOUNTER — APPOINTMENT (OUTPATIENT)
Dept: INTERVENTIONAL RADIOLOGY/VASCULAR | Facility: HOSPITAL | Age: 74
DRG: 435 | End: 2024-11-25
Payer: MEDICARE

## 2024-11-25 LAB
BASOPHILS # BLD AUTO: 0.03 10*3/MM3 (ref 0–0.2)
BASOPHILS NFR BLD AUTO: 0.7 % (ref 0–1.5)
DEPRECATED RDW RBC AUTO: 44.8 FL (ref 37–54)
EOSINOPHIL # BLD AUTO: 0.09 10*3/MM3 (ref 0–0.4)
EOSINOPHIL NFR BLD AUTO: 2 % (ref 0.3–6.2)
ERYTHROCYTE [DISTWIDTH] IN BLOOD BY AUTOMATED COUNT: 14 % (ref 12.3–15.4)
GLUCOSE BLDC GLUCOMTR-MCNC: 228 MG/DL (ref 70–130)
GLUCOSE BLDC GLUCOMTR-MCNC: 306 MG/DL (ref 70–130)
GLUCOSE BLDC GLUCOMTR-MCNC: 331 MG/DL (ref 70–130)
GLUCOSE BLDC GLUCOMTR-MCNC: 373 MG/DL (ref 70–130)
HCT VFR BLD AUTO: 33.9 % (ref 34–46.6)
HGB BLD-MCNC: 9.9 G/DL (ref 12–15.9)
IMM GRANULOCYTES # BLD AUTO: 0.02 10*3/MM3 (ref 0–0.05)
IMM GRANULOCYTES NFR BLD AUTO: 0.4 % (ref 0–0.5)
LYMPHOCYTES # BLD AUTO: 0.94 10*3/MM3 (ref 0.7–3.1)
LYMPHOCYTES NFR BLD AUTO: 20.9 % (ref 19.6–45.3)
MCH RBC QN AUTO: 25.7 PG (ref 26.6–33)
MCHC RBC AUTO-ENTMCNC: 29.2 G/DL (ref 31.5–35.7)
MCV RBC AUTO: 88.1 FL (ref 79–97)
MONOCYTES # BLD AUTO: 0.31 10*3/MM3 (ref 0.1–0.9)
MONOCYTES NFR BLD AUTO: 6.9 % (ref 5–12)
NEUTROPHILS NFR BLD AUTO: 3.1 10*3/MM3 (ref 1.7–7)
NEUTROPHILS NFR BLD AUTO: 69.1 % (ref 42.7–76)
NRBC BLD AUTO-RTO: 0 /100 WBC (ref 0–0.2)
PLATELET # BLD AUTO: 148 10*3/MM3 (ref 140–450)
PMV BLD AUTO: 10.3 FL (ref 6–12)
RBC # BLD AUTO: 3.85 10*6/MM3 (ref 3.77–5.28)
WBC NRBC COR # BLD AUTO: 4.49 10*3/MM3 (ref 3.4–10.8)

## 2024-11-25 PROCEDURE — 63710000001 INSULIN GLARGINE PER 5 UNITS: Performed by: HOSPITALIST

## 2024-11-25 PROCEDURE — 63710000001 INSULIN LISPRO (HUMAN) PER 5 UNITS: Performed by: INTERNAL MEDICINE

## 2024-11-25 PROCEDURE — 85025 COMPLETE CBC W/AUTO DIFF WBC: CPT | Performed by: INTERNAL MEDICINE

## 2024-11-25 PROCEDURE — 82948 REAGENT STRIP/BLOOD GLUCOSE: CPT

## 2024-11-25 RX ADMIN — ASPIRIN 81 MG: 81 TABLET, COATED ORAL at 09:26

## 2024-11-25 RX ADMIN — FERROUS SULFATE TAB 325 MG (65 MG ELEMENTAL FE) 325 MG: 325 (65 FE) TAB at 09:27

## 2024-11-25 RX ADMIN — Medication 1 TABLET: at 09:27

## 2024-11-25 RX ADMIN — BUPROPION HYDROCHLORIDE 150 MG: 150 TABLET, EXTENDED RELEASE ORAL at 09:26

## 2024-11-25 RX ADMIN — TRAMADOL HYDROCHLORIDE 50 MG: 50 TABLET, COATED ORAL at 20:18

## 2024-11-25 RX ADMIN — APIXABAN 5 MG: 5 TABLET, FILM COATED ORAL at 21:57

## 2024-11-25 RX ADMIN — TRAZODONE HYDROCHLORIDE 50 MG: 50 TABLET ORAL at 21:57

## 2024-11-25 RX ADMIN — CILOSTAZOL 100 MG: 100 TABLET ORAL at 09:26

## 2024-11-25 RX ADMIN — INSULIN LISPRO 3 UNITS: 100 INJECTION, SOLUTION INTRAVENOUS; SUBCUTANEOUS at 06:45

## 2024-11-25 RX ADMIN — BACLOFEN 10 MG: 10 TABLET ORAL at 20:18

## 2024-11-25 RX ADMIN — MILNACIPRAN HYDROCHLORIDE 50 MG: 50 TABLET, FILM COATED ORAL at 09:27

## 2024-11-25 RX ADMIN — INSULIN GLARGINE 35 UNITS: 100 INJECTION, SOLUTION SUBCUTANEOUS at 21:57

## 2024-11-25 RX ADMIN — CILOSTAZOL 100 MG: 100 TABLET ORAL at 22:14

## 2024-11-25 RX ADMIN — APIXABAN 5 MG: 5 TABLET, FILM COATED ORAL at 09:27

## 2024-11-25 RX ADMIN — BUSPIRONE HYDROCHLORIDE 15 MG: 15 TABLET ORAL at 20:18

## 2024-11-25 RX ADMIN — Medication 10 ML: at 09:27

## 2024-11-25 RX ADMIN — MILNACIPRAN HYDROCHLORIDE 50 MG: 50 TABLET, FILM COATED ORAL at 20:19

## 2024-11-25 RX ADMIN — BUSPIRONE HYDROCHLORIDE 15 MG: 15 TABLET ORAL at 18:11

## 2024-11-25 RX ADMIN — INSULIN LISPRO 6 UNITS: 100 INJECTION, SOLUTION INTRAVENOUS; SUBCUTANEOUS at 21:57

## 2024-11-25 RX ADMIN — Medication 10 ML: at 20:19

## 2024-11-25 RX ADMIN — BUSPIRONE HYDROCHLORIDE 15 MG: 15 TABLET ORAL at 09:26

## 2024-11-25 RX ADMIN — INSULIN LISPRO 5 UNITS: 100 INJECTION, SOLUTION INTRAVENOUS; SUBCUTANEOUS at 12:28

## 2024-11-25 RX ADMIN — PRAZOSIN HYDROCHLORIDE 1 MG: 2 CAPSULE ORAL at 20:18

## 2024-11-25 RX ADMIN — LEVETIRACETAM 500 MG: 500 TABLET, FILM COATED ORAL at 09:27

## 2024-11-25 RX ADMIN — INSULIN LISPRO 5 UNITS: 100 INJECTION, SOLUTION INTRAVENOUS; SUBCUTANEOUS at 18:11

## 2024-11-25 RX ADMIN — ATORVASTATIN CALCIUM 10 MG: 20 TABLET, FILM COATED ORAL at 09:27

## 2024-11-25 RX ADMIN — QUETIAPINE FUMARATE 50 MG: 100 TABLET ORAL at 21:57

## 2024-11-25 RX ADMIN — DIGOXIN 125 MCG: 125 TABLET ORAL at 12:29

## 2024-11-25 RX ADMIN — TRAMADOL HYDROCHLORIDE 50 MG: 50 TABLET, COATED ORAL at 12:29

## 2024-11-25 RX ADMIN — CHOLESTYRAMINE 4 G: 4 POWDER, FOR SUSPENSION ORAL at 09:27

## 2024-11-25 RX ADMIN — LEVETIRACETAM 500 MG: 500 TABLET, FILM COATED ORAL at 20:17

## 2024-11-25 RX ADMIN — BACLOFEN 10 MG: 10 TABLET ORAL at 09:26

## 2024-11-25 NOTE — PROGRESS NOTES
Name: Chantal Kumar ADMIT: 2024   : 1950  PCP: Sasha Bui MD    MRN: [id number] LOS: 5 days   AGE/SEX: 74 y.o. female  ROOM: [facility]     Subjective   Subjective   Feeling good this AM. No N/V/abd pain. Tolerating FLD. No F/C/NS. No SOA or CP. Voiding well.       Objective   Objective   Vital Signs  Temp:  [96.7 °F (35.9 °C)-98.3 °F (36.8 °C)] 98.3 °F (36.8 °C)  Heart Rate:  [] 80  Resp:  [16-20] 16  BP: ()/(61-73) 126/63  SpO2:  [93 %-97 %] 97 %  on  Flow (L/min) (Oxygen Therapy):  [2-3] 2;   Device (Oxygen Therapy): nasal cannula  Body mass index is 30.02 kg/m².  Physical Exam  Vitals and nursing note reviewed.   Constitutional:       General: She is not in acute distress.     Appearance: She is not ill-appearing, toxic-appearing or diaphoretic.   HENT:      Head: Normocephalic.      Nose: Nose normal.      Mouth/Throat:      Mouth: Mucous membranes are moist.      Pharynx: Oropharynx is clear.   Eyes:      General: No scleral icterus.        Right eye: No discharge.         Left eye: No discharge.      Conjunctiva/sclera: Conjunctivae normal.   Cardiovascular:      Rate and Rhythm: Normal rate and regular rhythm.   Pulmonary:      Effort: Pulmonary effort is normal. No respiratory distress.      Breath sounds: Normal breath sounds. No wheezing or rales.   Abdominal:      General: Bowel sounds are normal. There is no distension.      Palpations: Abdomen is soft.      Tenderness: There is no abdominal tenderness.   Musculoskeletal:      Cervical back: Neck supple.      Comments: S/p bilateral AKA   Skin:     General: Skin is warm and dry.      Capillary Refill: Capillary refill takes less than 2 seconds.      Coloration: Skin is not jaundiced.   Neurological:      General: No focal deficit present.      Mental Status: She is alert and oriented to person, place, and time. Mental status is at baseline.      Cranial Nerves: No cranial nerve deficit.   Psychiatric:         Mood  and Affect: Mood normal.         Behavior: Behavior normal.         Thought Content: Thought content normal.       Results Review     I reviewed the patient's new clinical results.  Results from last 7 days   Lab Units 11/25/24  0518 11/24/24 0628 11/23/24 0626 11/22/24  0742   WBC 10*3/mm3 4.49 5.33 6.52 6.78   HEMOGLOBIN g/dL 9.9* 10.4* 11.7* 12.0   PLATELETS 10*3/mm3 148 167 174 156     Results from last 7 days   Lab Units 11/24/24  0628 11/23/24  1316 11/23/24  0626 11/22/24  1440 11/22/24  0742 11/20/24  1348   SODIUM mmol/L 138  --  139  --  137 137   POTASSIUM mmol/L 3.9 3.8 3.6 3.9 3.2* 4.3   CHLORIDE mmol/L 108*  --  109*  --  109* 106   CO2 mmol/L 21.0*  --  20.3*  --  19.0* 19.0*   BUN mg/dL 9  --  12  --  17 14   CREATININE mg/dL 0.58  --  0.64  --  0.92 0.90   GLUCOSE mg/dL 202*  --  82  --  114* 182*   EGFR mL/min/1.73 95.1  --  92.9  --  65.5 67.2     Results from last 7 days   Lab Units 11/24/24  0628 11/23/24 0626 11/22/24 0742 11/20/24  1348   ALBUMIN g/dL 2.4* 2.1* 2.3* 2.8*   BILIRUBIN mg/dL 1.4* 2.1* 3.4* 5.2*   ALK PHOS U/L 571* 731* 859* 850*   AST (SGOT) U/L 26 40* 72* 111*   ALT (SGPT) U/L 59* 78* 102* 157*     Results from last 7 days   Lab Units 11/24/24  0628 11/23/24 0626 11/22/24  0742 11/20/24  1348   CALCIUM mg/dL 8.3* 8.4* 8.4* 8.8   ALBUMIN g/dL 2.4* 2.1* 2.3* 2.8*   MAGNESIUM mg/dL  --   --  2.4  --      Results from last 7 days   Lab Units 11/20/24  1348   PROCALCITONIN ng/mL 0.12     Glucose   Date/Time Value Ref Range Status   11/25/2024 0536 228 (H) 70 - 130 mg/dL Final   11/24/2024 2153 277 (H) 70 - 130 mg/dL Final   11/24/2024 1654 324 (H) 70 - 130 mg/dL Final   11/24/2024 1235 198 (H) 70 - 130 mg/dL Final   11/24/2024 0610 193 (H) 70 - 130 mg/dL Final   11/23/2024 2024 253 (H) 70 - 130 mg/dL Final   11/23/2024 1631 195 (H) 70 - 130 mg/dL Final       No radiology results for the last day    I have personally reviewed all medications:  Scheduled Medications  apixaban, 5  mg, Oral, Q12H  aspirin, 81 mg, Oral, Daily  atorvastatin, 10 mg, Oral, Daily  baclofen, 10 mg, Oral, BID  buPROPion XL, 150 mg, Oral, Daily  busPIRone, 15 mg, Oral, TID  cholestyramine light, 1 packet, Oral, Daily  cilostazol, 100 mg, Oral, BID  digoxin, 125 mcg, Oral, Daily  ferrous sulfate, 325 mg, Oral, Daily With Breakfast  insulin glargine, 35 Units, Subcutaneous, Nightly  insulin lispro, 2-7 Units, Subcutaneous, 4x Daily AC & at Bedtime  levETIRAcetam, 500 mg, Oral, BID  milnacipran, 50 mg, Oral, Q12H  multivitamin with minerals, 1 tablet, Oral, Daily  prazosin, 1 mg, Oral, Nightly  QUEtiapine, 50 mg, Oral, Nightly  sodium chloride, 10 mL, Intravenous, Q12H  traZODone, 50 mg, Oral, Nightly    Infusions   Diet  Diet: Regular/House, Gastrointestinal; Fat-Restricted; Fluid Consistency: Thin (IDDSI 0)    I have personally reviewed:  [x]  Laboratory   [x]  Microbiology   []  Radiology   []  EKG/Telemetry  []  Cardiology/Vascular   []  Pathology    [x]  Records       Assessment/Plan     Active Hospital Problems    Diagnosis  POA    **Bile duct obstruction [K83.1]  Yes    Persistent atrial fibrillation [I48.19]  Yes    S/P AKA (above knee amputation) bilateral [Z89.611, Z89.612]  Not Applicable    Chronic diastolic CHF (congestive heart failure) [I50.32]  Yes    History of CVA (cerebrovascular accident) [Z86.73]  Not Applicable    Bipolar disorder, unspecified [F31.9]  Yes    Secondary hypertension [I15.9]  Yes    Type 2 diabetes mellitus with diabetic neuropathy, unspecified [E11.40]  Yes      Resolved Hospital Problems   No resolved problems to display.       74 y.o. female admitted with Bile duct obstruction.    Biliary obstruction due to stricture  Pancreatic mass   -ERCP unsuccessful on 11/21, second attempt on 11/24 was successful and stent placed, brushings sent, path pending  - is elevated at 904, GI favors malignancy given appearance of stricture  -tolerating FLD per GI, will advance to regular diet  "today (low-fat)  -LFTs, TBili, and AlkPhos trending down     Chronic osteomyelitis associated with chronic stage 4 sacral decubitus ulcer  -procal nl, CRP elevated  -pictures of wound in chart, the wound does not appear infected by exam on admission  -ID consulted and recommend no antibiotics, f/u outpatient with wound care  -She was seen by wound care here in the hospital and they recommend: \"Cleanse with NS - using q tip placed a piece of opticel into the wound space - apply a 4x4 or 6x6 optifoam by folding and adequately placing in the gluteal cleft assuring the foam portion of the dressing makes contact with skin; change every other day\"     Atrial fibrillation  Chronic AC (Eliquis)  -metoprolol on hold for now as BPs were on the low side  -continue digoxin  -HRs fine so far and BPs improved  -patient has been seen by Cardiology in the hospital  -continue aspirin  -GI okay with restarting Eliquis today--have ordered     PAD  Bilateral AKA  -continue aspirin and Pletal     Seizure history  -continue Keppra  -no seizure activity here     Type 2 DM  -continue Lantus, increase dose as diet restarted and po intake increasing  -continue SSI  -check A1c    Chronic hypoxic resp failure  -stable on 2L/min by NC      Eliquis should suffice for DVT prophylaxis.  Full code.  Discussed with patient.  Anticipate discharge back to SNU facility when cleared by consultants.  Expected Discharge Date: 11/27/2024; Expected Discharge Time:       Deejay Mann MD  [facility]  11/25/24  08:30 EST    "

## 2024-11-25 NOTE — PLAN OF CARE
Goal Outcome Evaluation:  Plan of Care Reviewed With: patient        Progress: no change  Outcome Evaluation: Patient is alert and oriented, VSS, no complaints of N/V/D. Pain managed with Norco x1.Q2 turns. SSI and insulin glargine given. Full liquid diet. Fall precautions maintained. Peterson cath care completed per CNA. Johny AKA. Siderails padded, Keppra given. Speacialty mattress. 2L NC. POC ongoing.

## 2024-11-26 LAB
ALBUMIN SERPL-MCNC: 2.4 G/DL (ref 3.5–5.2)
ALBUMIN/GLOB SERPL: 0.9 G/DL
ALP SERPL-CCNC: 390 U/L (ref 39–117)
ALT SERPL W P-5'-P-CCNC: 37 U/L (ref 1–33)
ANION GAP SERPL CALCULATED.3IONS-SCNC: 9.8 MMOL/L (ref 5–15)
AST SERPL-CCNC: 18 U/L (ref 1–32)
BASOPHILS # BLD AUTO: 0.02 10*3/MM3 (ref 0–0.2)
BASOPHILS NFR BLD AUTO: 0.4 % (ref 0–1.5)
BILIRUB SERPL-MCNC: 1.2 MG/DL (ref 0–1.2)
BUN SERPL-MCNC: 8 MG/DL (ref 8–23)
BUN/CREAT SERPL: 10.8 (ref 7–25)
CALCIUM SPEC-SCNC: 8.1 MG/DL (ref 8.6–10.5)
CHLORIDE SERPL-SCNC: 102 MMOL/L (ref 98–107)
CO2 SERPL-SCNC: 24.2 MMOL/L (ref 22–29)
CREAT SERPL-MCNC: 0.74 MG/DL (ref 0.57–1)
CYTO UR: NORMAL
DEPRECATED RDW RBC AUTO: 45.2 FL (ref 37–54)
EGFRCR SERPLBLD CKD-EPI 2021: 85 ML/MIN/1.73
EOSINOPHIL # BLD AUTO: 0.1 10*3/MM3 (ref 0–0.4)
EOSINOPHIL NFR BLD AUTO: 2 % (ref 0.3–6.2)
ERYTHROCYTE [DISTWIDTH] IN BLOOD BY AUTOMATED COUNT: 13.8 % (ref 12.3–15.4)
GLOBULIN UR ELPH-MCNC: 2.8 GM/DL
GLUCOSE BLDC GLUCOMTR-MCNC: 322 MG/DL (ref 70–130)
GLUCOSE BLDC GLUCOMTR-MCNC: 347 MG/DL (ref 70–130)
GLUCOSE BLDC GLUCOMTR-MCNC: 370 MG/DL (ref 70–130)
GLUCOSE BLDC GLUCOMTR-MCNC: 391 MG/DL (ref 70–130)
GLUCOSE BLDC GLUCOMTR-MCNC: 415 MG/DL (ref 70–130)
GLUCOSE SERPL-MCNC: 347 MG/DL (ref 65–99)
HBA1C MFR BLD: 8.2 % (ref 4.8–5.6)
HCT VFR BLD AUTO: 35.3 % (ref 34–46.6)
HGB BLD-MCNC: 10.3 G/DL (ref 12–15.9)
IMM GRANULOCYTES # BLD AUTO: 0.01 10*3/MM3 (ref 0–0.05)
IMM GRANULOCYTES NFR BLD AUTO: 0.2 % (ref 0–0.5)
LAB AP CASE REPORT: NORMAL
LAB AP INTRADEPARTMENTAL CONSULT: NORMAL
LYMPHOCYTES # BLD AUTO: 0.98 10*3/MM3 (ref 0.7–3.1)
LYMPHOCYTES NFR BLD AUTO: 19.4 % (ref 19.6–45.3)
MAGNESIUM SERPL-MCNC: 2 MG/DL (ref 1.6–2.4)
MCH RBC QN AUTO: 25.8 PG (ref 26.6–33)
MCHC RBC AUTO-ENTMCNC: 29.2 G/DL (ref 31.5–35.7)
MCV RBC AUTO: 88.5 FL (ref 79–97)
MONOCYTES # BLD AUTO: 0.37 10*3/MM3 (ref 0.1–0.9)
MONOCYTES NFR BLD AUTO: 7.3 % (ref 5–12)
NEUTROPHILS NFR BLD AUTO: 3.57 10*3/MM3 (ref 1.7–7)
NEUTROPHILS NFR BLD AUTO: 70.7 % (ref 42.7–76)
NRBC BLD AUTO-RTO: 0 /100 WBC (ref 0–0.2)
PATH REPORT.FINAL DX SPEC: NORMAL
PATH REPORT.GROSS SPEC: NORMAL
PHOSPHATE SERPL-MCNC: 1.9 MG/DL (ref 2.5–4.5)
PHOSPHATE SERPL-MCNC: 2 MG/DL (ref 2.5–4.5)
PLATELET # BLD AUTO: 137 10*3/MM3 (ref 140–450)
PMV BLD AUTO: 9.6 FL (ref 6–12)
POTASSIUM SERPL-SCNC: 3.7 MMOL/L (ref 3.5–5.2)
PROT SERPL-MCNC: 5.2 G/DL (ref 6–8.5)
RBC # BLD AUTO: 3.99 10*6/MM3 (ref 3.77–5.28)
SODIUM SERPL-SCNC: 136 MMOL/L (ref 136–145)
WBC NRBC COR # BLD AUTO: 5.05 10*3/MM3 (ref 3.4–10.8)

## 2024-11-26 PROCEDURE — 85025 COMPLETE CBC W/AUTO DIFF WBC: CPT | Performed by: INTERNAL MEDICINE

## 2024-11-26 PROCEDURE — 83735 ASSAY OF MAGNESIUM: CPT | Performed by: HOSPITALIST

## 2024-11-26 PROCEDURE — 84100 ASSAY OF PHOSPHORUS: CPT | Performed by: HOSPITALIST

## 2024-11-26 PROCEDURE — 80053 COMPREHEN METABOLIC PANEL: CPT | Performed by: HOSPITALIST

## 2024-11-26 PROCEDURE — 63710000001 INSULIN LISPRO (HUMAN) PER 5 UNITS: Performed by: INTERNAL MEDICINE

## 2024-11-26 PROCEDURE — 25810000003 SODIUM CHLORIDE 0.9 % SOLUTION: Performed by: HOSPITALIST

## 2024-11-26 PROCEDURE — 82948 REAGENT STRIP/BLOOD GLUCOSE: CPT

## 2024-11-26 PROCEDURE — 63710000001 INSULIN LISPRO (HUMAN) PER 5 UNITS: Performed by: HOSPITALIST

## 2024-11-26 PROCEDURE — 83036 HEMOGLOBIN GLYCOSYLATED A1C: CPT | Performed by: HOSPITALIST

## 2024-11-26 PROCEDURE — 63710000001 INSULIN GLARGINE PER 5 UNITS: Performed by: HOSPITALIST

## 2024-11-26 RX ORDER — FENTANYL/ROPIVACAINE/NS/PF 2-625MCG/1
15 PLASTIC BAG, INJECTION (ML) EPIDURAL ONCE
Status: COMPLETED | OUTPATIENT
Start: 2024-11-26 | End: 2024-11-27

## 2024-11-26 RX ORDER — HYDROCODONE BITARTRATE AND ACETAMINOPHEN 5; 325 MG/1; MG/1
1 TABLET ORAL EVERY 6 HOURS PRN
Status: DISCONTINUED | OUTPATIENT
Start: 2024-11-26 | End: 2024-11-28

## 2024-11-26 RX ORDER — INSULIN LISPRO 100 [IU]/ML
5 INJECTION, SOLUTION INTRAVENOUS; SUBCUTANEOUS ONCE
Status: COMPLETED | OUTPATIENT
Start: 2024-11-26 | End: 2024-11-26

## 2024-11-26 RX ORDER — CETIRIZINE HYDROCHLORIDE 10 MG/1
10 TABLET ORAL DAILY
Status: DISCONTINUED | OUTPATIENT
Start: 2024-11-26 | End: 2024-12-01 | Stop reason: HOSPADM

## 2024-11-26 RX ADMIN — BACLOFEN 10 MG: 10 TABLET ORAL at 20:05

## 2024-11-26 RX ADMIN — APIXABAN 5 MG: 5 TABLET, FILM COATED ORAL at 09:48

## 2024-11-26 RX ADMIN — MILNACIPRAN HYDROCHLORIDE 50 MG: 50 TABLET, FILM COATED ORAL at 20:05

## 2024-11-26 RX ADMIN — FERROUS SULFATE TAB 325 MG (65 MG ELEMENTAL FE) 325 MG: 325 (65 FE) TAB at 09:47

## 2024-11-26 RX ADMIN — TRAMADOL HYDROCHLORIDE 50 MG: 50 TABLET, COATED ORAL at 20:10

## 2024-11-26 RX ADMIN — CHOLESTYRAMINE 4 G: 4 POWDER, FOR SUSPENSION ORAL at 09:49

## 2024-11-26 RX ADMIN — TRAZODONE HYDROCHLORIDE 50 MG: 50 TABLET ORAL at 20:05

## 2024-11-26 RX ADMIN — ATORVASTATIN CALCIUM 10 MG: 20 TABLET, FILM COATED ORAL at 09:47

## 2024-11-26 RX ADMIN — LEVETIRACETAM 500 MG: 500 TABLET, FILM COATED ORAL at 20:05

## 2024-11-26 RX ADMIN — CILOSTAZOL 100 MG: 100 TABLET ORAL at 09:48

## 2024-11-26 RX ADMIN — INSULIN LISPRO 6 UNITS: 100 INJECTION, SOLUTION INTRAVENOUS; SUBCUTANEOUS at 17:44

## 2024-11-26 RX ADMIN — TRAMADOL HYDROCHLORIDE 50 MG: 50 TABLET, COATED ORAL at 09:54

## 2024-11-26 RX ADMIN — BUSPIRONE HYDROCHLORIDE 15 MG: 15 TABLET ORAL at 16:39

## 2024-11-26 RX ADMIN — CETIRIZINE HYDROCHLORIDE 10 MG: 10 TABLET, FILM COATED ORAL at 09:54

## 2024-11-26 RX ADMIN — TIZANIDINE 2 MG: 4 TABLET ORAL at 16:39

## 2024-11-26 RX ADMIN — QUETIAPINE FUMARATE 50 MG: 100 TABLET ORAL at 20:05

## 2024-11-26 RX ADMIN — DIGOXIN 125 MCG: 125 TABLET ORAL at 12:23

## 2024-11-26 RX ADMIN — BACLOFEN 10 MG: 10 TABLET ORAL at 09:48

## 2024-11-26 RX ADMIN — LEVETIRACETAM 500 MG: 500 TABLET, FILM COATED ORAL at 09:48

## 2024-11-26 RX ADMIN — APIXABAN 5 MG: 5 TABLET, FILM COATED ORAL at 20:05

## 2024-11-26 RX ADMIN — MILNACIPRAN HYDROCHLORIDE 50 MG: 50 TABLET, FILM COATED ORAL at 09:49

## 2024-11-26 RX ADMIN — INSULIN LISPRO 5 UNITS: 100 INJECTION, SOLUTION INTRAVENOUS; SUBCUTANEOUS at 12:21

## 2024-11-26 RX ADMIN — INSULIN GLARGINE 45 UNITS: 100 INJECTION, SOLUTION SUBCUTANEOUS at 22:02

## 2024-11-26 RX ADMIN — POTASSIUM PHOSPHATE, MONOBASIC POTASSIUM PHOSPHATE, DIBASIC INJECTION, 15 MMOL: 236; 224 SOLUTION, CONCENTRATE INTRAVENOUS at 23:05

## 2024-11-26 RX ADMIN — BUPROPION HYDROCHLORIDE 150 MG: 150 TABLET, EXTENDED RELEASE ORAL at 09:48

## 2024-11-26 RX ADMIN — Medication 2 PACKET: at 12:21

## 2024-11-26 RX ADMIN — BUSPIRONE HYDROCHLORIDE 15 MG: 15 TABLET ORAL at 20:05

## 2024-11-26 RX ADMIN — INSULIN LISPRO 5 UNITS: 100 INJECTION, SOLUTION INTRAVENOUS; SUBCUTANEOUS at 07:55

## 2024-11-26 RX ADMIN — Medication 10 ML: at 20:07

## 2024-11-26 RX ADMIN — ASPIRIN 81 MG: 81 TABLET, COATED ORAL at 09:48

## 2024-11-26 RX ADMIN — INSULIN LISPRO 7 UNITS: 100 INJECTION, SOLUTION INTRAVENOUS; SUBCUTANEOUS at 12:21

## 2024-11-26 RX ADMIN — INSULIN LISPRO 5 UNITS: 100 INJECTION, SOLUTION INTRAVENOUS; SUBCUTANEOUS at 22:02

## 2024-11-26 RX ADMIN — Medication 1 TABLET: at 09:48

## 2024-11-26 RX ADMIN — CILOSTAZOL 100 MG: 100 TABLET ORAL at 20:05

## 2024-11-26 RX ADMIN — BUSPIRONE HYDROCHLORIDE 15 MG: 15 TABLET ORAL at 09:49

## 2024-11-26 RX ADMIN — HYDROCODONE BITARTRATE AND ACETAMINOPHEN 1 TABLET: 5; 325 TABLET ORAL at 17:44

## 2024-11-26 NOTE — PLAN OF CARE
Goal Outcome Evaluation:  Plan of Care Reviewed With: patient        Progress: improving  Outcome Evaluation: Vss, afebrile, rick catheter removed, voiding spontaneously via purewick, saline locked, turning q2 hours, pain managed with Tramadol, tolerating advanced diet, no c/o nausea, monitoring blood sugars, falls and contact precautions maintained.

## 2024-11-26 NOTE — PLAN OF CARE
Goal Outcome Evaluation:           Progress: no change  Outcome Evaluation: VSS. Pt on 2LNC O2 at baseline. Norco ordered by MD to help with abdominal pain. Purewick in place. Q2H turns. ACHS. Insulin given as ordered. MD aware of hyperglycemia today and insulin has been adjusted by him. Mepilex to coccyx, changed yesterday. Tolerating carb control diet. Needs assist with eating. In contact isolation and on seizure precautions and on falls precautions.

## 2024-11-26 NOTE — PROGRESS NOTES
Continued Stay Note  [facility]     Patient Name: Chantal Kumar  MRN: [id number]  Today's Date: 11/26/2024    Admit Date: 11/20/2024    Plan: From/Return to [facility] bed   Discharge Plan       Row Name 11/26/24 1357       Plan    [facility]    Plan Comments Spoke to [facility] and [facility] at [phone] who confirmed patient is able to return at any time. [facility] in [facility] Transportation will need to be arranged                   Discharge Codes    No documentation.                 Expected Discharge Date and Time       Expected Discharge Date Expected Discharge Time    Nov 29, 2024               Katya Tony RN

## 2024-11-26 NOTE — PLAN OF CARE
Goal Outcome Evaluation:  Plan of Care Reviewed With: patient        Progress: improving  Outcome Evaluation: Alert and orineted. VSS, on O2 at 2L/min. Tramadol given for pain. External catheter in place with adequate urine output. Turn Q2H. Saline locked. Blood glucose monitored. Due Insulin given. Slept well.

## 2024-11-27 ENCOUNTER — APPOINTMENT (OUTPATIENT)
Dept: CT IMAGING | Facility: HOSPITAL | Age: 74
DRG: 435 | End: 2024-11-27
Payer: MEDICARE

## 2024-11-27 DIAGNOSIS — R93.5 ABNORMAL FINDINGS ON DIAGNOSTIC IMAGING OF OTHER ABDOMINAL REGIONS, INCLUDING RETROPERITONEUM: ICD-10-CM

## 2024-11-27 DIAGNOSIS — C25.9 PANCREATIC ADENOCARCINOMA: Primary | ICD-10-CM

## 2024-11-27 PROBLEM — C25.7 MALIGNANT NEOPLASM OF OTHER PARTS OF PANCREAS: Status: ACTIVE | Noted: 2024-11-20

## 2024-11-27 PROBLEM — C24.9: Status: ACTIVE | Noted: 2024-11-27

## 2024-11-27 LAB
ALBUMIN SERPL-MCNC: 2.5 G/DL (ref 3.5–5.2)
ALBUMIN/GLOB SERPL: 0.8 G/DL
ALP SERPL-CCNC: 354 U/L (ref 39–117)
ALT SERPL W P-5'-P-CCNC: 33 U/L (ref 1–33)
ANION GAP SERPL CALCULATED.3IONS-SCNC: 7 MMOL/L (ref 5–15)
AST SERPL-CCNC: 14 U/L (ref 1–32)
BILIRUB SERPL-MCNC: 1 MG/DL (ref 0–1.2)
BUN SERPL-MCNC: 10 MG/DL (ref 8–23)
BUN/CREAT SERPL: 14.1 (ref 7–25)
CALCIUM SPEC-SCNC: 8.3 MG/DL (ref 8.6–10.5)
CEA SERPL-MCNC: 7.34 NG/ML
CHLORIDE SERPL-SCNC: 103 MMOL/L (ref 98–107)
CO2 SERPL-SCNC: 22 MMOL/L (ref 22–29)
CREAT SERPL-MCNC: 0.71 MG/DL (ref 0.57–1)
DEPRECATED RDW RBC AUTO: 42.7 FL (ref 37–54)
EGFRCR SERPLBLD CKD-EPI 2021: 89.4 ML/MIN/1.73
ERYTHROCYTE [DISTWIDTH] IN BLOOD BY AUTOMATED COUNT: 13.9 % (ref 12.3–15.4)
FERRITIN SERPL-MCNC: 530 NG/ML (ref 13–150)
FOLATE SERPL-MCNC: >20 NG/ML (ref 4.78–24.2)
GLOBULIN UR ELPH-MCNC: 3.1 GM/DL
GLUCOSE BLDC GLUCOMTR-MCNC: 313 MG/DL (ref 70–130)
GLUCOSE BLDC GLUCOMTR-MCNC: 352 MG/DL (ref 70–130)
GLUCOSE BLDC GLUCOMTR-MCNC: 390 MG/DL (ref 70–130)
GLUCOSE BLDC GLUCOMTR-MCNC: 405 MG/DL (ref 70–130)
GLUCOSE BLDC GLUCOMTR-MCNC: 439 MG/DL (ref 70–130)
GLUCOSE SERPL-MCNC: 338 MG/DL (ref 65–99)
HCT VFR BLD AUTO: 34 % (ref 34–46.6)
HGB BLD-MCNC: 10.8 G/DL (ref 12–15.9)
HGB RETIC QN AUTO: 30.9 PG (ref 29.8–36.1)
IMM RETICS NFR: 9.7 % (ref 3–15.8)
IRON 24H UR-MRATE: 39 MCG/DL (ref 37–145)
IRON SATN MFR SERPL: 20 % (ref 20–50)
MAGNESIUM SERPL-MCNC: 2.4 MG/DL (ref 1.6–2.4)
MCH RBC QN AUTO: 26.9 PG (ref 26.6–33)
MCHC RBC AUTO-ENTMCNC: 31.8 G/DL (ref 31.5–35.7)
MCV RBC AUTO: 84.8 FL (ref 79–97)
PHOSPHATE SERPL-MCNC: 2.6 MG/DL (ref 2.5–4.5)
PLATELET # BLD AUTO: 140 10*3/MM3 (ref 140–450)
PMV BLD AUTO: 9.4 FL (ref 6–12)
POTASSIUM SERPL-SCNC: 4.2 MMOL/L (ref 3.5–5.2)
PROT SERPL-MCNC: 5.6 G/DL (ref 6–8.5)
RBC # BLD AUTO: 4.01 10*6/MM3 (ref 3.77–5.28)
RETICS # AUTO: 0.07 10*6/MM3 (ref 0.02–0.13)
RETICS/RBC NFR AUTO: 1.89 % (ref 0.7–1.9)
SODIUM SERPL-SCNC: 132 MMOL/L (ref 136–145)
TIBC SERPL-MCNC: 195 MCG/DL (ref 298–536)
TRANSFERRIN SERPL-MCNC: 131 MG/DL (ref 200–360)
VIT B12 BLD-MCNC: 727 PG/ML (ref 211–946)
WBC NRBC COR # BLD AUTO: 5.28 10*3/MM3 (ref 3.4–10.8)

## 2024-11-27 PROCEDURE — 82607 VITAMIN B-12: CPT | Performed by: STUDENT IN AN ORGANIZED HEALTH CARE EDUCATION/TRAINING PROGRAM

## 2024-11-27 PROCEDURE — 82378 CARCINOEMBRYONIC ANTIGEN: CPT | Performed by: STUDENT IN AN ORGANIZED HEALTH CARE EDUCATION/TRAINING PROGRAM

## 2024-11-27 PROCEDURE — 83735 ASSAY OF MAGNESIUM: CPT | Performed by: HOSPITALIST

## 2024-11-27 PROCEDURE — 83540 ASSAY OF IRON: CPT | Performed by: STUDENT IN AN ORGANIZED HEALTH CARE EDUCATION/TRAINING PROGRAM

## 2024-11-27 PROCEDURE — 80053 COMPREHEN METABOLIC PANEL: CPT | Performed by: HOSPITALIST

## 2024-11-27 PROCEDURE — 99222 1ST HOSP IP/OBS MODERATE 55: CPT | Performed by: SURGERY

## 2024-11-27 PROCEDURE — 82746 ASSAY OF FOLIC ACID SERUM: CPT | Performed by: STUDENT IN AN ORGANIZED HEALTH CARE EDUCATION/TRAINING PROGRAM

## 2024-11-27 PROCEDURE — 63710000001 INSULIN LISPRO (HUMAN) PER 5 UNITS: Performed by: INTERNAL MEDICINE

## 2024-11-27 PROCEDURE — 84466 ASSAY OF TRANSFERRIN: CPT | Performed by: STUDENT IN AN ORGANIZED HEALTH CARE EDUCATION/TRAINING PROGRAM

## 2024-11-27 PROCEDURE — 82948 REAGENT STRIP/BLOOD GLUCOSE: CPT

## 2024-11-27 PROCEDURE — 25510000001 IOPAMIDOL 61 % SOLUTION: Performed by: HOSPITALIST

## 2024-11-27 PROCEDURE — 85027 COMPLETE CBC AUTOMATED: CPT | Performed by: HOSPITALIST

## 2024-11-27 PROCEDURE — 85046 RETICYTE/HGB CONCENTRATE: CPT | Performed by: STUDENT IN AN ORGANIZED HEALTH CARE EDUCATION/TRAINING PROGRAM

## 2024-11-27 PROCEDURE — 63710000001 INSULIN GLARGINE PER 5 UNITS: Performed by: HOSPITALIST

## 2024-11-27 PROCEDURE — 84100 ASSAY OF PHOSPHORUS: CPT | Performed by: HOSPITALIST

## 2024-11-27 PROCEDURE — 99223 1ST HOSP IP/OBS HIGH 75: CPT | Performed by: STUDENT IN AN ORGANIZED HEALTH CARE EDUCATION/TRAINING PROGRAM

## 2024-11-27 PROCEDURE — 82728 ASSAY OF FERRITIN: CPT | Performed by: STUDENT IN AN ORGANIZED HEALTH CARE EDUCATION/TRAINING PROGRAM

## 2024-11-27 PROCEDURE — 71260 CT THORAX DX C+: CPT

## 2024-11-27 RX ORDER — AMOXICILLIN 250 MG
2 CAPSULE ORAL 2 TIMES DAILY
Status: DISCONTINUED | OUTPATIENT
Start: 2024-11-27 | End: 2024-12-01 | Stop reason: HOSPADM

## 2024-11-27 RX ORDER — HEPARIN SODIUM (PORCINE) LOCK FLUSH IV SOLN 100 UNIT/ML 100 UNIT/ML
5 SOLUTION INTRAVENOUS AS NEEDED
OUTPATIENT
Start: 2024-11-27

## 2024-11-27 RX ORDER — SODIUM CHLORIDE 0.9 % (FLUSH) 0.9 %
20 SYRINGE (ML) INJECTION AS NEEDED
OUTPATIENT
Start: 2024-11-27

## 2024-11-27 RX ORDER — SODIUM CHLORIDE 0.9 % (FLUSH) 0.9 %
10 SYRINGE (ML) INJECTION AS NEEDED
OUTPATIENT
Start: 2024-11-27

## 2024-11-27 RX ORDER — POLYETHYLENE GLYCOL 3350 17 G/17G
17 POWDER, FOR SOLUTION ORAL DAILY
Status: DISCONTINUED | OUTPATIENT
Start: 2024-11-27 | End: 2024-12-01 | Stop reason: HOSPADM

## 2024-11-27 RX ORDER — SODIUM CHLORIDE 0.9 % (FLUSH) 0.9 %
10 SYRINGE (ML) INJECTION EVERY 12 HOURS SCHEDULED
OUTPATIENT
Start: 2024-11-27

## 2024-11-27 RX ORDER — IOPAMIDOL 612 MG/ML
100 INJECTION, SOLUTION INTRAVASCULAR
Status: COMPLETED | OUTPATIENT
Start: 2024-11-27 | End: 2024-11-27

## 2024-11-27 RX ADMIN — INSULIN LISPRO 6 UNITS: 100 INJECTION, SOLUTION INTRAVENOUS; SUBCUTANEOUS at 12:04

## 2024-11-27 RX ADMIN — INSULIN LISPRO 10 UNITS: 100 INJECTION, SOLUTION INTRAVENOUS; SUBCUTANEOUS at 17:33

## 2024-11-27 RX ADMIN — Medication 10 ML: at 20:42

## 2024-11-27 RX ADMIN — CILOSTAZOL 100 MG: 100 TABLET ORAL at 20:42

## 2024-11-27 RX ADMIN — FERROUS SULFATE TAB 325 MG (65 MG ELEMENTAL FE) 325 MG: 325 (65 FE) TAB at 08:56

## 2024-11-27 RX ADMIN — BUSPIRONE HYDROCHLORIDE 15 MG: 15 TABLET ORAL at 16:52

## 2024-11-27 RX ADMIN — ATORVASTATIN CALCIUM 10 MG: 20 TABLET, FILM COATED ORAL at 08:56

## 2024-11-27 RX ADMIN — TIZANIDINE 2 MG: 4 TABLET ORAL at 15:18

## 2024-11-27 RX ADMIN — QUETIAPINE FUMARATE 50 MG: 100 TABLET ORAL at 20:44

## 2024-11-27 RX ADMIN — BUPROPION HYDROCHLORIDE 150 MG: 150 TABLET, EXTENDED RELEASE ORAL at 08:56

## 2024-11-27 RX ADMIN — INSULIN LISPRO 5 UNITS: 100 INJECTION, SOLUTION INTRAVENOUS; SUBCUTANEOUS at 06:39

## 2024-11-27 RX ADMIN — CILOSTAZOL 100 MG: 100 TABLET ORAL at 08:57

## 2024-11-27 RX ADMIN — HYDROCODONE BITARTRATE AND ACETAMINOPHEN 1 TABLET: 5; 325 TABLET ORAL at 20:43

## 2024-11-27 RX ADMIN — BACLOFEN 10 MG: 10 TABLET ORAL at 08:56

## 2024-11-27 RX ADMIN — INSULIN LISPRO 6 UNITS: 100 INJECTION, SOLUTION INTRAVENOUS; SUBCUTANEOUS at 22:00

## 2024-11-27 RX ADMIN — TRAZODONE HYDROCHLORIDE 50 MG: 50 TABLET ORAL at 20:43

## 2024-11-27 RX ADMIN — POLYETHYLENE GLYCOL 3350 17 G: 17 POWDER, FOR SOLUTION ORAL at 09:20

## 2024-11-27 RX ADMIN — TIZANIDINE 2 MG: 4 TABLET ORAL at 23:22

## 2024-11-27 RX ADMIN — IOPAMIDOL 85 ML: 612 INJECTION, SOLUTION INTRAVENOUS at 13:00

## 2024-11-27 RX ADMIN — BUSPIRONE HYDROCHLORIDE 15 MG: 15 TABLET ORAL at 20:43

## 2024-11-27 RX ADMIN — TRAMADOL HYDROCHLORIDE 50 MG: 50 TABLET, COATED ORAL at 09:20

## 2024-11-27 RX ADMIN — BACLOFEN 10 MG: 10 TABLET ORAL at 20:43

## 2024-11-27 RX ADMIN — MILNACIPRAN HYDROCHLORIDE 50 MG: 50 TABLET, FILM COATED ORAL at 08:57

## 2024-11-27 RX ADMIN — HYDROCODONE BITARTRATE AND ACETAMINOPHEN 1 TABLET: 5; 325 TABLET ORAL at 12:05

## 2024-11-27 RX ADMIN — LEVETIRACETAM 500 MG: 500 TABLET, FILM COATED ORAL at 08:56

## 2024-11-27 RX ADMIN — BUSPIRONE HYDROCHLORIDE 15 MG: 15 TABLET ORAL at 08:56

## 2024-11-27 RX ADMIN — ASPIRIN 81 MG: 81 TABLET, COATED ORAL at 08:56

## 2024-11-27 RX ADMIN — TRAMADOL HYDROCHLORIDE 50 MG: 50 TABLET, COATED ORAL at 16:52

## 2024-11-27 RX ADMIN — INSULIN GLARGINE 60 UNITS: 100 INJECTION, SOLUTION SUBCUTANEOUS at 22:00

## 2024-11-27 RX ADMIN — MILNACIPRAN HYDROCHLORIDE 50 MG: 50 TABLET, FILM COATED ORAL at 20:43

## 2024-11-27 RX ADMIN — HYDROCODONE BITARTRATE AND ACETAMINOPHEN 1 TABLET: 5; 325 TABLET ORAL at 05:33

## 2024-11-27 RX ADMIN — LEVETIRACETAM 500 MG: 500 TABLET, FILM COATED ORAL at 20:43

## 2024-11-27 RX ADMIN — PRAZOSIN HYDROCHLORIDE 1 MG: 2 CAPSULE ORAL at 20:43

## 2024-11-27 RX ADMIN — SENNOSIDES AND DOCUSATE SODIUM 2 TABLET: 50; 8.6 TABLET ORAL at 09:20

## 2024-11-27 RX ADMIN — TIZANIDINE 2 MG: 4 TABLET ORAL at 05:41

## 2024-11-27 RX ADMIN — SENNOSIDES AND DOCUSATE SODIUM 2 TABLET: 50; 8.6 TABLET ORAL at 20:43

## 2024-11-27 RX ADMIN — DIGOXIN 125 MCG: 125 TABLET ORAL at 12:04

## 2024-11-27 RX ADMIN — CETIRIZINE HYDROCHLORIDE 10 MG: 10 TABLET, FILM COATED ORAL at 08:56

## 2024-11-27 RX ADMIN — Medication 10 ML: at 09:00

## 2024-11-27 RX ADMIN — Medication 1 TABLET: at 08:55

## 2024-11-27 RX ADMIN — APIXABAN 5 MG: 5 TABLET, FILM COATED ORAL at 08:56

## 2024-11-27 NOTE — PLAN OF CARE
Goal Outcome Evaluation:  Plan of Care Reviewed With: patient        Progress: no change  Outcome Evaluation: Norco given for abdominal pain, no c/o of nausea, Bilateral AKA, q2 turns, mepilex on coccyx, purewick in place, monitoring blood sugars, insulin given per order, contact isolation precautions maintained, 2L O2 NC, Phosphrous replaced per protocol, saline locked, seziure precautions maintained

## 2024-11-27 NOTE — PROGRESS NOTES
Name: Chantal Kumar ADMIT: 2024   : 1950  PCP: Sasha Bui MD    MRN: [id number] LOS: 7 days   AGE/SEX: 74 y.o. female  ROOM: [facility]     Subjective   Subjective   Feeling good again this AM. No N/V/abd pain. Tolerating regular diet. No F/C/NS. No SOA or CP. Voiding well.       Objective   Objective   Vital Signs  Temp:  [97.5 °F (36.4 °C)-99.7 °F (37.6 °C)] 98.3 °F (36.8 °C)  Heart Rate:  [76-97] 88  Resp:  [16-18] 16  BP: ()/(63-76) 107/65  SpO2:  [92 %-95 %] 93 %  on  Flow (L/min) (Oxygen Therapy):  [2] 2;   Device (Oxygen Therapy): nasal cannula  Body mass index is 30.02 kg/m².    (No change in exam today)    Physical Exam  Vitals and nursing note reviewed.   Constitutional:       General: She is not in acute distress.     Appearance: She is not ill-appearing, toxic-appearing or diaphoretic.   HENT:      Head: Normocephalic.      Nose: Nose normal.      Mouth/Throat:      Mouth: Mucous membranes are moist.      Pharynx: Oropharynx is clear.   Eyes:      General: No scleral icterus.        Right eye: No discharge.         Left eye: No discharge.      Conjunctiva/sclera: Conjunctivae normal.   Cardiovascular:      Rate and Rhythm: Normal rate and regular rhythm.   Pulmonary:      Effort: Pulmonary effort is normal. No respiratory distress.      Breath sounds: Normal breath sounds. No wheezing or rales.   Abdominal:      General: Bowel sounds are normal. There is no distension.      Palpations: Abdomen is soft.      Tenderness: There is no abdominal tenderness.   Musculoskeletal:      Cervical back: Neck supple.      Comments: S/p bilateral AKA   Skin:     General: Skin is warm and dry.      Capillary Refill: Capillary refill takes less than 2 seconds.      Coloration: Skin is not jaundiced.   Neurological:      General: No focal deficit present.      Mental Status: She is alert and oriented to person, place, and time. Mental status is at baseline.      Cranial Nerves: No cranial  nerve deficit.   Psychiatric:         Mood and Affect: Mood normal.         Behavior: Behavior normal.         Thought Content: Thought content normal.       Results Review     I reviewed the patient's new clinical results.  Results from last 7 days   Lab Units 11/27/24 0624 11/26/24 0458 11/25/24  0518 11/24/24 0628   WBC 10*3/mm3 5.28 5.05 4.49 5.33   HEMOGLOBIN g/dL 10.8* 10.3* 9.9* 10.4*   PLATELETS 10*3/mm3 140 137* 148 167     Results from last 7 days   Lab Units 11/27/24  0624 11/26/24  0458 11/24/24  0628 11/23/24  1316 11/23/24  0626   SODIUM mmol/L 132* 136 138  --  139   POTASSIUM mmol/L 4.2 3.7 3.9 3.8 3.6   CHLORIDE mmol/L 103 102 108*  --  109*   CO2 mmol/L 22.0 24.2 21.0*  --  20.3*   BUN mg/dL 10 8 9  --  12   CREATININE mg/dL 0.71 0.74 0.58  --  0.64   GLUCOSE mg/dL 338* 347* 202*  --  82   EGFR mL/min/1.73 89.4 85.0 95.1  --  92.9     Results from last 7 days   Lab Units 11/27/24 0624 11/26/24 0458 11/24/24 0628 11/23/24  0626   ALBUMIN g/dL 2.5* 2.4* 2.4* 2.1*   BILIRUBIN mg/dL 1.0 1.2 1.4* 2.1*   ALK PHOS U/L 354* 390* 571* 731*   AST (SGOT) U/L 14 18 26 40*   ALT (SGPT) U/L 33 37* 59* 78*     Results from last 7 days   Lab Units 11/27/24 0624 11/26/24  1848 11/26/24 0458 11/24/24  0628 11/23/24  0626 11/22/24  0742   CALCIUM mg/dL 8.3*  --  8.1* 8.3* 8.4* 8.4*   ALBUMIN g/dL 2.5*  --  2.4* 2.4* 2.1* 2.3*   MAGNESIUM mg/dL 2.4  --  2.0  --   --  2.4   PHOSPHORUS mg/dL 2.6 1.9* 2.0*  --   --   --      Results from last 7 days   Lab Units 11/20/24  1348   PROCALCITONIN ng/mL 0.12     Hemoglobin A1C   Date/Time Value Ref Range Status   11/26/2024 0458 8.20 (H) 4.80 - 5.60 % Final     Glucose   Date/Time Value Ref Range Status   11/27/2024 0620 313 (H) 70 - 130 mg/dL Final   11/26/2024 2155 322 (H) 70 - 130 mg/dL Final   11/26/2024 1726 370 (H) 70 - 130 mg/dL Final   11/26/2024 1336 391 (H) 70 - 130 mg/dL Final   11/26/2024 1200 415 (C) 70 - 130 mg/dL Final   11/26/2024 0619 347 (H) 70 - 130  mg/dL Final   11/25/2024 2143 373 (H) 70 - 130 mg/dL Final       No radiology results for the last day    I have personally reviewed all medications:  Scheduled Medications  apixaban, 5 mg, Oral, Q12H  aspirin, 81 mg, Oral, Daily  atorvastatin, 10 mg, Oral, Daily  baclofen, 10 mg, Oral, BID  buPROPion XL, 150 mg, Oral, Daily  busPIRone, 15 mg, Oral, TID  cetirizine, 10 mg, Oral, Daily  cholestyramine light, 1 packet, Oral, Daily  cilostazol, 100 mg, Oral, BID  digoxin, 125 mcg, Oral, Daily  ferrous sulfate, 325 mg, Oral, Daily With Breakfast  insulin glargine, 45 Units, Subcutaneous, Nightly  insulin lispro, 2-7 Units, Subcutaneous, 4x Daily AC & at Bedtime  levETIRAcetam, 500 mg, Oral, BID  milnacipran, 50 mg, Oral, Q12H  multivitamin with minerals, 1 tablet, Oral, Daily  prazosin, 1 mg, Oral, Nightly  QUEtiapine, 50 mg, Oral, Nightly  sodium chloride, 10 mL, Intravenous, Q12H  traZODone, 50 mg, Oral, Nightly    Infusions   Diet  Diet: Regular/House, Gastrointestinal, Diabetic; Consistent Carbohydrate; Fat-Restricted; Fluid Consistency: Thin (IDDSI 0)    I have personally reviewed:  [x]  Laboratory   []  Microbiology   []  Radiology   []  EKG/Telemetry  []  Cardiology/Vascular   []  Pathology    []  Records       Assessment/Plan     Active Hospital Problems    Diagnosis  POA    **Bile duct obstruction [K83.1]  Yes    Persistent atrial fibrillation [I48.19]  Yes    S/P AKA (above knee amputation) bilateral [Z89.611, Z89.612]  Not Applicable    Chronic diastolic CHF (congestive heart failure) [I50.32]  Yes    History of CVA (cerebrovascular accident) [Z86.73]  Not Applicable    Bipolar disorder, unspecified [F31.9]  Yes    Secondary hypertension [I15.9]  Yes    Type 2 diabetes mellitus with diabetic neuropathy, unspecified [E11.40]  Yes      Resolved Hospital Problems   No resolved problems to display.       74 y.o. female admitted with biliary obstruction.    Biliary obstruction due to stricture  Pancreatic mass  "  -ERCP unsuccessful on 11/21, second attempt on 11/24 was successful and stent placed  - is elevated at 904  -path showed adenoCA  -have asked GI to revisit and placed consult for Heme/Onc to see  -tolerating regular diet  -LFTs, TBili, and AlkPhos all trending down     Chronic osteomyelitis associated with chronic stage 4 sacral decubitus ulcer  -procal nl, CRP elevated  -pictures of wound in chart, the wound does not appear infected by exam on admission  -ID consulted and recommend no antibiotics, f/u outpatient with wound care  -She was seen by wound care here in the hospital and they recommend: \"Cleanse with NS - using q tip placed a piece of opticel into the wound space - apply a 4x4 or 6x6 optifoam by folding and adequately placing in the gluteal cleft assuring the foam portion of the dressing makes contact with skin; change every other day\"     Atrial fibrillation  Chronic AC (Eliquis)  -metoprolol on hold for now as BPs were on the low side  -continue digoxin  -HRs fine so far and BPs somewhat improved  -patient has been seen by Cardiology in the hospital  -continue aspirin  -GI okay with restarting Eliquis 11/25     PAD  Bilateral AKA  -continue aspirin and Pletal     Seizure history  -continue Keppra  -no seizure activity here     Type 2 DM  -sugars high as po intake increasing  -increase Lantus to 60 units tonight  -continue SSI  -A1c 8.2    Chronic hypoxic resp failure  -stable on 2L/min by NC    Hypophosphatemia  -replaced with protocol    Itching ears  -trial of Zyrtec      Eliquis should suffice for DVT prophylaxis.  Full code.  Discussed with patient, RN, and GI provider.  Anticipate discharge back to SNU facility when cleared by consultants.  Expected Discharge Date: 11/29/2024; Expected Discharge Time:       Deejay Mann MD  [facility]  11/27/24  08:42 EST    "

## 2024-11-27 NOTE — CONSULTS
[facility] INITIAL INPATIENT CONSULTATION NOTE    REASON FOR CONSULTATION:    Adenocarcinoma       HISTORY OF PRESENT ILLNESS:  Chantal Kumar is a 74 y.o. female who we are asked to see today in consultation for newly diagnosed biliary adenocarcinoma.    Past medical history of CHF, COPD, diabetes type 2, hypertension, hyperlipidemia, bilateral above-knee amputations.  Presented with abdominal discomfort.    Labs upon admission notable for total bilirubin 5.2.  GI was consulted,    An ERCP was done on 11/21/2024 by Dr. Angel Castaneda.  Limited view of the esophagus stomach and small bowel normal.  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  Consultation to interventional radiology for PTC recommended.  A CT abdomen obtained on 11/22/2024 showed a 2.9 cm hypoenhancing pancreatic head mass concerning for pancreatic adenocarcinoma.  Mass encases and severely narrows main portal vein at the portal venous confluence.  Mass causes moderate intra and extrahepatic biliary ductal dilatation.  No abdominal lymphadenopathy.  No abdominal omental or peritoneal nodularity.  On 11/24/2024 she underwent ERCP with sphincterotomy, brushing and stent placement by Dr. Malave.  IR consult was then canceled.  Brushings of the CBD revealed atypical ductal cells Suspicious for well-differentiated adenocarcinoma    Patient reports she lives in a nursing home.  She [place] miles from [place].      Past Medical History:   Diagnosis Date    Arthritis     Bipolar disorder, unspecified     Borderline personality disorder     CHF (congestive heart failure)     Chronic pain     COPD (chronic obstructive pulmonary disease)     Depression     Diabetes mellitus     Dysphagia     Generalized anxiety disorder     GERD (gastroesophageal reflux disease)      Hyperlipidemia     Hypertension     Migraine     Nightmare disorder     Osteoporosis     Pressure ulcer of unspecified site, stage 4     Seizures     Stroke     Type 2 diabetes mellitus with diabetic neuropathy, unspecified     Unspecified atrial fibrillation        Past Surgical History:   Procedure Laterality Date    ABOVE KNEE AMPUTATION Bilateral 1/25/2022    Procedure: Bilateral above the knee amputation;  Surgeon: Herber Rodriguez MD;  Location: [facility] OR;  Service: Vascular;  Laterality: Bilateral;    ERCP N/A 11/21/2024    Procedure: ENDOSCOPIC RETROGRADE CHOLANGIOPANCREATOGRAPHY with sphincterotomy;  Surgeon: Angel Castaneda MD;  Location: [facility] ENDOSCOPY;  Service: Gastroenterology;  Laterality: N/A;  pre: bile duct obstruction  post: periampula diveritculum    ERCP N/A 11/24/2024    Procedure: ENDOSCOPIC RETROGRADE CHOLANGIOPANCREATOGRAPHY WITH SPHINCTEROTOMY, BRUSHINGS, AND STENT PLACEMENT;  Surgeon: Mikhail Malave MD;  Location: [facility] ENDOSCOPY;  Service: Gastroenterology;  Laterality: N/A;  PRE- BILE DUCT OBSTRUCTION  POST- DISTAL BILE DUCT STRICTURE       SOCIAL HISTORY:   reports that she has never smoked. She has never been exposed to tobacco smoke. She has never used smokeless tobacco. She reports that she does not drink alcohol and does not use drugs.    FAMILY HISTORY:  family history is not on file.    ALLERGIES:  No Known Allergies    MEDICATIONS:  As listed in the electronic medical record.    Review of Systems   Constitutional:  Positive for fatigue.   Gastrointestinal:  Positive for abdominal pain (Reports resolved now) and diarrhea (Reports she had diarrhea for about 3 weeks prior to admission, resolved now).   Genitourinary: Negative.    Skin:  Positive for wound (Pain at sacral decub).   Hematological: Negative.        Vitals:    11/26/24 2258 11/27/24 0622 11/27/24 0913 11/27/24 1207   BP: 99/63 107/65 117/63    BP Location:  Right arm Right arm    Patient Position:  Lying Lying     Pulse: 80 88 77 88   Resp:  16 16    Temp:  98.3 °F (36.8 °C) 97.6 °F (36.4 °C)    TempSrc:  Oral Oral    SpO2:  93% 94%    Weight:           Physical Exam  Constitutional:       General: She is not in acute distress.     Appearance: She is obese.   HENT:      Head: Normocephalic and atraumatic.   Cardiovascular:      Rate and Rhythm: Regular rhythm.      Heart sounds: No murmur heard.  Pulmonary:      Effort: No respiratory distress.      Breath sounds: Normal breath sounds.   Musculoskeletal:      Comments: S/p b/l AKA   Neurological:      General: No focal deficit present.      Mental Status: She is alert and oriented to person, place, and time.         DIAGNOSTIC DATA:    Results from last 7 days   Lab Units 11/27/24 0624 11/26/24 0458 11/25/24  0518   WBC 10*3/mm3 5.28 5.05 4.49   HEMOGLOBIN g/dL 10.8* 10.3* 9.9*   HEMATOCRIT % 34.0 35.3 33.9*   PLATELETS 10*3/mm3 140 137* 148      Results from last 7 days   Lab Units 11/27/24 0624 11/26/24 0458 11/24/24  0628   SODIUM mmol/L 132* 136 138   POTASSIUM mmol/L 4.2 3.7 3.9   CHLORIDE mmol/L 103 102 108*   CO2 mmol/L 22.0 24.2 21.0*   BUN mg/dL 10 8 9   CREATININE mg/dL 0.71 0.74 0.58   CALCIUM mg/dL 8.3* 8.1* 8.3*   BILIRUBIN mg/dL 1.0 1.2 1.4*   ALK PHOS U/L 354* 390* 571*   ALT (SGPT) U/L 33 37* 59*   AST (SGOT) U/L 14 18 26   GLUCOSE mg/dL 338* 347* 202*          IMAGING:    CT Abdomen With & Without Contrast (11/22/2024 19:43)   CT Chest With Contrast Diagnostic (11/27/2024 13:00)     Assessment & Plan   ASSESSMENT:  This is a 74 y.o. female with:    *Adenocarcinoma, likely pancreatic  *Multiple bilateral pulmonary nodules, subcentimeter  *Poor performance status, ECOG 3-most likely secondary to bilateral AKA and other medical comorbidities  Presented with abdominal discomfort, found to have total bilirubin elevated at 5.2  11/21/2022 ERCP with unsuccessful cannulization.  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  11/21/2024 Ca 19-9 -  904   11/22/2024-CT abdomen-a 2.9 cm hypoenhancing pancreatic head mass concerning for pancreatic adenocarcinoma.  Mass encases and severely narrows main portal vein at the portal venous confluence.  Mass causes moderate intra and extrahepatic biliary ductal dilatation.  No abdominal lymphadenopathy.  No abdominal omental or peritoneal nodularity.   11/24/2024 ERCP with sphincterotomy, brushing and stent placement by Dr. Malave.  IR consult was then canceled.    11/24/2024 brushings of the CBD revealed atypical ductal cells Suspicious for well-differentiated adenocarcinoma  11/27/2024 CT chest-multiple right greater than left bilateral solid subcentimeter pulmonary nodules measuring up to 8 mm.  Pulmonary metastatic disease cannot be excluded.  11/27/2024-I have reviewed the imaging, pathology and discussed the diagnosis and management with the patient.  She has a 2.9 cm hypoenhancing pancreatic head mass, elevated CA 19-9, and CBD brushings suspicious for well-differentiated adenocarcinoma.  She likely has pancreatic adenocarcinoma.  I have discussed next steps in management-obtaining a PET scan as an outpatient to complete staging; and referral to surgical oncology at [facility] for surgical management opinion.  Unfortunately, patient is a nursing home resident.  She does have poor performance status mainly due to her medical comorbidities and bilateral AKA.  She seems to be a poor surgical candidate due to her medical comorbidities.  Would likely consider palliative chemo with Gemzar dose reduced to 800 mg/m² day 1 and day 15 of a 28-day cycle for better tolerance.  Reviewed possible side effects of chemotherapy including but not limited to cytopenia, febrile neutropenia, nausea, vomiting, hair loss.    *Normocytic anemia, chronic  Upon lab review she has had anemia since at least January 2022 with hemoglobin around 10.  11/27/2024 hemoglobin 10.8.  Will obtain lab eval    *Chronic osteomyelitis with chronic stage IV  sacral decub ulcer  No antibiotics recommended per infectious disease.    *Other medical comorbidities-diabetes type 2, hypertension, hyperlipidemia, bilateral above-knee amputations, A-fib on Eliquis    RECOMMENDATIONS/PLAN:   Reviewed diagnosis and management with the patient in detail  Obtain Caris testing with GPStena on the CBD brushing  Obtain lab testing for anemia  Will consider genetic testing/counseling as outpatient  Given her multiple medical comorbidities, and for performance status, likely not surgical candidate.   Extensively discussed with the patient about my concern regarding her tolerance of chemotherapy.  Could consider Gemzar at a reduced dose of 800 mg/m² on day 1 and day 15 of a 28-day cycle for better tolerance.  Reviewed possible side effects of chemotherapy.  Patient willing to try chemotherapy.  She would need port for chemo.  Referral to general surgery placed  She will be given chemo education appointment; and appointment with me on cycle 1 day 1 Gemzar  Primary team updated.  Case also discussed with my colleague, Dr. Chapo Ramon MD

## 2024-11-27 NOTE — CONSULTS
Colorectal & General Surgery  Consultation    Patient: Chantal Kumar  YOB: 1950  MRN: [id number]      Assessment  Chantal Kumar is a 74 y.o. female with newly diagnosed biliary adenocarcinoma.  She wishes to proceed with systemic chemotherapy after discussing with Dr. Ramon.  I discussed the role of port with her.  We are unable to place a port tomorrow secondary to the holiday, but I have her on the schedule for Friday.  I will go ahead and hold her anticoagulation at this time and make her n.p.o. after midnight on Friday morning.    History of Present Illness   Chantal Kumar is a 74 y.o. female who was recently diagnosed with biliary adenocarcinoma.  She says that she wishes to start chemotherapy.  She has no other complaints today.    Past Medical History   Past Medical History:   Diagnosis Date    Arthritis     Bipolar disorder, unspecified     Borderline personality disorder     CHF (congestive heart failure)     Chronic pain     COPD (chronic obstructive pulmonary disease)     Depression     Diabetes mellitus     Dysphagia     Generalized anxiety disorder     GERD (gastroesophageal reflux disease)     Hyperlipidemia     Hypertension     Migraine     Nightmare disorder     Osteoporosis     Pressure ulcer of unspecified site, stage 4     Seizures     Stroke     Type 2 diabetes mellitus with diabetic neuropathy, unspecified     Unspecified atrial fibrillation         Past Surgical History   Past Surgical History:   Procedure Laterality Date    ABOVE KNEE AMPUTATION Bilateral 1/25/2022    Procedure: Bilateral above the knee amputation;  Surgeon: Herber Rodriguez MD;  Location: [facility] OR;  Service: Vascular;  Laterality: Bilateral;    ERCP N/A 11/21/2024    Procedure: ENDOSCOPIC RETROGRADE CHOLANGIOPANCREATOGRAPHY with sphincterotomy;  Surgeon: Angel Castaneda MD;  Location: [facility] ENDOSCOPY;  Service: Gastroenterology;  Laterality: N/A;  pre: bile duct obstruction  post:  periampula diveritculum    ERCP N/A 11/24/2024    Procedure: ENDOSCOPIC RETROGRADE CHOLANGIOPANCREATOGRAPHY WITH SPHINCTEROTOMY, BRUSHINGS, AND STENT PLACEMENT;  Surgeon: Mikhail Malave MD;  Location: [facility] ENDOSCOPY;  Service: Gastroenterology;  Laterality: N/A;  PRE- BILE DUCT OBSTRUCTION  POST- DISTAL BILE DUCT STRICTURE       Social History  Social History     Socioeconomic History    Marital status: Single   Tobacco Use    Smoking status: Never     Passive exposure: Never    Smokeless tobacco: Never   Vaping Use    Vaping status: Never Used   Substance and Sexual Activity    Alcohol use: Never    Drug use: Never    Sexual activity: Defer       Family History  History reviewed. No pertinent family history.    Colorectal cancer family history: None    Review of Systems  Negative except as documented in the HPI.     Allergies  No Known Allergies    Medications    Current Facility-Administered Medications:     [Held by provider] apixaban (ELIQUIS) tablet 5 mg, 5 mg, Oral, Q12H, Deejay Mann MD, 5 mg at 11/27/24 0856    aspirin EC tablet 81 mg, 81 mg, Oral, Daily, Mikhail Malave MD, 81 mg at 11/27/24 0856    atorvastatin (LIPITOR) tablet 10 mg, 10 mg, Oral, Daily, Mikhail Malave MD, 10 mg at 11/27/24 0856    baclofen (LIORESAL) tablet 10 mg, 10 mg, Oral, BID, Mikhail Malave MD, 10 mg at 11/27/24 0856    buPROPion XL (WELLBUTRIN XL) 24 hr tablet 150 mg, 150 mg, Oral, Daily, Mikhail Malave MD, 150 mg at 11/27/24 0856    busPIRone (BUSPAR) tablet 15 mg, 15 mg, Oral, TID, Mikhail Malave MD, 15 mg at 11/27/24 0856    cetirizine (zyrTEC) tablet 10 mg, 10 mg, Oral, Daily, Deejay Mann MD, 10 mg at 11/27/24 0856    cholestyramine light packet 4 g, 1 packet, Oral, Daily, Mikhail Malave MD, 4 g at 11/26/24 0949    cilostazol (PLETAL) tablet 100 mg, 100 mg, Oral, BID, Mikhail Malave MD, 100 mg at 11/27/24 0857    dextrose (D50W) (25 g/50 mL) IV injection 25 g, 25 g, Intravenous, Q15 Min PRN, Mikhail Malave MD     dextrose (GLUTOSE) oral gel 15 g, 15 g, Oral, Q15 Min PRN, Mikhail Malave MD    digoxin (LANOXIN) tablet 125 mcg, 125 mcg, Oral, Daily, Mikhail Malave MD, 125 mcg at 11/27/24 1204    ferrous sulfate tablet 325 mg, 325 mg, Oral, Daily With Breakfast, Mikhail Malave MD, 325 mg at 11/27/24 0856    glucagon (GLUCAGEN) injection 1 mg, 1 mg, Intramuscular, Q15 Min PRN, Mikhail Malave MD    HYDROcodone-acetaminophen (NORCO) 5-325 MG per tablet 1 tablet, 1 tablet, Oral, Q6H PRN, Deejay Mann MD, 1 tablet at 11/27/24 1205    insulin glargine (LANTUS, SEMGLEE) injection 60 Units, 60 Units, Subcutaneous, Nightly, Deejay Mann MD    insulin lispro (HUMALOG/ADMELOG) injection 2-7 Units, 2-7 Units, Subcutaneous, 4x Daily AC & at Bedtime, Mikhail Malave MD, 6 Units at 11/27/24 1204    levETIRAcetam (KEPPRA) tablet 500 mg, 500 mg, Oral, BID, Mikhail Malave MD, 500 mg at 11/27/24 0856    milnacipran (SAVELLA) tablet 50 mg, 50 mg, Oral, Q12H, Mikhail Malave MD, 50 mg at 11/27/24 0857    multivitamin with minerals 1 tablet, 1 tablet, Oral, Daily, Mikhail Malave MD, 1 tablet at 11/27/24 0855    ondansetron ODT (ZOFRAN-ODT) disintegrating tablet 4 mg, 4 mg, Oral, Q6H PRN **OR** ondansetron (ZOFRAN) injection 4 mg, 4 mg, Intravenous, Q6H PRN, Mikhail Malave MD    Phosphorus Replacement - Follow Nurse / BPA Driven Protocol, , Not Applicable, PRN, Deejay Mann MD    polyethylene glycol (MIRALAX) packet 17 g, 17 g, Oral, Daily, Deejay Mann MD, 17 g at 11/27/24 0920    Potassium Replacement - Follow Nurse / BPA Driven Protocol, , Not Applicable, PRN, Mikhail Malave MD    prazosin (MINIPRESS) capsule 1 mg, 1 mg, Oral, Nightly, Jeramy Briggs MD, 1 mg at 11/25/24 2018    QUEtiapine (SEROquel) tablet 50 mg, 50 mg, Oral, Nightly, Mikhail Malave MD, 50 mg at 11/26/24 2005    sennosides-docusate (PERICOLACE) 8.6-50 MG per tablet 2 tablet, 2 tablet, Oral, BID, Deejay Mann MD, 2 tablet at 11/27/24 0920    sodium chloride 0.9 %  flush 10 mL, 10 mL, Intravenous, Q12H, Mikhail Malave MD, 10 mL at 11/26/24 2007    sodium chloride 0.9 % flush 10 mL, 10 mL, Intravenous, PRN, Mikhail Malave MD    sodium chloride 0.9 % infusion 40 mL, 40 mL, Intravenous, PRN, Mikhail Malave MD    tiZANidine (ZANAFLEX) tablet 2 mg, 2 mg, Oral, Q8H PRN, Mikhail Malave MD, 2 mg at 11/27/24 1518    traMADol (ULTRAM) tablet 50 mg, 50 mg, Oral, Q8H PRN, Mikhail Malave MD, 50 mg at 11/27/24 0920    traZODone (DESYREL) tablet 50 mg, 50 mg, Oral, Nightly, Mikhail Malave MD, 50 mg at 11/26/24 2005    Vital Signs  Vitals:    11/27/24 1207   BP:    Pulse: 88   Resp:    Temp:    SpO2:           Physical Exam  Constitutional: Resting comfortably, no acute distress  Neck: Supple, trachea midline  Respiratory: No increased work of breathing, Symmetric excursion  Cardiovascular: Well pefursed, no jugular venous distention evident   Abdominal:  Soft, non-tender, non-distended  Lymphatics: No cervical or suprascapular adenopathy  Skin: Warm, dry, no rash on visualized skin surfaces  Psychiatric: Alert and oriented ×3, normal affect     Laboratory Results  I have personally reviewed CBC with WBC 5, hemoglobin 10.8, platelets 140.  CMP with creatinine 0.71, albumin 2.5 , ALT 33, total bilirubin 1.0.    Radiology  I have personally reviewed CT scan of the chest demonstrates patent right internal jugular vein.           César Lacy MD  Colorectal & General Surgery  [facility]    [address], [address]  [place], [place], [address]  P: [phone]  F: [phone]

## 2024-11-28 LAB
ALBUMIN SERPL-MCNC: 2.6 G/DL (ref 3.5–5.2)
ALBUMIN/GLOB SERPL: 0.9 G/DL
ALP SERPL-CCNC: 301 U/L (ref 39–117)
ALT SERPL W P-5'-P-CCNC: 28 U/L (ref 1–33)
ANION GAP SERPL CALCULATED.3IONS-SCNC: 5 MMOL/L (ref 5–15)
AST SERPL-CCNC: 17 U/L (ref 1–32)
BILIRUB SERPL-MCNC: 0.9 MG/DL (ref 0–1.2)
BUN SERPL-MCNC: 13 MG/DL (ref 8–23)
BUN/CREAT SERPL: 18.6 (ref 7–25)
CALCIUM SPEC-SCNC: 8.7 MG/DL (ref 8.6–10.5)
CHLORIDE SERPL-SCNC: 104 MMOL/L (ref 98–107)
CO2 SERPL-SCNC: 26 MMOL/L (ref 22–29)
CREAT SERPL-MCNC: 0.7 MG/DL (ref 0.57–1)
DEPRECATED RDW RBC AUTO: 42.8 FL (ref 37–54)
EGFRCR SERPLBLD CKD-EPI 2021: 90.9 ML/MIN/1.73
ERYTHROCYTE [DISTWIDTH] IN BLOOD BY AUTOMATED COUNT: 14 % (ref 12.3–15.4)
GLOBULIN UR ELPH-MCNC: 3 GM/DL
GLUCOSE BLDC GLUCOMTR-MCNC: 261 MG/DL (ref 70–130)
GLUCOSE BLDC GLUCOMTR-MCNC: 284 MG/DL (ref 70–130)
GLUCOSE BLDC GLUCOMTR-MCNC: 309 MG/DL (ref 70–130)
GLUCOSE BLDC GLUCOMTR-MCNC: 364 MG/DL (ref 70–130)
GLUCOSE SERPL-MCNC: 263 MG/DL (ref 65–99)
HCT VFR BLD AUTO: 33.3 % (ref 34–46.6)
HGB BLD-MCNC: 10.7 G/DL (ref 12–15.9)
MAGNESIUM SERPL-MCNC: 2.1 MG/DL (ref 1.6–2.4)
MCH RBC QN AUTO: 27.3 PG (ref 26.6–33)
MCHC RBC AUTO-ENTMCNC: 32.1 G/DL (ref 31.5–35.7)
MCV RBC AUTO: 84.9 FL (ref 79–97)
PHOSPHATE SERPL-MCNC: 2.7 MG/DL (ref 2.5–4.5)
PLATELET # BLD AUTO: 155 10*3/MM3 (ref 140–450)
PMV BLD AUTO: 9.9 FL (ref 6–12)
POTASSIUM SERPL-SCNC: 3.9 MMOL/L (ref 3.5–5.2)
PROT SERPL-MCNC: 5.6 G/DL (ref 6–8.5)
RBC # BLD AUTO: 3.92 10*6/MM3 (ref 3.77–5.28)
SODIUM SERPL-SCNC: 135 MMOL/L (ref 136–145)
WBC NRBC COR # BLD AUTO: 4.12 10*3/MM3 (ref 3.4–10.8)

## 2024-11-28 PROCEDURE — 83735 ASSAY OF MAGNESIUM: CPT | Performed by: HOSPITALIST

## 2024-11-28 PROCEDURE — 63710000001 INSULIN LISPRO (HUMAN) PER 5 UNITS: Performed by: INTERNAL MEDICINE

## 2024-11-28 PROCEDURE — 99232 SBSQ HOSP IP/OBS MODERATE 35: CPT | Performed by: SURGERY

## 2024-11-28 PROCEDURE — 84100 ASSAY OF PHOSPHORUS: CPT | Performed by: HOSPITALIST

## 2024-11-28 PROCEDURE — 63710000001 INSULIN GLARGINE PER 5 UNITS: Performed by: HOSPITALIST

## 2024-11-28 PROCEDURE — 85027 COMPLETE CBC AUTOMATED: CPT | Performed by: HOSPITALIST

## 2024-11-28 PROCEDURE — 80053 COMPREHEN METABOLIC PANEL: CPT | Performed by: HOSPITALIST

## 2024-11-28 PROCEDURE — 82948 REAGENT STRIP/BLOOD GLUCOSE: CPT

## 2024-11-28 PROCEDURE — 99232 SBSQ HOSP IP/OBS MODERATE 35: CPT | Performed by: STUDENT IN AN ORGANIZED HEALTH CARE EDUCATION/TRAINING PROGRAM

## 2024-11-28 RX ORDER — HYDROCODONE BITARTRATE AND ACETAMINOPHEN 7.5; 325 MG/1; MG/1
1 TABLET ORAL EVERY 4 HOURS PRN
Status: DISCONTINUED | OUTPATIENT
Start: 2024-11-28 | End: 2024-11-30

## 2024-11-28 RX ADMIN — MILNACIPRAN HYDROCHLORIDE 50 MG: 50 TABLET, FILM COATED ORAL at 20:09

## 2024-11-28 RX ADMIN — ATORVASTATIN CALCIUM 10 MG: 20 TABLET, FILM COATED ORAL at 08:39

## 2024-11-28 RX ADMIN — CILOSTAZOL 100 MG: 100 TABLET ORAL at 08:39

## 2024-11-28 RX ADMIN — QUETIAPINE FUMARATE 50 MG: 100 TABLET ORAL at 20:09

## 2024-11-28 RX ADMIN — BUSPIRONE HYDROCHLORIDE 15 MG: 15 TABLET ORAL at 08:39

## 2024-11-28 RX ADMIN — Medication 10 ML: at 08:46

## 2024-11-28 RX ADMIN — HYDROCODONE BITARTRATE AND ACETAMINOPHEN 1 TABLET: 7.5; 325 TABLET ORAL at 16:20

## 2024-11-28 RX ADMIN — POLYETHYLENE GLYCOL 3350 17 G: 17 POWDER, FOR SOLUTION ORAL at 08:39

## 2024-11-28 RX ADMIN — Medication 1 TABLET: at 08:39

## 2024-11-28 RX ADMIN — BACLOFEN 10 MG: 10 TABLET ORAL at 20:08

## 2024-11-28 RX ADMIN — TRAZODONE HYDROCHLORIDE 50 MG: 50 TABLET ORAL at 20:08

## 2024-11-28 RX ADMIN — CETIRIZINE HYDROCHLORIDE 10 MG: 10 TABLET, FILM COATED ORAL at 08:39

## 2024-11-28 RX ADMIN — BUSPIRONE HYDROCHLORIDE 15 MG: 15 TABLET ORAL at 16:20

## 2024-11-28 RX ADMIN — LEVETIRACETAM 500 MG: 500 TABLET, FILM COATED ORAL at 08:39

## 2024-11-28 RX ADMIN — HYDROCODONE BITARTRATE AND ACETAMINOPHEN 1 TABLET: 5; 325 TABLET ORAL at 12:27

## 2024-11-28 RX ADMIN — INSULIN LISPRO 6 UNITS: 100 INJECTION, SOLUTION INTRAVENOUS; SUBCUTANEOUS at 22:15

## 2024-11-28 RX ADMIN — LEVETIRACETAM 500 MG: 500 TABLET, FILM COATED ORAL at 20:08

## 2024-11-28 RX ADMIN — SENNOSIDES AND DOCUSATE SODIUM 2 TABLET: 50; 8.6 TABLET ORAL at 08:39

## 2024-11-28 RX ADMIN — TRAMADOL HYDROCHLORIDE 50 MG: 50 TABLET, COATED ORAL at 19:37

## 2024-11-28 RX ADMIN — ASPIRIN 81 MG: 81 TABLET, COATED ORAL at 08:39

## 2024-11-28 RX ADMIN — BUSPIRONE HYDROCHLORIDE 15 MG: 15 TABLET ORAL at 20:08

## 2024-11-28 RX ADMIN — FERROUS SULFATE TAB 325 MG (65 MG ELEMENTAL FE) 325 MG: 325 (65 FE) TAB at 08:39

## 2024-11-28 RX ADMIN — CILOSTAZOL 100 MG: 100 TABLET ORAL at 20:09

## 2024-11-28 RX ADMIN — MILNACIPRAN HYDROCHLORIDE 50 MG: 50 TABLET, FILM COATED ORAL at 08:40

## 2024-11-28 RX ADMIN — BUPROPION HYDROCHLORIDE 150 MG: 150 TABLET, EXTENDED RELEASE ORAL at 08:39

## 2024-11-28 RX ADMIN — INSULIN LISPRO 4 UNITS: 100 INJECTION, SOLUTION INTRAVENOUS; SUBCUTANEOUS at 18:02

## 2024-11-28 RX ADMIN — BACLOFEN 10 MG: 10 TABLET ORAL at 08:39

## 2024-11-28 RX ADMIN — CHOLESTYRAMINE 4 G: 4 POWDER, FOR SUSPENSION ORAL at 08:39

## 2024-11-28 RX ADMIN — HYDROCODONE BITARTRATE AND ACETAMINOPHEN 1 TABLET: 7.5; 325 TABLET ORAL at 22:16

## 2024-11-28 RX ADMIN — SENNOSIDES AND DOCUSATE SODIUM 2 TABLET: 50; 8.6 TABLET ORAL at 20:09

## 2024-11-28 RX ADMIN — PRAZOSIN HYDROCHLORIDE 1 MG: 2 CAPSULE ORAL at 20:09

## 2024-11-28 RX ADMIN — INSULIN LISPRO 5 UNITS: 100 INJECTION, SOLUTION INTRAVENOUS; SUBCUTANEOUS at 12:24

## 2024-11-28 RX ADMIN — DIGOXIN 125 MCG: 125 TABLET ORAL at 12:24

## 2024-11-28 RX ADMIN — Medication 10 ML: at 20:10

## 2024-11-28 RX ADMIN — TRAMADOL HYDROCHLORIDE 50 MG: 50 TABLET, COATED ORAL at 01:36

## 2024-11-28 RX ADMIN — INSULIN LISPRO 4 UNITS: 100 INJECTION, SOLUTION INTRAVENOUS; SUBCUTANEOUS at 08:40

## 2024-11-28 RX ADMIN — INSULIN GLARGINE 60 UNITS: 100 INJECTION, SOLUTION SUBCUTANEOUS at 22:15

## 2024-11-28 NOTE — PLAN OF CARE
Goal Outcome Evaluation:  Plan of Care Reviewed With: patient        Progress: no change  Outcome Evaluation: Patient is A&Ox4, VSS, afebrile. No complaints of N/V, no BM tonight. Tramadol, Norco and Zanaflex given PRN for pain. 2L NC on, q2 hr turns completed. Pericolace given for BM promotion. Purewick in place. ACHS. Insulin givne per orders. Fall and contact precautions maintained. Consent signed. POC ongoing.

## 2024-11-28 NOTE — PROGRESS NOTES
Name: Chantal Kumar ADMIT: 2024   : 1950  PCP: Sasha Bui MD    MRN: [id number] LOS: 8 days   AGE/SEX: 74 y.o. female  ROOM: [facility]/     Subjective   Subjective   Feeling good again this AM. No N/V/abd pain. Tolerating regular diet. No F/C/NS. No SOA or CP. Voiding well.       Objective   Objective   Vital Signs  Temp:  [97.6 °F (36.4 °C)-98 °F (36.7 °C)] 97.6 °F (36.4 °C)  Heart Rate:  [68-88] 87  Resp:  [16-18] 18  BP: ()/(54-71) 93/54  SpO2:  [91 %-94 %] 91 %  on  Flow (L/min) (Oxygen Therapy):  [2] 2;   Device (Oxygen Therapy): nasal cannula  Body mass index is 30.02 kg/m².    (No change in exam today)    Physical Exam  Vitals and nursing note reviewed.   Constitutional:       General: She is not in acute distress.     Appearance: She is not ill-appearing, toxic-appearing or diaphoretic.   HENT:      Head: Normocephalic.      Nose: Nose normal.      Mouth/Throat:      Mouth: Mucous membranes are moist.      Pharynx: Oropharynx is clear.   Eyes:      General: No scleral icterus.        Right eye: No discharge.         Left eye: No discharge.      Conjunctiva/sclera: Conjunctivae normal.   Cardiovascular:      Rate and Rhythm: Normal rate and regular rhythm.   Pulmonary:      Effort: Pulmonary effort is normal. No respiratory distress.      Breath sounds: Normal breath sounds. No wheezing or rales.   Abdominal:      General: Bowel sounds are normal. There is no distension.      Palpations: Abdomen is soft.      Tenderness: There is no abdominal tenderness.   Musculoskeletal:      Cervical back: Neck supple.      Comments: S/p bilateral AKA   Skin:     General: Skin is warm and dry.      Capillary Refill: Capillary refill takes less than 2 seconds.      Coloration: Skin is not jaundiced.   Neurological:      General: No focal deficit present.      Mental Status: She is alert and oriented to person, place, and time. Mental status is at baseline.      Cranial Nerves: No cranial nerve  deficit.   Psychiatric:         Mood and Affect: Mood normal.         Behavior: Behavior normal.         Thought Content: Thought content normal.       Results Review     I reviewed the patient's new clinical results.  Results from last 7 days   Lab Units 11/27/24  0624 11/26/24 0458 11/25/24  0518 11/24/24 0628   WBC 10*3/mm3 5.28 5.05 4.49 5.33   HEMOGLOBIN g/dL 10.8* 10.3* 9.9* 10.4*   PLATELETS 10*3/mm3 140 137* 148 167     Results from last 7 days   Lab Units 11/28/24  0614 11/27/24  0624 11/26/24 0458 11/24/24 0628   SODIUM mmol/L 135* 132* 136 138   POTASSIUM mmol/L 3.9 4.2 3.7 3.9   CHLORIDE mmol/L 104 103 102 108*   CO2 mmol/L 26.0 22.0 24.2 21.0*   BUN mg/dL 13 10 8 9   CREATININE mg/dL 0.70 0.71 0.74 0.58   GLUCOSE mg/dL 263* 338* 347* 202*   EGFR mL/min/1.73 90.9 89.4 85.0 95.1     Results from last 7 days   Lab Units 11/28/24  0614 11/27/24  0624 11/26/24 0458 11/24/24 0628   ALBUMIN g/dL 2.6* 2.5* 2.4* 2.4*   BILIRUBIN mg/dL 0.9 1.0 1.2 1.4*   ALK PHOS U/L 301* 354* 390* 571*   AST (SGOT) U/L 17 14 18 26   ALT (SGPT) U/L 28 33 37* 59*     Results from last 7 days   Lab Units 11/28/24  0614 11/27/24  0624 11/26/24  1848 11/26/24 0458 11/24/24 0628 11/23/24  0626 11/22/24  0742   CALCIUM mg/dL 8.7 8.3*  --  8.1* 8.3*   < > 8.4*   ALBUMIN g/dL 2.6* 2.5*  --  2.4* 2.4*   < > 2.3*   MAGNESIUM mg/dL 2.1 2.4  --  2.0  --   --  2.4   PHOSPHORUS mg/dL 2.7 2.6 1.9* 2.0*  --   --   --     < > = values in this interval not displayed.           Hemoglobin A1C   Date/Time Value Ref Range Status   11/26/2024 0458 8.20 (H) 4.80 - 5.60 % Final     Glucose   Date/Time Value Ref Range Status   11/28/2024 0806 261 (H) 70 - 130 mg/dL Final   11/27/2024 2025 390 (H) 70 - 130 mg/dL Final   11/27/2024 1812 405 (C) 70 - 130 mg/dL Final   11/27/2024 1711 439 (C) 70 - 130 mg/dL Final   11/27/2024 1140 352 (H) 70 - 130 mg/dL Final   11/27/2024 0620 313 (H) 70 - 130 mg/dL Final   11/26/2024 2155 322 (H) 70 - 130 mg/dL  Final       CT Chest With Contrast Diagnostic    Result Date: 11/27/2024  Multiple right greater than left bilateral solid subcentimeter pulmonary nodules measuring up to 8 mm will need continued follow-up as pulmonary metastatic disease cannot be excluded. Outside prior imaging of the chest, if available, would be helpful to demonstrate nodule stability.  This report was finalized on 11/27/2024 1:22 PM by Dr. Grady Sandoval M.D on Workstation: [id number]       I have personally reviewed all medications:  Scheduled Medications  [Held by provider] apixaban, 5 mg, Oral, Q12H  aspirin, 81 mg, Oral, Daily  atorvastatin, 10 mg, Oral, Daily  baclofen, 10 mg, Oral, BID  buPROPion XL, 150 mg, Oral, Daily  busPIRone, 15 mg, Oral, TID  cetirizine, 10 mg, Oral, Daily  cholestyramine light, 1 packet, Oral, Daily  cilostazol, 100 mg, Oral, BID  digoxin, 125 mcg, Oral, Daily  ferrous sulfate, 325 mg, Oral, Daily With Breakfast  insulin glargine, 60 Units, Subcutaneous, Nightly  insulin lispro, 2-7 Units, Subcutaneous, 4x Daily AC & at Bedtime  levETIRAcetam, 500 mg, Oral, BID  milnacipran, 50 mg, Oral, Q12H  multivitamin with minerals, 1 tablet, Oral, Daily  polyethylene glycol, 17 g, Oral, Daily  prazosin, 1 mg, Oral, Nightly  QUEtiapine, 50 mg, Oral, Nightly  senna-docusate sodium, 2 tablet, Oral, BID  sodium chloride, 10 mL, Intravenous, Q12H  traZODone, 50 mg, Oral, Nightly    Infusions   Diet  Diet: Regular/House, Gastrointestinal, Diabetic; Consistent Carbohydrate; Fat-Restricted; Fluid Consistency: Thin (IDDSI 0)    I have personally reviewed:  [x]  Laboratory   []  Microbiology   []  Radiology   []  EKG/Telemetry  []  Cardiology/Vascular   [x]  Pathology    []  Records       Assessment/Plan     Active Hospital Problems    Diagnosis  POA    **Bile duct obstruction [K83.1]  Yes    Pancreatic adenocarcinoma [C25.9]  Yes    Malignant neoplasm of other parts of pancreas [C25.7]  Unknown    Persistent atrial fibrillation  "[I48.19]  Yes    S/P AKA (above knee amputation) bilateral [Z89.611, Z89.612]  Not Applicable    Chronic diastolic CHF (congestive heart failure) [I50.32]  Yes    History of CVA (cerebrovascular accident) [Z86.73]  Not Applicable    Bipolar disorder, unspecified [F31.9]  Yes    Secondary hypertension [I15.9]  Yes    Type 2 diabetes mellitus with diabetic neuropathy, unspecified [E11.40]  Yes      Resolved Hospital Problems   No resolved problems to display.       74 y.o. female admitted with biliary obstruction.    Biliary obstruction due to stricture  Pancreatic mass   -ERCP unsuccessful on 11/21, second attempt on 11/24 was successful and stent placed  - elevated at 904  -path showed adenoCA  -GI recommends Heme/Onc here at [facility] and appt with Dr. Carter (Surg Onc) at [facility]  -Heme/Onc has seen and plans chemotx, outpt chemotx education appt on 12/12 with plans to start on 12/19  -Gen Surg consulted for port placement (Friday)  -tolerating regular diet  -LFTs, TBili, and AlkPhos all improved     Chronic osteomyelitis associated with chronic stage 4 sacral decubitus ulcer  -PCT wnl, CRP elevated  -pictures of wound in chart, the wound does not appear infected by exam on admission  -ID consulted and recommend no antibiotics, f/u outpatient with wound care  -She was seen by wound care here in the hospital and they recommend: \"Cleanse with NS - using q tip placed a piece of opticel into the wound space - apply a 4x4 or 6x6 optifoam by folding and adequately placing in the gluteal cleft assuring the foam portion of the dressing makes contact with skin; change every other day\"     Atrial fibrillation  Chronic AC (Eliquis)  -metoprolol on hold for now as BPs were on the low side  -continue digoxin  -HRs fine so far and BPs somewhat improved  -patient has been seen by Cardiology in the hospital  -continue aspirin  -GI okay with restarting Eliquis 11/25--on hold now for port placement tomorrow     PAD  Bilateral " AKA  -continue aspirin and Pletal     Seizure history  -continue Keppra  -no seizure activity here     Type 2 DM  -sugars high as po intake increasing  -increased Lantus to 60 units last night  -continue SSI  -A1c 8.2    Chronic hypoxic resp failure  -stable on 2L/min by NC    Hypophosphatemia  -replaced with protocol    Itching ears  -trial of Zyrtec      Eliquis should suffice for DVT prophylaxis.  Full code.  Discussed with patient.  Anticipate discharge back to SNU facility on Saturday.  Expected Discharge Date: 11/30/2024; Expected Discharge Time:       Deejay Mann MD  [facility]  11/28/24  08:29 EST

## 2024-11-28 NOTE — PROGRESS NOTES
[facility] GROUP INPATIENT PROGRESS NOTE    Length of Stay:  8 days    CHIEF COMPLAINT/REASON FOR VISIT:  Pancreatic adenocarcinoma  Pulmonary nodules    SUBJECTIVE:   11/28/2024: Patient resting at the time of my visit.  She denies any concerns or complaints.    ROS:  All systems reviewed and found to be negative except for that noted above    OBJECTIVE:  Vitals:    11/27/24 1207 11/27/24 1732 11/27/24 2027 11/28/24 0505   BP:  106/62 135/71 93/54   BP Location:  Right arm Right arm Right arm   Patient Position:  Lying Lying Lying   Pulse: 88 79 68 87   Resp:  18 18 18   Temp:  98 °F (36.7 °C) 97.8 °F (36.6 °C) 97.6 °F (36.4 °C)   TempSrc:  Oral Oral Oral   SpO2:  92% 93% 91%   Weight:             PHYSICAL EXAMINATION:  General: Obese female in no acute distress.  AOx3  Chest/Lungs: CTABL.  No Rales rhonchi wheezing  Heart: S1-S2 normal.  No murmurs  Abdomen/GI: Soft, bowel sounds present  Extremities: Status post bilateral AKA    DIAGNOSTIC DATA:  Results Review:     I reviewed the patient's new clinical results.    Results from last 7 days   Lab Units 11/27/24  0624 11/26/24 0458 11/25/24  0518   WBC 10*3/mm3 5.28 5.05 4.49   HEMOGLOBIN g/dL 10.8* 10.3* 9.9*   HEMATOCRIT % 34.0 35.3 33.9*   PLATELETS 10*3/mm3 140 137* 148      Results from last 7 days   Lab Units 11/28/24  0614 11/27/24 0624 11/26/24  0458   SODIUM mmol/L 135* 132* 136   POTASSIUM mmol/L 3.9 4.2 3.7   CHLORIDE mmol/L 104 103 102   CO2 mmol/L 26.0 22.0 24.2   BUN mg/dL 13 10 8   CREATININE mg/dL 0.70 0.71 0.74   CALCIUM mg/dL 8.7 8.3* 8.1*   BILIRUBIN mg/dL 0.9 1.0 1.2   ALK PHOS U/L 301* 354* 390*   ALT (SGPT) U/L 28 33 37*   AST (SGOT) U/L 17 14 18   GLUCOSE mg/dL 263* 338* 347*      Lab Results   Component Value Date    NEUTROABS 3.57 11/26/2024     Results from last 7 days   Lab Units 11/23/24  0626   INR  1.27*     Results from last 7 days   Lab Units 11/28/24  0614   MAGNESIUM mg/dL 2.1       Assessment & Plan    ASSESSMENT/PLAN:  This is a 74 y.o. female with:     *Adenocarcinoma, likely pancreatic  *Multiple bilateral pulmonary nodules, subcentimeter  *Poor performance status, ECOG 3-most likely secondary to bilateral AKA and other medical comorbidities  Presented with abdominal discomfort, found to have total bilirubin elevated at 5.2  11/21/2022 ERCP with unsuccessful cannulization.  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  11/21/2024 Ca 19-9 - 904   11/22/2024-CT abdomen-a 2.9 cm hypoenhancing pancreatic head mass concerning for pancreatic adenocarcinoma.  Mass encases and severely narrows main portal vein at the portal venous confluence.  Mass causes moderate intra and extrahepatic biliary ductal dilatation.  No abdominal lymphadenopathy.  No abdominal omental or peritoneal nodularity.   11/24/2024 ERCP with sphincterotomy, brushing and stent placement by Dr. Malave.  IR consult was then canceled.    11/24/2024 brushings of the CBD revealed atypical ductal cells Suspicious for well-differentiated adenocarcinoma  11/27/2024 CT chest-multiple right greater than left bilateral solid subcentimeter pulmonary nodules measuring up to 8 mm.  Pulmonary metastatic disease cannot be excluded.  11/27/2024-I have reviewed the imaging, pathology and discussed the diagnosis and management with the patient.  She has a 2.9 cm hypoenhancing pancreatic head mass, elevated CA 19-9, and CBD brushings suspicious for well-differentiated adenocarcinoma.  She likely has pancreatic adenocarcinoma.  I have discussed next steps in management-obtaining a PET scan as an outpatient to complete staging; and referral to surgical oncology at [facility] for surgical management opinion.  Unfortunately, patient is a nursing home resident.  She does have poor performance status mainly due to her medical comorbidities and bilateral AKA.  She seems to be a poor surgical candidate due to her medical comorbidities.  Would likely consider  palliative chemo with Gemzar dose reduced to 800 mg/m² day 1 and day 15 of a 28-day cycle for better tolerance.  Reviewed possible side effects of chemotherapy including but not limited to cytopenia, febrile neutropenia, nausea, vomiting, hair loss.  11/28/2024: Patient is planned for port placement on Friday.  We will need patient's height prior to proceeding with chemotherapy plan.  I have reached out to bedside RN to obtain her height     *Anemia of chronic disease  Upon lab review she has had anemia since at least January 2022 with hemoglobin around 10.  11/27/2024 hemoglobin 10.8.  Lab evaluation consistent with anemia of chronic disease  11/28/2024 CBC pending     *Chronic osteomyelitis with chronic stage IV sacral decub ulcer  No antibiotics recommended per infectious disease.     *Other medical comorbidities-diabetes type 2, hypertension, hyperlipidemia, bilateral above-knee amputations, A-fib on Eliquis     RECOMMENDATIONS/PLAN:   We will obtain Caris testing with [employer] on the CBD brushing  We Will consider genetic testing/counseling as outpatient  We will need patient's height prior to entering chemotherapy plan.  I have reached out to bedside RN.  Patient willing to proceed with single agent Gemzar.  Plan to administer  dose reduced 800 mg/m² day 1 and day 15 of 28-day cycle for tolerance  Dr. Lacy to place a port on Friday.  She will be given chemo education appointment; and appointment with me on cycle 1 day 1 Gemzar  Primary team updated.  CBC with differential daily while inpatient.  We will follow along       Fay Ramon MD

## 2024-11-28 NOTE — PLAN OF CARE
Goal Outcome Evaluation:  Plan of Care Reviewed With: patient        Progress: improving  Outcome Evaluation: Given Norco, Tramadol and Zanaflex for right flank pain, vss, afebrile, 2LO2, turning q2 hours, given Miralax and pericolace for constipation, purewick on, monitoring blood sugar, falls and contact precautions maintained.

## 2024-11-28 NOTE — PROGRESS NOTES
Colorectal & General Surgery  Progress Note    Patient: Chantal Kumar  YOB: 1950  MRN: [id number]      Assessment  Chantal Kumar is a 74 y.o. female with newly diagnosed biliary adenocarcinoma.  Plan for port placement tomorrow.  We discussed the risk, benefits, alternatives the procedure.  N.p.o. after midnight.  Informed consent obtained.    Subjective  No significant events.  In good spirits today.  Complains of some right upper quadrant pain.    Objective    Vitals:    11/28/24 1224   BP:    Pulse: 70   Resp:    Temp:    SpO2:        Physical Exam  Constitutional: Well-developed well-nourished, no acute distress  Neck: Supple, trachea midline  Respiratory: No increased work of breathing, Symmetric excursion  Cardiovascular: Well pefursed, no jugular venous distention evident   Abdominal: Soft, non-tender, non-distended  Skin: Warm, dry, no rash on visualized skin surfaces  Psychiatric: Alert and oriented ×3, normal affect     Laboratory Results  I have personally reviewed CBC with WC 4, hemoglobin 10, platelets 135.  CMP with creatinine 0.7, AST 17, ALT 20, total bilirubin 0.9, albumin 2.6.    Radiology  No new imaging to review         César Lacy MD  Colorectal & General Surgery  [facility]    [address], [address]  [place], [address]  P: [phone]  F: [phone]

## 2024-11-28 NOTE — PLAN OF CARE
Goal Outcome Evaluation:  Plan of Care Reviewed With: patient        Progress: no change  Outcome Evaluation: Patient c/o constant severe RLQ & right flank pain, Norco given PRN and MD notified of inadequate pain relief, pain medication strength increased. NPO after midnight for mediport placement tomorrow. Plan for return to Rehab facility on Saturday per chart. Purewick in use, turn Q2H. Fall precautions maintained. Contact precautions maintained. VSS on 2L O2. Saline locked.

## 2024-11-29 ENCOUNTER — APPOINTMENT (OUTPATIENT)
Dept: GENERAL RADIOLOGY | Facility: HOSPITAL | Age: 74
DRG: 435 | End: 2024-11-29
Payer: MEDICARE

## 2024-11-29 LAB
ALBUMIN SERPL-MCNC: 2.7 G/DL (ref 3.5–5.2)
ALBUMIN/GLOB SERPL: 0.9 G/DL
ALP SERPL-CCNC: 272 U/L (ref 39–117)
ALT SERPL W P-5'-P-CCNC: 26 U/L (ref 1–33)
ANION GAP SERPL CALCULATED.3IONS-SCNC: 6 MMOL/L (ref 5–15)
AST SERPL-CCNC: 15 U/L (ref 1–32)
BILIRUB SERPL-MCNC: 0.9 MG/DL (ref 0–1.2)
BUN SERPL-MCNC: 15 MG/DL (ref 8–23)
BUN/CREAT SERPL: 21.7 (ref 7–25)
CALCIUM SPEC-SCNC: 8.8 MG/DL (ref 8.6–10.5)
CHLORIDE SERPL-SCNC: 105 MMOL/L (ref 98–107)
CO2 SERPL-SCNC: 26 MMOL/L (ref 22–29)
CREAT SERPL-MCNC: 0.69 MG/DL (ref 0.57–1)
DEPRECATED RDW RBC AUTO: 45.2 FL (ref 37–54)
EGFRCR SERPLBLD CKD-EPI 2021: 91.2 ML/MIN/1.73
ERYTHROCYTE [DISTWIDTH] IN BLOOD BY AUTOMATED COUNT: 14.3 % (ref 12.3–15.4)
GLOBULIN UR ELPH-MCNC: 2.9 GM/DL
GLUCOSE BLDC GLUCOMTR-MCNC: 223 MG/DL (ref 70–130)
GLUCOSE BLDC GLUCOMTR-MCNC: 256 MG/DL (ref 70–130)
GLUCOSE BLDC GLUCOMTR-MCNC: 277 MG/DL (ref 70–130)
GLUCOSE BLDC GLUCOMTR-MCNC: 281 MG/DL (ref 70–130)
GLUCOSE SERPL-MCNC: 260 MG/DL (ref 65–99)
HCT VFR BLD AUTO: 34.6 % (ref 34–46.6)
HGB BLD-MCNC: 10.8 G/DL (ref 12–15.9)
MAGNESIUM SERPL-MCNC: 2.1 MG/DL (ref 1.6–2.4)
MCH RBC QN AUTO: 26.8 PG (ref 26.6–33)
MCHC RBC AUTO-ENTMCNC: 31.2 G/DL (ref 31.5–35.7)
MCV RBC AUTO: 85.9 FL (ref 79–97)
PHOSPHATE SERPL-MCNC: 3 MG/DL (ref 2.5–4.5)
PLATELET # BLD AUTO: 138 10*3/MM3 (ref 140–450)
PMV BLD AUTO: 9.6 FL (ref 6–12)
POTASSIUM SERPL-SCNC: 3.8 MMOL/L (ref 3.5–5.2)
PROT SERPL-MCNC: 5.6 G/DL (ref 6–8.5)
RBC # BLD AUTO: 4.03 10*6/MM3 (ref 3.77–5.28)
SODIUM SERPL-SCNC: 137 MMOL/L (ref 136–145)
WBC NRBC COR # BLD AUTO: 4.57 10*3/MM3 (ref 3.4–10.8)

## 2024-11-29 PROCEDURE — 84100 ASSAY OF PHOSPHORUS: CPT | Performed by: HOSPITALIST

## 2024-11-29 PROCEDURE — 80053 COMPREHEN METABOLIC PANEL: CPT | Performed by: HOSPITALIST

## 2024-11-29 PROCEDURE — 83735 ASSAY OF MAGNESIUM: CPT | Performed by: HOSPITALIST

## 2024-11-29 PROCEDURE — 85027 COMPLETE CBC AUTOMATED: CPT | Performed by: HOSPITALIST

## 2024-11-29 PROCEDURE — 63710000001 INSULIN GLARGINE PER 5 UNITS: Performed by: HOSPITALIST

## 2024-11-29 PROCEDURE — 82948 REAGENT STRIP/BLOOD GLUCOSE: CPT

## 2024-11-29 PROCEDURE — 99232 SBSQ HOSP IP/OBS MODERATE 35: CPT | Performed by: STUDENT IN AN ORGANIZED HEALTH CARE EDUCATION/TRAINING PROGRAM

## 2024-11-29 PROCEDURE — 63710000001 INSULIN LISPRO (HUMAN) PER 5 UNITS: Performed by: INTERNAL MEDICINE

## 2024-11-29 RX ADMIN — CILOSTAZOL 100 MG: 100 TABLET ORAL at 20:47

## 2024-11-29 RX ADMIN — DIGOXIN 125 MCG: 125 TABLET ORAL at 12:33

## 2024-11-29 RX ADMIN — MILNACIPRAN HYDROCHLORIDE 50 MG: 50 TABLET, FILM COATED ORAL at 20:47

## 2024-11-29 RX ADMIN — TIZANIDINE 2 MG: 4 TABLET ORAL at 20:47

## 2024-11-29 RX ADMIN — MILNACIPRAN HYDROCHLORIDE 50 MG: 50 TABLET, FILM COATED ORAL at 09:48

## 2024-11-29 RX ADMIN — INSULIN LISPRO 3 UNITS: 100 INJECTION, SOLUTION INTRAVENOUS; SUBCUTANEOUS at 18:35

## 2024-11-29 RX ADMIN — ATORVASTATIN CALCIUM 10 MG: 20 TABLET, FILM COATED ORAL at 14:48

## 2024-11-29 RX ADMIN — BUPROPION HYDROCHLORIDE 150 MG: 150 TABLET, EXTENDED RELEASE ORAL at 09:47

## 2024-11-29 RX ADMIN — PRAZOSIN HYDROCHLORIDE 1 MG: 2 CAPSULE ORAL at 21:55

## 2024-11-29 RX ADMIN — TRAZODONE HYDROCHLORIDE 50 MG: 50 TABLET ORAL at 20:47

## 2024-11-29 RX ADMIN — BACLOFEN 10 MG: 10 TABLET ORAL at 09:47

## 2024-11-29 RX ADMIN — HYDROCODONE BITARTRATE AND ACETAMINOPHEN 1 TABLET: 7.5; 325 TABLET ORAL at 10:00

## 2024-11-29 RX ADMIN — Medication 10 ML: at 20:52

## 2024-11-29 RX ADMIN — BACLOFEN 10 MG: 10 TABLET ORAL at 20:46

## 2024-11-29 RX ADMIN — LEVETIRACETAM 500 MG: 500 TABLET, FILM COATED ORAL at 20:46

## 2024-11-29 RX ADMIN — BUSPIRONE HYDROCHLORIDE 15 MG: 15 TABLET ORAL at 09:48

## 2024-11-29 RX ADMIN — INSULIN GLARGINE 70 UNITS: 100 INJECTION, SOLUTION SUBCUTANEOUS at 21:55

## 2024-11-29 RX ADMIN — HYDROCODONE BITARTRATE AND ACETAMINOPHEN 1 TABLET: 7.5; 325 TABLET ORAL at 16:20

## 2024-11-29 RX ADMIN — BUSPIRONE HYDROCHLORIDE 15 MG: 15 TABLET ORAL at 16:21

## 2024-11-29 RX ADMIN — INSULIN LISPRO 4 UNITS: 100 INJECTION, SOLUTION INTRAVENOUS; SUBCUTANEOUS at 21:55

## 2024-11-29 RX ADMIN — HYDROCODONE BITARTRATE AND ACETAMINOPHEN 1 TABLET: 7.5; 325 TABLET ORAL at 20:47

## 2024-11-29 RX ADMIN — QUETIAPINE FUMARATE 50 MG: 100 TABLET ORAL at 20:46

## 2024-11-29 RX ADMIN — LEVETIRACETAM 500 MG: 500 TABLET, FILM COATED ORAL at 09:47

## 2024-11-29 RX ADMIN — CILOSTAZOL 100 MG: 100 TABLET ORAL at 14:48

## 2024-11-29 RX ADMIN — SENNOSIDES AND DOCUSATE SODIUM 2 TABLET: 50; 8.6 TABLET ORAL at 20:47

## 2024-11-29 RX ADMIN — BUSPIRONE HYDROCHLORIDE 15 MG: 15 TABLET ORAL at 20:47

## 2024-11-29 RX ADMIN — INSULIN LISPRO 4 UNITS: 100 INJECTION, SOLUTION INTRAVENOUS; SUBCUTANEOUS at 06:32

## 2024-11-29 RX ADMIN — Medication 10 ML: at 09:58

## 2024-11-29 RX ADMIN — INSULIN LISPRO 4 UNITS: 100 INJECTION, SOLUTION INTRAVENOUS; SUBCUTANEOUS at 12:33

## 2024-11-29 NOTE — PROGRESS NOTES
[facility] INPATIENT PROGRESS NOTE    Length of Stay:  9 days    CHIEF COMPLAINT/REASON FOR VISIT:  Pancreatic adenocarcinoma  Pulmonary nodules    SUBJECTIVE:   11/29/2024:   No acute overnight events.  Patient denies any concerns or complaints today    ROS:  All systems reviewed and found to be negative except for that noted above    OBJECTIVE:  Vitals:    11/28/24 1730 11/28/24 2009 11/28/24 2100 11/29/24 0502   BP: 108/69 106/66 116/68 107/62   BP Location: Right arm  Left arm Left arm   Patient Position:   Lying Lying   Pulse: 90 87 93 91   Resp: 16  16 16   Temp: 97.3 °F (36.3 °C)  97.1 °F (36.2 °C) 98 °F (36.7 °C)   TempSrc: Oral  Oral Oral   SpO2: 92%  92% 92%   Weight:       Height:             PHYSICAL EXAMINATION:  General: Obese female in no acute distress.  AOx3  Chest/Lungs: CTABL.  No Rales rhonchi wheezing  Heart: S1-S2 normal.  No murmurs  Abdomen/GI: Soft, bowel sounds present  Extremities: Status post bilateral AKA    DIAGNOSTIC DATA:  Results Review:     I reviewed the patient's new clinical results.    Results from last 7 days   Lab Units 11/29/24  0806 11/28/24 0614 11/27/24 0624   WBC 10*3/mm3 4.57 4.12 5.28   HEMOGLOBIN g/dL 10.8* 10.7* 10.8*   HEMATOCRIT % 34.6 33.3* 34.0   PLATELETS 10*3/mm3 138* 155 140      Results from last 7 days   Lab Units 11/29/24  0806 11/28/24 0614 11/27/24 0624   SODIUM mmol/L 137 135* 132*   POTASSIUM mmol/L 3.8 3.9 4.2   CHLORIDE mmol/L 105 104 103   CO2 mmol/L 26.0 26.0 22.0   BUN mg/dL 15 13 10   CREATININE mg/dL 0.69 0.70 0.71   CALCIUM mg/dL 8.8 8.7 8.3*   BILIRUBIN mg/dL 0.9 0.9 1.0   ALK PHOS U/L 272* 301* 354*   ALT (SGPT) U/L 26 28 33   AST (SGOT) U/L 15 17 14   GLUCOSE mg/dL 260* 263* 338*      Lab Results   Component Value Date    NEUTROABS 3.57 11/26/2024     Results from last 7 days   Lab Units 11/23/24  0626   INR  1.27*     Results from last 7 days   Lab Units 11/29/24  0806   MAGNESIUM mg/dL 2.1       Assessment & Plan    ASSESSMENT/PLAN:  This is a 74 y.o. female with:     *Adenocarcinoma, likely pancreatic   *Multiple bilateral pulmonary nodules, subcentimeter  *Poor performance status, ECOG 3-most likely secondary to bilateral AKA and other medical comorbidities  Presented with abdominal discomfort, found to have total bilirubin elevated at 5.2  11/21/2022 ERCP with unsuccessful cannulization.  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  11/21/2024 Ca 19-9 - 904   11/22/2024-CT abdomen-a 2.9 cm hypoenhancing pancreatic head mass concerning for pancreatic adenocarcinoma.  Mass encases and severely narrows main portal vein at the portal venous confluence.  Mass causes moderate intra and extrahepatic biliary ductal dilatation.  No abdominal lymphadenopathy.  No abdominal omental or peritoneal nodularity.   11/24/2024 ERCP with sphincterotomy, brushing and stent placement by Dr. Malave.  IR consult was then canceled.    11/24/2024 brushings of the CBD revealed atypical ductal cells Suspicious for well-differentiated adenocarcinoma  11/27/2024 CT chest-multiple right greater than left bilateral solid subcentimeter pulmonary nodules measuring up to 8 mm.  Pulmonary metastatic disease cannot be excluded.  11/27/2024-I have reviewed the imaging, pathology and discussed the diagnosis and management with the patient.  She has a 2.9 cm hypoenhancing pancreatic head mass, elevated CA 19-9, and CBD brushings suspicious for well-differentiated adenocarcinoma.  She likely has pancreatic adenocarcinoma.  I have discussed next steps in management-obtaining a PET scan as an outpatient to complete staging; and referral to surgical oncology at [facility] for surgical management opinion.  Unfortunately, patient is a nursing home resident.  She does have poor performance status mainly due to her medical comorbidities and bilateral AKA.  She seems to be a poor surgical candidate due to her medical comorbidities.  Would likely consider  palliative chemo with Gemzar dose reduced to 800 mg/m² day 1 and day 15 of a 28-day cycle for better tolerance.  Reviewed possible side effects of chemotherapy including but not limited to cytopenia, febrile neutropenia, nausea, vomiting, hair loss.  11/28/2024: Patient is planned for port placement on Friday.  We will need patient's height prior to proceeding with chemotherapy plan.  I have reached out to bedside RN to obtain her height  11/29/2024: She is planned for port placement today.  We will plan to start Gemzar at 800 mg/m² as outpatient on day 1 and day 15 of a 28-day cycle.  She will be given appointments for chemo education.    *Elevated T bilirubin, resolvedWith CBD stent placement     *Anemia of chronic disease  Upon lab review she has had anemia since at least January 2022 with hemoglobin around 10.  11/27/2024 hemoglobin 10.8.  Lab evaluation consistent with anemia of chronic disease  11/28/2024 CBC pending     *Chronic osteomyelitis with chronic stage IV sacral decub ulcer  No antibiotics recommended per infectious disease.     *Other medical comorbidities-diabetes type 2, hypertension, hyperlipidemia, bilateral above-knee amputations, A-fib on Eliquis     RECOMMENDATIONS/PLAN:   Obtain  Caris testing with [employer] on the CBD brushing  We Will consider genetic testing/counseling as outpatient  Plan to administer  dose reduced 800 mg/m² day 1 and day 15 of 28-day cycle for tolerance  She will be given chemo education appointment; and appointment with me on cycle 1 day 1 Gemzar  CBC with differential daily while inpatient.  We will follow along       Fay Ramon MD

## 2024-11-29 NOTE — PROGRESS NOTES
Name: Chantal Kumar ADMIT: 2024   : 1950  PCP: Sasha Bui MD    MRN: [id number] LOS: 9 days   AGE/SEX: 74 y.o. female  ROOM: [facility]/     Subjective   Subjective   Feeling good again this AM. No N/V/abd pain.  NPO for OR later. No F/C/NS. No SOA or CP. Voiding well.       Objective   Objective   Vital Signs  Temp:  [97.1 °F (36.2 °C)-98.5 °F (36.9 °C)] 98 °F (36.7 °C)  Heart Rate:  [70-93] 91  Resp:  [16-18] 16  BP: (106-116)/(59-69) 107/62  SpO2:  [92 %-93 %] 92 %  on  Flow (L/min) (Oxygen Therapy):  [2] 2;   Device (Oxygen Therapy): room air  Body mass index is 77.79 kg/m².    (No change in exam today)    Physical Exam  Vitals and nursing note reviewed.   Constitutional:       General: She is not in acute distress.     Appearance: She is not ill-appearing, toxic-appearing or diaphoretic.   HENT:      Head: Normocephalic.      Nose: Nose normal.      Mouth/Throat:      Mouth: Mucous membranes are moist.      Pharynx: Oropharynx is clear.   Eyes:      General: No scleral icterus.        Right eye: No discharge.         Left eye: No discharge.      Conjunctiva/sclera: Conjunctivae normal.   Cardiovascular:      Rate and Rhythm: Normal rate and regular rhythm.   Pulmonary:      Effort: Pulmonary effort is normal. No respiratory distress.      Breath sounds: Normal breath sounds. No wheezing or rales.   Abdominal:      General: Bowel sounds are normal. There is no distension.      Palpations: Abdomen is soft.      Tenderness: There is no abdominal tenderness.   Musculoskeletal:      Cervical back: Neck supple.      Comments: S/p bilateral AKA   Skin:     General: Skin is warm and dry.      Capillary Refill: Capillary refill takes less than 2 seconds.      Coloration: Skin is not jaundiced.   Neurological:      General: No focal deficit present.      Mental Status: She is alert and oriented to person, place, and time. Mental status is at baseline.      Cranial Nerves: No cranial nerve deficit.    Psychiatric:         Mood and Affect: Mood normal.         Behavior: Behavior normal.         Thought Content: Thought content normal.       Results Review     I reviewed the patient's new clinical results.  Results from last 7 days   Lab Units 11/28/24  0614 11/27/24 0624 11/26/24 0458 11/25/24  0518   WBC 10*3/mm3 4.12 5.28 5.05 4.49   HEMOGLOBIN g/dL 10.7* 10.8* 10.3* 9.9*   PLATELETS 10*3/mm3 155 140 137* 148     Results from last 7 days   Lab Units 11/28/24  0614 11/27/24 0624 11/26/24 0458 11/24/24 0628   SODIUM mmol/L 135* 132* 136 138   POTASSIUM mmol/L 3.9 4.2 3.7 3.9   CHLORIDE mmol/L 104 103 102 108*   CO2 mmol/L 26.0 22.0 24.2 21.0*   BUN mg/dL 13 10 8 9   CREATININE mg/dL 0.70 0.71 0.74 0.58   GLUCOSE mg/dL 263* 338* 347* 202*   EGFR mL/min/1.73 90.9 89.4 85.0 95.1     Results from last 7 days   Lab Units 11/28/24  0614 11/27/24 0624 11/26/24 0458 11/24/24 0628   ALBUMIN g/dL 2.6* 2.5* 2.4* 2.4*   BILIRUBIN mg/dL 0.9 1.0 1.2 1.4*   ALK PHOS U/L 301* 354* 390* 571*   AST (SGOT) U/L 17 14 18 26   ALT (SGPT) U/L 28 33 37* 59*     Results from last 7 days   Lab Units 11/28/24  0614 11/27/24  0624 11/26/24  1848 11/26/24 0458 11/24/24 0628   CALCIUM mg/dL 8.7 8.3*  --  8.1* 8.3*   ALBUMIN g/dL 2.6* 2.5*  --  2.4* 2.4*   MAGNESIUM mg/dL 2.1 2.4  --  2.0  --    PHOSPHORUS mg/dL 2.7 2.6 1.9* 2.0*  --            Glucose   Date/Time Value Ref Range Status   11/29/2024 0616 281 (H) 70 - 130 mg/dL Final   11/28/2024 2205 364 (H) 70 - 130 mg/dL Final   11/28/2024 1750 284 (H) 70 - 130 mg/dL Final   11/28/2024 1158 309 (H) 70 - 130 mg/dL Final   11/28/2024 0806 261 (H) 70 - 130 mg/dL Final   11/27/2024 2025 390 (H) 70 - 130 mg/dL Final   11/27/2024 1812 405 (C) 70 - 130 mg/dL Final       CT Chest With Contrast Diagnostic    Result Date: 11/27/2024  Multiple right greater than left bilateral solid subcentimeter pulmonary nodules measuring up to 8 mm will need continued follow-up as pulmonary metastatic  disease cannot be excluded. Outside prior imaging of the chest, if available, would be helpful to demonstrate nodule stability.  This report was finalized on 11/27/2024 1:22 PM by Dr. Grady Sandoval M.D on Workstation: [id number]       I have personally reviewed all medications:  Scheduled Medications  [Held by provider] apixaban, 5 mg, Oral, Q12H  aspirin, 81 mg, Oral, Daily  atorvastatin, 10 mg, Oral, Daily  baclofen, 10 mg, Oral, BID  buPROPion XL, 150 mg, Oral, Daily  busPIRone, 15 mg, Oral, TID  cetirizine, 10 mg, Oral, Daily  cholestyramine light, 1 packet, Oral, Daily  cilostazol, 100 mg, Oral, BID  digoxin, 125 mcg, Oral, Daily  ferrous sulfate, 325 mg, Oral, Daily With Breakfast  insulin glargine, 60 Units, Subcutaneous, Nightly  insulin lispro, 2-7 Units, Subcutaneous, 4x Daily AC & at Bedtime  levETIRAcetam, 500 mg, Oral, BID  milnacipran, 50 mg, Oral, Q12H  multivitamin with minerals, 1 tablet, Oral, Daily  polyethylene glycol, 17 g, Oral, Daily  prazosin, 1 mg, Oral, Nightly  QUEtiapine, 50 mg, Oral, Nightly  senna-docusate sodium, 2 tablet, Oral, BID  sodium chloride, 10 mL, Intravenous, Q12H  traZODone, 50 mg, Oral, Nightly    Infusions   Diet  NPO Diet NPO Type: Strict NPO    I have personally reviewed:  [x]  Laboratory   []  Microbiology   []  Radiology   []  EKG/Telemetry  []  Cardiology/Vascular   []  Pathology    []  Records       Assessment/Plan     Active Hospital Problems    Diagnosis  POA    **Bile duct obstruction [K83.1]  Yes    Pancreatic adenocarcinoma [C25.9]  Yes    Malignant neoplasm of other parts of pancreas [C25.7]  Unknown    Persistent atrial fibrillation [I48.19]  Yes    S/P AKA (above knee amputation) bilateral [Z89.611, Z89.612]  Not Applicable    Chronic diastolic CHF (congestive heart failure) [I50.32]  Yes    History of CVA (cerebrovascular accident) [Z86.73]  Not Applicable    Bipolar disorder, unspecified [F31.9]  Yes    Secondary hypertension [I15.9]  Yes    Type 2  "diabetes mellitus with diabetic neuropathy, unspecified [E11.40]  Yes      Resolved Hospital Problems   No resolved problems to display.       74 y.o. female admitted with biliary obstruction.    Biliary obstruction due to stricture  Pancreatic adenoCA  -ERCP unsuccessful on 11/21, second attempt on 11/24 was successful and stent placed  - elevated at 904  -path showed adenoCA  -GI recommends Heme/Onc here at [facility] and appt with Dr. Carter (Surg Onc) at [facility]  -Heme/Onc has seen and plans chemotx, outpt chemotx education appt on 12/12 with plans to start on 12/19  -Gen Surg consulted for port placement (today)  -tolerating regular diet  -LFTs, TBili, and AlkPhos all improved     Chronic osteomyelitis associated with chronic stage 4 sacral decubitus ulcer  -PCT wnl, CRP elevated  -pictures of wound in chart, the wound does not appear infected by exam on admission  -ID consulted and recommend no antibiotics, f/u outpatient with wound care  -She was seen by wound care here in the hospital and they recommend: \"Cleanse with NS - using q tip placed a piece of opticel into the wound space - apply a 4x4 or 6x6 optifoam by folding and adequately placing in the gluteal cleft assuring the foam portion of the dressing makes contact with skin; change every other day\"     Atrial fibrillation  Chronic AC (Eliquis)  -metoprolol on hold for now as BPs are on the low side  -continue digoxin  -HRs fine so far and BPs somewhat improved  -patient has been seen by Cardiology in the hospital  -continue aspirin  -GI okay with restarting Eliquis 11/25--on hold now for port placement today     PAD  Bilateral AKA  -continue aspirin and Pletal     Seizure history  -continue Keppra  -no seizure activity here     Type 2 DM  -sugars high as po intake increasing  -increase Lantus to 70 units  -continue SSI  -A1c 8.2    Chronic hypoxic resp failure  -stable on 2L/min by NC    Hypophosphatemia  -replaced with protocol    Itching ears  -trial of " Zyrtec      Eliquis should suffice for DVT prophylaxis.  Full code.  Discussed with patient.  Anticipate discharge back to SNU facility on Saturday.  Expected Discharge Date: 11/30/2024; Expected Discharge Time:       Deejay Mann MD  [facility]  11/29/24  08:07 EST

## 2024-11-29 NOTE — PLAN OF CARE
Goal Outcome Evaluation:  Plan of Care Reviewed With: patient        Progress: no change  Outcome Evaluation: Pain well controlled w/ PO pain meds, no c/o of nausea, NPO for mediport placement today, CHG wipes applied monitoring blood sugars-coverage given as ordered, contact precautions maintained, 2L O2 NC, saline locked, axox4, bed alarm on, fall precautions maintained    Pt refused morning labs

## 2024-11-29 NOTE — PROGRESS NOTES
I called and spoke with patient's niece who is also her power of .  The niece works at the nursing home facility from where the patient had come.  I had a long discussion with the niece regarding patient's recent diagnosis of adenocarcinoma, likely pancreatic in origin.  I reviewed the results of imaging which showed bilateral subcentimeter pulmonary nodules.  Discussed the nodules are subcentimeter, and as such too small for biopsy and are PET avidity.  as such, via unable to assess whether these are metastatic especially in the absence of previous imaging.  Further discussed with patient's multiple medical comorbidities and poor performance status, she is likely not a surgical candidate.  I also discussed patient's poor candidacy for chemotherapy given her poor performance status and comorbidities.  Expressed understanding and was in agreement.Efe  expressed her concerns as well for patient's tolerance of chemotherapy that she stated patient is noncompliant; and has the stage IV sacral decub for at least 4 years or more.  The niece was concerned that chemotherapy might make the patient immunosuppressed, and as such increase her risk for infection of the sacral decub and eventually make the patient septic.  These are all valid concerns.  Mutually we agreed to readdress this tomorrow when I see the patient at bedside.  I will be calling the niece on speaker phone so that patient as well as niece are on the same page.  The niece further expressed that she does not have the power, and will never will overrule patient's wishes.    We discussed on holding off on port given pending final decision regarding chemotherapy.    Lastly, the nekrystin informed me that the patient is a DNR, and has always been at the outside facility.  She reports she has all the documents that she can fax if need be to support this.  I have conveyed it to the bedside RN via secure chat.

## 2024-11-29 NOTE — CASE MANAGEMENT/SOCIAL WORK
Continued Stay Note  [facility]     Patient Name: Chantal Kumar  MRN: [id number]  Today's Date: 11/29/2024    Admit Date: 11/20/2024    Plan: From/Return to [facility]- Medicaid bed pending approval of chemo treatment plan   Discharge Plan       Row Name 11/29/24 1043       Plan    Plan From[facility] approval of chemo treatment plan    Plan Comments Spoke with [facility] Rep to discuss Chemo treatment plan- Gemzar and that patient would require transportation to [facility] to complete treatments. Rep stated they would have facility review with [facility] office and determine if patient would be able to have treatments and they would call back. CCP awaiting call back. CCP received incoming call from patient's neice who requested oncologist call her to discuss treatment plan , updated RN. CCP spoke with patient at bedside to inform her we are waiting on determination from LTC facility if she can have Gemzar treatments while living there and she verbalized understanding. Patient also stated she was agreeable to coming to [facility] for treatments if approved by facility. CCP to follow. Adán OSORIO RN                   Discharge Codes    No documentation.                 Expected Discharge Date and Time       Expected Discharge Date Expected Discharge Time    Nov 30, 2024               Adán Mays RN

## 2024-11-29 NOTE — PLAN OF CARE
Goal Outcome Evaluation:  Plan of Care Reviewed With: patient           Outcome Evaluation: VSS, on 2L O2, voiding adequate amount via purewick, accuchecks w/ SSI as indicated, turns q2h, wound care to be done on night shift. Pain controlled w/ norco. Falls and contact precautions in place. SL, remains A&O x4. Pt niece who is also her POA had concerns about port placement today and wished to speak to oncology prior to port placement. This RN asked Dr. Ramon w/ oncology to reach out to patient's niece about her concerns - see Dr. Ramon's note. This RN also spoke w/ Dr. Mann and Dr. Lacy who are both aware of the plan to hold off on the port for now. Patient updated on plan of care. Palliative care consult placed. Per patient nieceSarah, the patient is a DNR. This RN requested the paperwork and provided Lynettejose the fax number to have the paperwork sent here.

## 2024-11-30 PROBLEM — I50.32 CHRONIC HEART FAILURE WITH PRESERVED EJECTION FRACTION (HFPEF): Status: ACTIVE | Noted: 2024-11-30

## 2024-11-30 PROBLEM — K83.1 BILE DUCT OBSTRUCTION: Status: RESOLVED | Noted: 2024-11-20 | Resolved: 2024-11-30

## 2024-11-30 LAB
ALBUMIN SERPL-MCNC: 2.6 G/DL (ref 3.5–5.2)
ALBUMIN/GLOB SERPL: 0.9 G/DL
ALP SERPL-CCNC: 241 U/L (ref 39–117)
ALT SERPL W P-5'-P-CCNC: 22 U/L (ref 1–33)
ANION GAP SERPL CALCULATED.3IONS-SCNC: 9.6 MMOL/L (ref 5–15)
AST SERPL-CCNC: 15 U/L (ref 1–32)
BILIRUB SERPL-MCNC: 0.8 MG/DL (ref 0–1.2)
BUN SERPL-MCNC: 16 MG/DL (ref 8–23)
BUN/CREAT SERPL: 23.9 (ref 7–25)
CALCIUM SPEC-SCNC: 8.6 MG/DL (ref 8.6–10.5)
CHLORIDE SERPL-SCNC: 105 MMOL/L (ref 98–107)
CO2 SERPL-SCNC: 26.4 MMOL/L (ref 22–29)
CREAT SERPL-MCNC: 0.67 MG/DL (ref 0.57–1)
DEPRECATED RDW RBC AUTO: 45.4 FL (ref 37–54)
EGFRCR SERPLBLD CKD-EPI 2021: 91.8 ML/MIN/1.73
ERYTHROCYTE [DISTWIDTH] IN BLOOD BY AUTOMATED COUNT: 14.3 % (ref 12.3–15.4)
GLOBULIN UR ELPH-MCNC: 2.9 GM/DL
GLUCOSE BLDC GLUCOMTR-MCNC: 236 MG/DL (ref 70–130)
GLUCOSE BLDC GLUCOMTR-MCNC: 314 MG/DL (ref 70–130)
GLUCOSE BLDC GLUCOMTR-MCNC: 322 MG/DL (ref 70–130)
GLUCOSE BLDC GLUCOMTR-MCNC: 467 MG/DL (ref 70–130)
GLUCOSE BLDC GLUCOMTR-MCNC: 513 MG/DL (ref 70–130)
GLUCOSE SERPL-MCNC: 240 MG/DL (ref 65–99)
HCT VFR BLD AUTO: 34.8 % (ref 34–46.6)
HGB BLD-MCNC: 10.8 G/DL (ref 12–15.9)
MAGNESIUM SERPL-MCNC: 1.9 MG/DL (ref 1.6–2.4)
MCH RBC QN AUTO: 26.9 PG (ref 26.6–33)
MCHC RBC AUTO-ENTMCNC: 31 G/DL (ref 31.5–35.7)
MCV RBC AUTO: 86.8 FL (ref 79–97)
PHOSPHATE SERPL-MCNC: 3.5 MG/DL (ref 2.5–4.5)
PLATELET # BLD AUTO: 149 10*3/MM3 (ref 140–450)
PMV BLD AUTO: 9.5 FL (ref 6–12)
POTASSIUM SERPL-SCNC: 3.4 MMOL/L (ref 3.5–5.2)
POTASSIUM SERPL-SCNC: 4.9 MMOL/L (ref 3.5–5.2)
PROT SERPL-MCNC: 5.5 G/DL (ref 6–8.5)
RBC # BLD AUTO: 4.01 10*6/MM3 (ref 3.77–5.28)
SODIUM SERPL-SCNC: 141 MMOL/L (ref 136–145)
WBC NRBC COR # BLD AUTO: 4.33 10*3/MM3 (ref 3.4–10.8)

## 2024-11-30 PROCEDURE — 84100 ASSAY OF PHOSPHORUS: CPT | Performed by: HOSPITALIST

## 2024-11-30 PROCEDURE — 63710000001 INSULIN GLARGINE PER 5 UNITS: Performed by: HOSPITALIST

## 2024-11-30 PROCEDURE — 63710000001 INSULIN LISPRO (HUMAN) PER 5 UNITS: Performed by: NURSE PRACTITIONER

## 2024-11-30 PROCEDURE — 80053 COMPREHEN METABOLIC PANEL: CPT | Performed by: HOSPITALIST

## 2024-11-30 PROCEDURE — 63710000001 INSULIN LISPRO (HUMAN) PER 5 UNITS: Performed by: INTERNAL MEDICINE

## 2024-11-30 PROCEDURE — 82948 REAGENT STRIP/BLOOD GLUCOSE: CPT

## 2024-11-30 PROCEDURE — 85027 COMPLETE CBC AUTOMATED: CPT | Performed by: HOSPITALIST

## 2024-11-30 PROCEDURE — 83735 ASSAY OF MAGNESIUM: CPT | Performed by: HOSPITALIST

## 2024-11-30 PROCEDURE — 84132 ASSAY OF SERUM POTASSIUM: CPT | Performed by: STUDENT IN AN ORGANIZED HEALTH CARE EDUCATION/TRAINING PROGRAM

## 2024-11-30 PROCEDURE — 99233 SBSQ HOSP IP/OBS HIGH 50: CPT | Performed by: STUDENT IN AN ORGANIZED HEALTH CARE EDUCATION/TRAINING PROGRAM

## 2024-11-30 RX ORDER — CETIRIZINE HYDROCHLORIDE 10 MG/1
5 TABLET ORAL DAILY
Qty: 30 TABLET | Refills: 0 | Status: SHIPPED | OUTPATIENT
Start: 2024-11-30

## 2024-11-30 RX ORDER — TIZANIDINE 2 MG/1
2 TABLET ORAL EVERY 8 HOURS PRN
Qty: 30 TABLET | Refills: 0 | Status: SHIPPED | OUTPATIENT
Start: 2024-11-30

## 2024-11-30 RX ORDER — ONDANSETRON 4 MG/1
4 TABLET, ORALLY DISINTEGRATING ORAL EVERY 8 HOURS PRN
Qty: 30 TABLET | Refills: 0 | Status: SHIPPED | OUTPATIENT
Start: 2024-11-30

## 2024-11-30 RX ORDER — AMOXICILLIN 250 MG
2 CAPSULE ORAL 2 TIMES DAILY
Qty: 60 TABLET | Refills: 0 | Status: SHIPPED | OUTPATIENT
Start: 2024-11-30

## 2024-11-30 RX ORDER — LACTULOSE 10 G/15ML
10 SOLUTION ORAL 2 TIMES DAILY
Status: DISCONTINUED | OUTPATIENT
Start: 2024-11-30 | End: 2024-12-01 | Stop reason: HOSPADM

## 2024-11-30 RX ORDER — POTASSIUM CHLORIDE 750 MG/1
40 TABLET, FILM COATED, EXTENDED RELEASE ORAL EVERY 4 HOURS
Status: COMPLETED | OUTPATIENT
Start: 2024-11-30 | End: 2024-11-30

## 2024-11-30 RX ORDER — OXYCODONE HYDROCHLORIDE 5 MG/1
5 TABLET ORAL EVERY 6 HOURS PRN
Status: DISCONTINUED | OUTPATIENT
Start: 2024-11-30 | End: 2024-12-01 | Stop reason: HOSPADM

## 2024-11-30 RX ORDER — INSULIN LISPRO 100 [IU]/ML
2-9 INJECTION, SOLUTION INTRAVENOUS; SUBCUTANEOUS
Status: DISCONTINUED | OUTPATIENT
Start: 2024-11-30 | End: 2024-12-01 | Stop reason: HOSPADM

## 2024-11-30 RX ORDER — INSULIN LISPRO 100 [IU]/ML
5 INJECTION, SOLUTION INTRAVENOUS; SUBCUTANEOUS ONCE
Status: COMPLETED | OUTPATIENT
Start: 2024-11-30 | End: 2024-11-30

## 2024-11-30 RX ADMIN — ATORVASTATIN CALCIUM 10 MG: 20 TABLET, FILM COATED ORAL at 08:30

## 2024-11-30 RX ADMIN — DIGOXIN 125 MCG: 125 TABLET ORAL at 12:33

## 2024-11-30 RX ADMIN — APIXABAN 5 MG: 5 TABLET, FILM COATED ORAL at 20:36

## 2024-11-30 RX ADMIN — SENNOSIDES AND DOCUSATE SODIUM 2 TABLET: 50; 8.6 TABLET ORAL at 20:36

## 2024-11-30 RX ADMIN — SENNOSIDES AND DOCUSATE SODIUM 2 TABLET: 50; 8.6 TABLET ORAL at 08:30

## 2024-11-30 RX ADMIN — ASPIRIN 81 MG: 81 TABLET, COATED ORAL at 08:30

## 2024-11-30 RX ADMIN — BUSPIRONE HYDROCHLORIDE 15 MG: 15 TABLET ORAL at 08:30

## 2024-11-30 RX ADMIN — INSULIN LISPRO 7 UNITS: 100 INJECTION, SOLUTION INTRAVENOUS; SUBCUTANEOUS at 22:37

## 2024-11-30 RX ADMIN — BUSPIRONE HYDROCHLORIDE 15 MG: 15 TABLET ORAL at 20:36

## 2024-11-30 RX ADMIN — INSULIN LISPRO 7 UNITS: 100 INJECTION, SOLUTION INTRAVENOUS; SUBCUTANEOUS at 18:33

## 2024-11-30 RX ADMIN — INSULIN LISPRO 3 UNITS: 100 INJECTION, SOLUTION INTRAVENOUS; SUBCUTANEOUS at 09:59

## 2024-11-30 RX ADMIN — TIZANIDINE 2 MG: 4 TABLET ORAL at 20:35

## 2024-11-30 RX ADMIN — INSULIN GLARGINE 70 UNITS: 100 INJECTION, SOLUTION SUBCUTANEOUS at 20:35

## 2024-11-30 RX ADMIN — POTASSIUM CHLORIDE 40 MEQ: 750 TABLET, EXTENDED RELEASE ORAL at 12:33

## 2024-11-30 RX ADMIN — TRAZODONE HYDROCHLORIDE 50 MG: 50 TABLET ORAL at 20:35

## 2024-11-30 RX ADMIN — BUSPIRONE HYDROCHLORIDE 15 MG: 15 TABLET ORAL at 16:33

## 2024-11-30 RX ADMIN — MILNACIPRAN HYDROCHLORIDE 50 MG: 50 TABLET, FILM COATED ORAL at 20:35

## 2024-11-30 RX ADMIN — POTASSIUM CHLORIDE 40 MEQ: 750 TABLET, EXTENDED RELEASE ORAL at 08:30

## 2024-11-30 RX ADMIN — CILOSTAZOL 100 MG: 100 TABLET ORAL at 08:30

## 2024-11-30 RX ADMIN — OXYCODONE HYDROCHLORIDE 5 MG: 5 TABLET ORAL at 16:33

## 2024-11-30 RX ADMIN — TRAMADOL HYDROCHLORIDE 50 MG: 50 TABLET, COATED ORAL at 20:37

## 2024-11-30 RX ADMIN — Medication 10 ML: at 20:35

## 2024-11-30 RX ADMIN — TRAMADOL HYDROCHLORIDE 50 MG: 50 TABLET, COATED ORAL at 10:14

## 2024-11-30 RX ADMIN — INSULIN LISPRO 5 UNITS: 100 INJECTION, SOLUTION INTRAVENOUS; SUBCUTANEOUS at 20:35

## 2024-11-30 RX ADMIN — Medication 1 TABLET: at 08:30

## 2024-11-30 RX ADMIN — BUPROPION HYDROCHLORIDE 150 MG: 150 TABLET, EXTENDED RELEASE ORAL at 08:30

## 2024-11-30 RX ADMIN — BACLOFEN 10 MG: 10 TABLET ORAL at 08:30

## 2024-11-30 RX ADMIN — BACLOFEN 10 MG: 10 TABLET ORAL at 20:35

## 2024-11-30 RX ADMIN — LEVETIRACETAM 500 MG: 500 TABLET, FILM COATED ORAL at 08:30

## 2024-11-30 RX ADMIN — LEVETIRACETAM 500 MG: 500 TABLET, FILM COATED ORAL at 20:35

## 2024-11-30 RX ADMIN — TIZANIDINE 2 MG: 4 TABLET ORAL at 10:14

## 2024-11-30 RX ADMIN — MILNACIPRAN HYDROCHLORIDE 50 MG: 50 TABLET, FILM COATED ORAL at 08:30

## 2024-11-30 RX ADMIN — LACTULOSE 10 G: 10 SOLUTION ORAL at 20:36

## 2024-11-30 RX ADMIN — QUETIAPINE FUMARATE 50 MG: 100 TABLET ORAL at 20:35

## 2024-11-30 RX ADMIN — CETIRIZINE HYDROCHLORIDE 10 MG: 10 TABLET, FILM COATED ORAL at 08:30

## 2024-11-30 RX ADMIN — INSULIN LISPRO 5 UNITS: 100 INJECTION, SOLUTION INTRAVENOUS; SUBCUTANEOUS at 12:33

## 2024-11-30 RX ADMIN — CHOLESTYRAMINE 4 G: 4 POWDER, FOR SUSPENSION ORAL at 08:30

## 2024-11-30 RX ADMIN — POLYETHYLENE GLYCOL 3350 17 G: 17 POWDER, FOR SOLUTION ORAL at 08:31

## 2024-11-30 RX ADMIN — CILOSTAZOL 100 MG: 100 TABLET ORAL at 20:35

## 2024-11-30 RX ADMIN — FERROUS SULFATE TAB 325 MG (65 MG ELEMENTAL FE) 325 MG: 325 (65 FE) TAB at 08:30

## 2024-11-30 RX ADMIN — OXYCODONE HYDROCHLORIDE 5 MG: 5 TABLET ORAL at 22:37

## 2024-11-30 NOTE — PLAN OF CARE
Goal Outcome Evaluation:  Plan of Care Reviewed With: patient        Progress: no change  Outcome Evaluation: Patient is A&Ox4, VSS, 2L NC. Q2 hr tunrs completed. Fall and isolation precautions miantained. Woud care completed per order. Pt teary and voiced fear of future. Zanaflex and Norco given x1. Resting well throughout night. No BM yet. POC ongoing.

## 2024-11-30 NOTE — PLAN OF CARE
Goal Outcome Evaluation:      Went over the plan of car and answered all questions. Vitals stable and pain is well controlled. All meds given without complications and no new issues  this shift.

## 2024-11-30 NOTE — PROGRESS NOTES
Name: Chantal Kumar ADMIT: 2024   : 1950  PCP: Sasha Bui MD    MRN: [id number] LOS: 10 days   AGE/SEX: 74 y.o. female  ROOM: [facility]/     Interval History:   Oncology had Goals of care discussion with pt and niece.  After discussion with oncologist pt has opted to focus on quality of life and go home with hospice or to a facility with hospice.      Subjective   Subjective   Appetite fair.  Reports increased right sided Upper quad pain at times.  RN reports she is getting good rest following tramadol dosing.  Denies n/v, chest pain/ pressure, shoa.  Last BM she reports was last Saturday. Which she says is typical for her.         Objective   Objective   Vital Signs  Temp:  [97.3 °F (36.3 °C)-97.6 °F (36.4 °C)] 97.4 °F (36.3 °C)  Heart Rate:  [66-88] 88  Resp:  [16] 16  BP: ()/(56-67) 96/67  SpO2:  [94 %-97 %] 94 %  on  Flow (L/min) (Oxygen Therapy):  [2] 2;   Device (Oxygen Therapy): nasal cannula  Body mass index is 77.79 kg/m².      Physical Exam  Vitals and nursing note reviewed.   Constitutional:       General: She is not in acute distress.     Appearance: She is obese. She is not ill-appearing.      Comments: Sitting up in bed eating lunch.    HENT:      Head: Normocephalic.      Nose: Nose normal.      Mouth/Throat:      Mouth: Mucous membranes are moist.      Pharynx: Oropharynx is clear.   Eyes:      General: No scleral icterus.        Right eye: No discharge.         Left eye: No discharge.      Conjunctiva/sclera: Conjunctivae normal.   Cardiovascular:      Rate and Rhythm: Normal rate and regular rhythm.   Pulmonary:      Effort: Pulmonary effort is normal. No respiratory distress.      Breath sounds: Normal breath sounds. No wheezing or rales.      Comments: Poor inspiratory effort.   Abdominal:      General: Bowel sounds are normal. There is no distension.      Palpations: Abdomen is soft.      Tenderness: There is no abdominal tenderness.   Musculoskeletal:       Cervical back: Neck supple.      Right lower leg: No edema.      Left lower leg: No edema.      Comments: S/p bilateral AKA   Skin:     General: Skin is warm and dry.      Capillary Refill: Capillary refill takes less than 2 seconds.      Coloration: Skin is not jaundiced.   Neurological:      General: No focal deficit present.      Mental Status: She is alert and oriented to person, place, and time. Mental status is at baseline.      Cranial Nerves: No cranial nerve deficit.   Psychiatric:         Behavior: Behavior normal.         Thought Content: Thought content normal.      Comments: Flat affect       Results Review     I reviewed the patient's new clinical results.  Results from last 7 days   Lab Units 11/30/24 0453 11/29/24  0806 11/28/24  0614 11/27/24 0624   WBC 10*3/mm3 4.33 4.57 4.12 5.28   HEMOGLOBIN g/dL 10.8* 10.8* 10.7* 10.8*   PLATELETS 10*3/mm3 149 138* 155 140     Results from last 7 days   Lab Units 11/30/24 0453 11/29/24  0806 11/28/24  0614 11/27/24  0624   SODIUM mmol/L 141 137 135* 132*   POTASSIUM mmol/L 3.4* 3.8 3.9 4.2   CHLORIDE mmol/L 105 105 104 103   CO2 mmol/L 26.4 26.0 26.0 22.0   BUN mg/dL 16 15 13 10   CREATININE mg/dL 0.67 0.69 0.70 0.71   GLUCOSE mg/dL 240* 260* 263* 338*   EGFR mL/min/1.73 91.8 91.2 90.9 89.4     Results from last 7 days   Lab Units 11/30/24  0453 11/29/24  0806 11/28/24  0614 11/27/24  0624   ALBUMIN g/dL 2.6* 2.7* 2.6* 2.5*   BILIRUBIN mg/dL 0.8 0.9 0.9 1.0   ALK PHOS U/L 241* 272* 301* 354*   AST (SGOT) U/L 15 15 17 14   ALT (SGPT) U/L 22 26 28 33     Results from last 7 days   Lab Units 11/30/24  0453 11/29/24  0806 11/28/24  0614 11/27/24  0624   CALCIUM mg/dL 8.6 8.8 8.7 8.3*   ALBUMIN g/dL 2.6* 2.7* 2.6* 2.5*   MAGNESIUM mg/dL 1.9 2.1 2.1 2.4   PHOSPHORUS mg/dL 3.5 3.0 2.7 2.6           Glucose   Date/Time Value Ref Range Status   11/30/2024 1222 322 (H) 70 - 130 mg/dL Final   11/30/2024 0558 236 (H) 70 - 130 mg/dL Final   11/29/2024 2127 277 (H) 70 -  130 mg/dL Final   11/29/2024 1634 223 (H) 70 - 130 mg/dL Final   11/29/2024 1140 256 (H) 70 - 130 mg/dL Final   11/29/2024 0616 281 (H) 70 - 130 mg/dL Final   11/28/2024 2205 364 (H) 70 - 130 mg/dL Final       No radiology results for the last day    I have personally reviewed all medications:  Scheduled Medications  [Held by provider] apixaban, 5 mg, Oral, Q12H  aspirin, 81 mg, Oral, Daily  atorvastatin, 10 mg, Oral, Daily  baclofen, 10 mg, Oral, BID  buPROPion XL, 150 mg, Oral, Daily  busPIRone, 15 mg, Oral, TID  cetirizine, 10 mg, Oral, Daily  cholestyramine light, 1 packet, Oral, Daily  cilostazol, 100 mg, Oral, BID  digoxin, 125 mcg, Oral, Daily  ferrous sulfate, 325 mg, Oral, Daily With Breakfast  insulin glargine, 70 Units, Subcutaneous, Nightly  insulin lispro, 2-7 Units, Subcutaneous, 4x Daily AC & at Bedtime  levETIRAcetam, 500 mg, Oral, BID  milnacipran, 50 mg, Oral, Q12H  multivitamin with minerals, 1 tablet, Oral, Daily  polyethylene glycol, 17 g, Oral, Daily  prazosin, 1 mg, Oral, Nightly  QUEtiapine, 50 mg, Oral, Nightly  senna-docusate sodium, 2 tablet, Oral, BID  sodium chloride, 10 mL, Intravenous, Q12H  traZODone, 50 mg, Oral, Nightly    Infusions   Diet  Diet: Regular/House, Diabetic, Gastrointestinal; Consistent Carbohydrate; Fat-Restricted; Texture: Regular (IDDSI 7); Fluid Consistency: Thin (IDDSI 0)    I have personally reviewed:  [x]  Laboratory   []  Microbiology   []  Radiology   []  EKG/Telemetry  []  Cardiology/Vascular   []  Pathology    [x]  Records    Estimated Creatinine Clearance: 55.7 mL/min (by C-G formula based on SCr of 0.67 mg/dL).       Assessment/Plan     Active Hospital Problems    Diagnosis  POA    **Bile duct obstruction [K83.1]  Yes    Pancreatic adenocarcinoma [C25.9]  Yes    Malignant neoplasm of other parts of pancreas [C25.7]  Unknown    Persistent atrial fibrillation [I48.19]  Yes    S/P AKA (above knee amputation) bilateral [Z89.611, Z89.612]  Not Applicable     "Chronic diastolic CHF (congestive heart failure) [I50.32]  Yes    History of CVA (cerebrovascular accident) [Z86.73]  Not Applicable    Bipolar disorder, unspecified [F31.9]  Yes    Secondary hypertension [I15.9]  Yes    Type 2 diabetes mellitus with diabetic neuropathy, unspecified [E11.40]  Yes      Resolved Hospital Problems   No resolved problems to display.       74 y.o. female admitted with biliary obstruction.    Biliary obstruction due to stricture  Pancreatic adenoCA  -ERCP unsuccessful on 11/21, second attempt on 11/24 was successful and stent placed  - elevated at 904  -path showed adenoCA  - Oncologist discussed imaging, diagnosis, and management with pt and niece.  Pt has opted to not pursue palliative chemotherapy but rather go home / facility with hospice support.   - Oncologist has placed hospice consult.   -Gen Surg placed port 11/29  -tolerating regular diet  -LFTs, TBili, and AlkPhos all improved  - will change norco to roxicodone for breakthrough pain given prior issues with elevated LFT's     Chronic osteomyelitis associated with chronic stage 4 sacral decubitus ulcer  -PCT wnl, CRP elevated on admit 11/20  -pictures of wound in chart  -ID consulted and recommend no antibiotics, f/u outpatient with wound care  -She was seen by wound care here in the hospital and they recommend: \"Cleanse with NS - using q tip placed a piece of opticel into the wound space - apply a 4x4 or 6x6 optifoam by folding and adequately placing in the gluteal cleft assuring the foam portion of the dressing makes contact with skin; change every other day\"  - Add Glucerna strawberry BID to trays to help with nutrition intake.       Parox Atrial fibrillation  Chronic AC (Eliquis)  - 12 lead EKG 11/23 SR.   -metoprolol on hold for now. BPs are on the low side 's systolic.   -continue digoxin  -HRs well controlled.    -patient has been seen by Cardiology in the hospital  -continue aspirin  -GI okay with restarting " Eliquis 11/25.    - Port was not placed as planned yesterday while awaiting goals of care discussion.  Will resume eliquis was on hold for possible port placement. Last dose 11/28.      PAD  Bilateral AKA  - continue aspirin and Pletal  - Plt drop 11/29 to 138,000 but normalized 11/30.      Seizure history  -continue Keppra  -no seizure activity here     Type 2 DM  - sugars high as po intake increasing  -11/29 Lantus increased to 70 units  - continue SSI  - A1c 8.2  - was on jardiance prior to admit.     Chronic hypoxic resp failure  -stable on 2L/min by NC    Hypophosphatemia/ Hypokalemia  -replaced with protocol  - recheck BMP and phos in am.     Itching ears  -trial of Zyrtec    Chronic constipation  - continue sennakot and miralax.  - add lactulose BID until BM achieved.           Eliquis should suffice for DVT prophylaxis.  Full code.  Discussed with patient.  Anticipate discharge back to SNU facility on Saturday.  Expected Discharge Date: 11/30/2024; Expected Discharge Time:       MASOUD Duckworth  [facility]  11/30/24  13:32 EST

## 2024-11-30 NOTE — CONSULTS
Met with pt at bedside with corie on phone and provided explanation of services. Plan is back to LTC with hospice. Pt verbalized DNR. Does not want to pursue chemo. Goal is comfort. [facility] to follow up at [facility]. If any questions please let us know.    Thank you for the referral.    Mayuri Hill RN  [facility]  [phone]

## 2024-11-30 NOTE — CASE MANAGEMENT/SOCIAL WORK
Continued Stay Note  [facility]     Patient Name: Chantal Kumar  MRN: [id number]  Today's Date: 11/30/2024    Admit Date: 11/20/2024    Plan: From/Return to [facility]- Medicaid bed pending approval of chemo treatment plan   Discharge Plan       Row Name 11/30/24 1531       Plan    Plan Comments CCP received incoming call from RN that patient's plan changed to returning to [facility] with Hospice and needs transportation arranged. Hospice consulted with patient and family today. CCP confirmed with Marilyn/RN at [facility] ([phone]) that patient able to return and notes plan for Hospice. CCP arranged [facility] at 8 PM tonight and form faxed to unit. Facility ok with late transport. RN and MD updated. Marilyn requests DC summary be faxed to [phone] or [phone]. Info relayed to RN. [facility] CCP                   Discharge Codes    No documentation.                 Expected Discharge Date and Time       Expected Discharge Date Expected Discharge Time    Nov 30, 2024               [place] RN

## 2024-11-30 NOTE — PROGRESS NOTES
[facility] GROUP INPATIENT PROGRESS NOTE    Length of Stay:  10 days    CHIEF COMPLAINT/REASON FOR VISIT:  Pancreatic adenocarcinoma  Pulmonary nodules    SUBJECTIVE:   11/30/2024:   Patient was eating breakfast at the time of my visit today.  She reports feeling okay overall.  I had a long discussion with the patient and her niece who was over the phone, details under assessment and plan.    ROS:  All systems reviewed and found to be negative except for that noted above    OBJECTIVE:  Vitals:    11/29/24 1233 11/29/24 1731 11/29/24 2122 11/30/24 0540   BP:  99/57 114/58 104/56   BP Location:  Left arm Left arm Left arm   Patient Position:  Lying Lying Lying   Pulse: 71 66 87 84   Resp:  16 16 16   Temp:  97.3 °F (36.3 °C) 97.4 °F (36.3 °C) 97.6 °F (36.4 °C)   TempSrc:  Oral Oral Oral   SpO2:  97% 94% 95%   Weight:       Height:             PHYSICAL EXAMINATION:  General: Obese female in no acute distress.  AOx3  Chest/Lungs: CTABL.  No Rales rhonchi wheezing  Heart: S1-S2 normal.  No murmurs  Abdomen/GI: Soft, bowel sounds present  Extremities: Status post bilateral AKA    I have reexamined the patient and the results are consistent with the previously documented exam. Fay Ramon MD     DIAGNOSTIC DATA:  Results Review:     I reviewed the patient's new clinical results.    Results from last 7 days   Lab Units 11/30/24  0453 11/29/24  0806 11/28/24  0614   WBC 10*3/mm3 4.33 4.57 4.12   HEMOGLOBIN g/dL 10.8* 10.8* 10.7*   HEMATOCRIT % 34.8 34.6 33.3*   PLATELETS 10*3/mm3 149 138* 155      Results from last 7 days   Lab Units 11/30/24  0453 11/29/24  0806 11/28/24  0614   SODIUM mmol/L 141 137 135*   POTASSIUM mmol/L 3.4* 3.8 3.9   CHLORIDE mmol/L 105 105 104   CO2 mmol/L 26.4 26.0 26.0   BUN mg/dL 16 15 13   CREATININE mg/dL 0.67 0.69 0.70   CALCIUM mg/dL 8.6 8.8 8.7   BILIRUBIN mg/dL 0.8 0.9 0.9   ALK PHOS U/L 241* 272* 301*   ALT (SGPT) U/L 22 26 28   AST (SGOT) U/L 15 15 17   GLUCOSE mg/dL 240* 260*  263*      Lab Results   Component Value Date    NEUTROABS 3.57 11/26/2024     Results from last 7 days   Lab Units 11/23/24  0626   INR  1.27*     Results from last 7 days   Lab Units 11/30/24  0453   MAGNESIUM mg/dL 1.9       Assessment & Plan   ASSESSMENT/PLAN:  This is a 74 y.o. female with:     *Adenocarcinoma, likely pancreatic   *Multiple bilateral pulmonary nodules, subcentimeter  *Poor performance status, ECOG 3-most likely secondary to bilateral AKA and other medical comorbidities  Presented with abdominal discomfort, found to have total bilirubin elevated at 5.2  11/21/2022 ERCP with unsuccessful cannulization.  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  11/21/2024 Ca 19-9 - 904   11/22/2024-CT abdomen-a 2.9 cm hypoenhancing pancreatic head mass concerning for pancreatic adenocarcinoma.  Mass encases and severely narrows main portal vein at the portal venous confluence.  Mass causes moderate intra and extrahepatic biliary ductal dilatation.  No abdominal lymphadenopathy.  No abdominal omental or peritoneal nodularity.   11/24/2024 ERCP with sphincterotomy, brushing and stent placement by Dr. Malave.  IR consult was then canceled.    11/24/2024 brushings of the CBD revealed atypical ductal cells Suspicious for well-differentiated adenocarcinoma  11/27/2024 CT chest-multiple right greater than left bilateral solid subcentimeter pulmonary nodules measuring up to 8 mm.  Pulmonary metastatic disease cannot be excluded.  11/27/2024-I have reviewed the imaging, pathology and discussed the diagnosis and management with the patient.  She has a 2.9 cm hypoenhancing pancreatic head mass, elevated CA 19-9, and CBD brushings suspicious for well-differentiated adenocarcinoma.  She likely has pancreatic adenocarcinoma.  I have discussed next steps in management-obtaining a PET scan as an outpatient to complete staging; and referral to surgical oncology at [facility] for surgical management opinion.   Unfortunately, patient is a nursing home resident.  She does have poor performance status mainly due to her medical comorbidities and bilateral AKA.  She seems to be a poor surgical candidate due to her medical comorbidities.  Would likely consider palliative chemo with Gemzar dose reduced to 800 mg/m² day 1 and day 15 of a 28-day cycle for better tolerance.  Reviewed possible side effects of chemotherapy including but not limited to cytopenia, febrile neutropenia, nausea, vomiting, hair loss.  11/28/2024: Patient is planned for port placement on Friday.  We will need patient's height prior to proceeding with chemotherapy plan.  I have reached out to bedside RN to obtain her height  11/29/2024: She is planned for port placement today.  We will plan to start Gemzar at 800 mg/m² as outpatient on day 1 and day 15 of a 28-day cycle.  She will be given appointments for chemo education.  11/30/2024: Long discussion had with patient and niece (over phone) -I reviewed patient's imaging, diagnosis, management.  Discussed palliative intent single agent chemotherapy with Gemzar.  Rereviewed possible side effects including but not limited to cytopenia, febrile neutropenia, immunosuppression, nausea, vomiting, hair loss.  Further discussed that the patient's niece's concerns regarding possible infection of her stage IV decub ulcer while on chemotherapy are quite valid.  Patient understands that chemotherapy may hamper her quality of life especially with her poor performance status and other medical comorbidities.  As such patient and niece together chose to focus on quality of life, and go home/to facility with hospice.  They were very appreciative of the care    *Elevated T bilirubin, resolved with CBD stent placement     *Anemia of chronic disease  Upon lab review she has had anemia since at least January 2022 with hemoglobin around 10.  11/27/2024 hemoglobin 10.8.  Lab evaluation consistent with anemia of chronic  disease  11/30/2024: Hb stable at 10.8     *Chronic osteomyelitis with chronic stage IV sacral decub ulcer  No antibiotics recommended per infectious disease.     *Other medical comorbidities-diabetes type 2, hypertension, hyperlipidemia, bilateral above-knee amputations, A-fib on Eliquis     RECOMMENDATIONS/PLAN:   Had a long discussion with the patient and niece today.  Details as mentioned above  Patient has decided to go home/to facility with hospice  Hospice consult placed  Nothing else to add from hematology standpoint.  We will sign off.    Fay Ramon MD

## 2024-11-30 NOTE — DISCHARGE SUMMARY
Patient Name: Chantal Kumar  : 1950  MRN: [id number]    Date of Admission: 2024  Date of Discharge:  2024  Primary Care Physician: Sasha Bui MD      Chief Complaint:   No chief complaint on file.      Discharge Diagnoses     Active Hospital Problems    Diagnosis  POA    Chronic heart failure with preserved ejection fraction (HFpEF) [I50.32]  Yes    Pancreatic adenocarcinoma [C25.9]  Yes    Malignant neoplasm of other parts of pancreas [C25.7]  Unknown    Persistent atrial fibrillation [I48.19]  Yes    S/P AKA (above knee amputation) bilateral [Z89.611, Z89.612]  Not Applicable    Chronic diastolic CHF (congestive heart failure) [I50.32]  Yes    History of CVA (cerebrovascular accident) [Z86.73]  Not Applicable    Bipolar disorder, unspecified [F31.9]  Yes    Secondary hypertension [I15.9]  Yes    Type 2 diabetes mellitus with diabetic neuropathy, unspecified [E11.40]  Yes      Resolved Hospital Problems    Diagnosis Date Resolved POA    **Bile duct obstruction [K83.1] 2024 Yes        Hospital Course     Ms. Kumar is a 74 y.o. female with a history of diabetes, anxiety, COPD, depression, diabetes mellitus type 2, GERD, hypertension, seizure disorder, prior CVA, paroxysmal A-fib, osteoporosis, borderline personality disorder, bipolar disorder, pressure ulcer stage IV, and COPD who presented to [facility] with abdominal discomfort.  She was evaluated the emergency room and CT scan showed dilated intra and extrahepatic biliary tree and a distal common bile duct obstruction from a pancreatic mass.   CT of the abdomen obtained on 2024 showed a 2.9 cm hypoenhancing pancreatic head mass concerning for pancreatic adenocarcinoma.  The mass encases and severely narrows main portal vein at the portal venous confluence, mass causes moderate intra and extrahepatic biliary ductal dilation.  No abdominal omental or peritoneal nodularity was noted.  GI was consulted and on  11/21/2024 she underwent ERCP with a sphincterotomy with unsuccessful cannulation of the common bile duct brushing and stent placement by Dr. Malave.  She subsequently underwent repeat ERCP with sphincterotomy, brushings and stent placement on 11/24/24.  The brushings of the common bile duct revealed atypical ductal cells suspicious for well differentiated adenocarcinoma.  CT of the chest was obtained which showed multiple right greater than left bilateral solid subcentimeter pulmonary nodules measuring up to 8 mm concerning for pulmonary metastatic disease.      After oncologist's discussion with patient initial plan was to pursue further workup with PET scan to assess staging and then referral to surgical oncology at [facility] for surgical management opinion.  Because of the patient's comorbidities and poor functional status due to bilateral AKA it was felt that she would likely be a poor surgical candidate due to her medical comorbidities.  The discussion then shifted to palliative chemotherapy with Gemzar.  Side effects of chemotherapy were discussed with patient as well as her niece.  Niece had significant concerns about the patient's ability to tolerate chemotherapy given her poor functional status and comorbidities and was concerned about patient being immunocompromised.  Patient has chronic osteomyelitis with chronic stage IV sacral decubitus ulcer and niece was concerned about risk for infection if the patient were to undergo chemotherapy and become immunocompromise.  Ultimately today the patient and niece had a goals of care discussion with oncologist who reviewed imaging and management strategies and patient opted to return to her long-term care facility with hospice support.  During the course of her stay she did have a episode of A-fib with RVR and cardiology was consulted.  They recommended restarting DOAC once cleared by other consultants and continue her metoprolol tartrate 25 mg twice daily.  However  metoprolol was held given low blood pressures in the upper 90s to 100s systolic.  She remains on digoxin.    Liver enzymes continue to trend down as well as her bilirubin over the course of her stay.  Infectious disease was consulted regarding chronic sacral decubitus ulcer with osteomyelitis.  ID recommended holding antibiotics.    Pain has been adequately controlled with tramadol.  Blood sugars have been somewhat elevated now that appetite has returned.  Her basal insulin was adjusted to 70 units.  At discharge she will be adjusted back down to 60 units basal with her usual sliding scale from nursing home.  She will resume her Jardiance that she was taking prior to admit to the hospital.  Labs on the day of discharge revealed potassium 3.4 for which she has been replaced x 2.  Liver function test AST and ALT.ALT Dave phosphatase is elevated to 41 but overall shows trend down.  CBC on the day of discharge shows a normal white cell count at 4.33.  Hemoglobin is 10.8 which has been stable over the course of the last 6 days.  Platelet count is normal at 149,000.  She will be discharged on her same medication regimen except for the addition of Zofran for any nausea she might experience.  Her QTc was noted to be normal during her stay here.  Hospice consult was placed today at patient and family's request.  Hospice did meet with the patient at bedside in the knee's via phone and provided an explanation of service.  Plan is to return to long-term care facility with hospice support.  Patient verbalized her desire for DNR.  Does not want to pursue chemotherapy with her goal being comfort and quality of life.  Hospice will follow-up [facility].    She is stable from a medical standpoint to discharge to long-term care with hospice support.  [facility] has made arrangements for her medical transport later this evening to her nursing and rehab facility.          Day of Discharge     Subjective:  Appetite fair.  Reports increased right sided Upper quad pain at times. RN reports she is getting good rest following tramadol dosing. Denies n/v, chest pain/ pressure, shoa.     Physical Exam:  Temp:  [97.3 °F (36.3 °C)-97.6 °F (36.4 °C)] 97.4 °F (36.3 °C)  Heart Rate:  [66-88] 88  Resp:  [16] 16  BP: ()/(56-67) 96/67  Body mass index is 77.79 kg/m².      Physical Exam  Vitals and nursing note reviewed.   Constitutional:       General: She is not in acute distress.     Appearance: She is obese. She is not ill-appearing.      Comments: Sitting up in bed eating lunch.    HENT:      Head: Normocephalic.      Nose: Nose normal.      Mouth/Throat:      Mouth: Mucous membranes are moist.      Pharynx: Oropharynx is clear.   Eyes:      General: No scleral icterus.        Right eye: No discharge.         Left eye: No discharge.      Conjunctiva/sclera: Conjunctivae normal.   Cardiovascular:      Rate and Rhythm: Normal rate and regular rhythm.   Pulmonary:      Effort: Pulmonary effort is normal. No respiratory distress.      Breath sounds: Normal breath sounds. No wheezing or rales.      Comments: Poor inspiratory effort.   Abdominal:      General: Bowel sounds are normal. There is no distension.      Palpations: Abdomen is soft.      Tenderness: There is no abdominal tenderness.   Musculoskeletal:      Cervical back: Neck supple.      Right lower leg: No edema.      Left lower leg: No edema.      Comments: S/p bilateral AKA   Skin:     General: Skin is warm and dry.      Capillary Refill: Capillary refill takes less than 2 seconds.      Coloration: Skin is not jaundiced.   Neurological:      General: No focal deficit present.      Mental Status: She is alert and oriented to person, place, and time. Mental status is at baseline.      Cranial Nerves: No cranial nerve deficit.   Psychiatric:         Behavior: Behavior normal.         Thought Content: Thought content normal.      Comments: Flat affect        Consultants     Consult  Orders (all) (From admission, onward)       Start     Ordered    11/30/24 1447  Inpatient Case Management  Consult  Once        Provider:  (Not yet assigned)    11/30/24 1447    11/30/24 0919  Inpatient Hospice / [facility] Consult  Once        Specialty:  Hospice and Palliative Medicine  Provider:  (Not yet assigned)    11/30/24 0918    11/29/24 1301  Inpatient Palliative Care Team Consult  Once        Provider:  (Not yet assigned)    11/29/24 1301    11/27/24 1420  Inpatient General Surgery Consult For Implanted Port Placement  Once        Specialty:  General Surgery  Provider:  Shade Lacy MD    11/27/24 1419    11/27/24 0846  Hematology & Oncology Inpatient Consult  Once        Specialty:  Hematology and Oncology  Provider:  Fay Ramon MD    11/27/24 0845    11/23/24 1051  Inpatient Cardiology Consult  Once        Specialty:  Cardiology  Provider:  Humphrey Giordano MD    11/23/24 1051    11/21/24 1146  Inpatient Advance Care Planning Consult  Once        Provider:  (Not yet assigned)    11/21/24 1145    11/20/24 1800  Inpatient Infectious Diseases Consult  Once        Specialty:  Infectious Diseases  Provider:  Connor Mcginnis MD    11/20/24 1759    11/20/24 1215  Inpatient Gastroenterology Consult  Once        Specialty:  Gastroenterology  Provider:  Angel Castaneda MD    11/20/24 1214                  Procedures     ENDOSCOPIC RETROGRADE CHOLANGIOPANCREATOGRAPHY WITH SPHINCTEROTOMY, BRUSHINGS, AND STENT PLACEMENT    Imaging Results (All)       Procedure Component Value Units Date/Time    CT Chest With Contrast Diagnostic [[id number]] Collected: 11/27/24 1307     Updated: 11/27/24 1326    Narrative:      CT CHEST WITH IV CONTRAST     HISTORY: Recently diagnosed presumed pancreatic adenocarcinoma. Staging.     TECHNIQUE: Radiation dose reduction techniques were utilized, including  automated exposure control and exposure modulation based on body size.   3 mm images were  obtained through the chest after the administration of  IV contrast.     COMPARISON: CT abdomen pelvis 11/22/2024        FINDINGS: Partially visualized internal biliary stent. Resolution of  prior biliary dilation with pneumobilia now present. Pancreatic head  mass best characterized on recent CT. Splenomegaly (15 cm). Heart is  normal in size. Severe coronary artery calcifications. Main pulmonary  artery at the upper limits of normal (29 mm). Nondilated thoracic aorta.  No mediastinal/hilar lymphadenopathy. Patulous esophagus.     Left hemidiaphragm elevation. Trace bilateral pleural effusions.  Multisegmental left greater than right bilateral lower lobe relaxation  atelectasis. Trachea and remaining main bronchi are patent.      Multiple right greater than left bilateral solid pulmonary nodules.     Reference nodules:  *  8 mm right upper lobe (series 3/image 47)  *  6 mm right upper lobe (3/24)  *  Sub-6 mm lingular (3/40)  *  Sub-6 mm right lower lobe (3/47)  *  Sub-6 mm right lower lobe (3/48)  *  Sub-6 mm left upper lobe (3/18             Impression:      Multiple right greater than left bilateral solid  subcentimeter pulmonary nodules measuring up to 8 mm will need continued  follow-up as pulmonary metastatic disease cannot be excluded. Outside  prior imaging of the chest, if available, would be helpful to  demonstrate nodule stability.     This report was finalized on 11/27/2024 1:22 PM by Dr. Grady Sandoval M.D on Workstation: [id number]       FL ercp biliary duct only [[id number]] Collected: 11/24/24 1522     Updated: 11/25/24 2200    Narrative:      ERCP INTERPRETATION ONLY 11/24/2024     HISTORY: ERCP.     There is contrast in the intrahepatic, common hepatic, and common bile  ducts. There is moderate biliary dilatation. There is a segment of the  common duct which is incompletely distended and narrowed for a length of  10 mm to 15 mm. A nonexpandable biliary stent is seen coursing into the  common  duct.        FLUOROSCOPY TIME:  2 minutes, 26 seconds  ,  4   images, 2631 dose area  product.     This report was finalized on 11/25/2024 9:57 PM by Dr. Samy Zavala M.D on Workstation: [id number]       CT Abdomen With & Without Contrast [[id number]] Collected: 11/22/24 2001     Updated: 11/22/24 2020    Narrative:      CT ABDOMEN W WO CONTRAST-     HISTORY: Biliary obstruction from pancreatic mass.     TECHNIQUE: Radiation dose reduction techniques were utilized, including  automated exposure control and exposure modulation based on body size.   3 mm images were obtained through the abdomen after the administration  of IV contrast.     COMPARISON: None available        FINDINGS: Chronic appearing L5, L3 and L1 compression fractures with  approximately 50%, 25% and 50% vertebral body height loss respectively.  Left hemidiaphragm elevation. Spleen and colonic splenic flexure are  partially outside the exam field-of-view. A 1.4 cm right renal lesion  shows no enhancement suggesting a hemorrhagic/proteinaceous cyst. Few  subcentimeter hypodense renal lesions too small to characterize. A 2.9 x  2.9 cm hypoenhancing pancreatic head mass (series 6/image 43). Resulting  more distal main pancreatic duct dilation measuring up to 1.4 cm (series  6/image 38) and distal pancreas parenchymal atrophy. Mass causes  moderate intra and extrahepatic biliary duct dilation (series 6/images  23 and 38). Post cholecystectomy. Mass encases and severely narrows the  main portal vein at the portal venous confluence (series 8/image 50). No  vascular interface with the celiac artery or SMA. Noncalcified  atherosclerotic plaque in the proximal SMA. More distal SMA is patent.  No abdominal lymphadenopathy. No focal hepatic lesion. No abdominal  omental or peritoneal nodularity. Remaining solid organs and visualized  bowel are normal.          Impression:      1. A 2.9 cm hypoenhancing pancreatic head mass is concerning for  pancreatic  adenocarcinoma. Recommend EUS/FNA. Mass encases and severely  narrows the main portal vein at the portal venous confluence. Mass  causes moderate intra and extrahepatic bili duct dilation.  2. No abdominal lymphadenopathy. No focal hepatic lesion. No abdominal  omental or peritoneal nodularity.     This report was finalized on 11/22/2024 8:17 PM by Dr. Grady Sandoval M.D on Workstation: [id number]       FL ercp biliary duct only [[id number]] Collected: 11/21/24 1854     Updated: 11/21/24 1905    Narrative:      ERCP INTERPRETATION ONLY 11/21/2024     HISTORY: Attempted ERCP.     FINDINGS: Single limited field-of-view image of the abdomen is  submitted. Endoscope is seen with a small focus of irregular increased  density probably some injected contrast. No contrast is seen within the  biliary system on this image.     Fluoroscopy time 4 minutes 9 seconds 1 image 4104 dose area product                 This report was finalized on 11/21/2024 7:02 PM by Dr. Samy Zavala M.D on Workstation: [id number]               Results for orders placed during the hospital encounter of 01/17/22    Adult Transthoracic Echo Complete W/ Cont if Necessary Per Protocol    Interpretation Summary  · Estimated right ventricular systolic pressure from tricuspid regurgitation is normal (<35 mmHg). Calculated right ventricular systolic pressure from tricuspid regurgitation is 20 mmHg.  · Left ventricular wall thickness is consistent with mild concentric hypertrophy.  · Left ventricular ejection fraction appears to be 51 - 55%. Left ventricular systolic function is low normal.  · Left ventricular diastolic function was indeterminate.  · Technically difficult study due to patient positioning, limited acoustic windows    Pertinent Labs     Results from last 7 days   Lab Units 11/30/24  0453 11/29/24  0806 11/28/24  0614 11/27/24  0624   WBC 10*3/mm3 4.33 4.57 4.12 5.28   HEMOGLOBIN g/dL 10.8* 10.8* 10.7* 10.8*   PLATELETS 10*3/mm3  "149 138* 155 140     Results from last 7 days   Lab Units 11/30/24  0453 11/29/24  0806 11/28/24  0614 11/27/24  0624   SODIUM mmol/L 141 137 135* 132*   POTASSIUM mmol/L 3.4* 3.8 3.9 4.2   CHLORIDE mmol/L 105 105 104 103   CO2 mmol/L 26.4 26.0 26.0 22.0   BUN mg/dL 16 15 13 10   CREATININE mg/dL 0.67 0.69 0.70 0.71   GLUCOSE mg/dL 240* 260* 263* 338*   EGFR mL/min/1.73 91.8 91.2 90.9 89.4     Results from last 7 days   Lab Units 11/30/24  0453 11/29/24  0806 11/28/24  0614 11/27/24  0624   ALBUMIN g/dL 2.6* 2.7* 2.6* 2.5*   BILIRUBIN mg/dL 0.8 0.9 0.9 1.0   ALK PHOS U/L 241* 272* 301* 354*   AST (SGOT) U/L 15 15 17 14   ALT (SGPT) U/L 22 26 28 33     Results from last 7 days   Lab Units 11/30/24  0453 11/29/24  0806 11/28/24  0614 11/27/24  0624   CALCIUM mg/dL 8.6 8.8 8.7 8.3*   ALBUMIN g/dL 2.6* 2.7* 2.6* 2.5*   MAGNESIUM mg/dL 1.9 2.1 2.1 2.4   PHOSPHORUS mg/dL 3.5 3.0 2.7 2.6       Results from last 7 days   Lab Units 11/24/24  1543   DIGOXIN LVL ng/mL 0.50*           Invalid input(s): \"LDLCALC\"          Test Results Pending at Discharge     Pending Results       Procedure [Order ID] Specimen - Date/Time    Potassium [[id number]] Collected: 11/30/24 1622    Specimen: Blood               Discharge Details        Discharge Medications        New Medications        Instructions Start Date   cetirizine 10 MG tablet  Commonly known as: zyrTEC   5 mg, Oral, Daily      ondansetron ODT 4 MG disintegrating tablet  Commonly known as: ZOFRAN-ODT   4 mg, Oral, Every 8 Hours PRN      sennosides-docusate 8.6-50 MG per tablet  Commonly known as: PERICOLACE   2 tablets, Oral, 2 Times Daily             Changes to Medications        Instructions Start Date   atorvastatin 10 MG tablet  Commonly known as: LIPITOR  What changed: Another medication with the same name was removed. Continue taking this medication, and follow the directions you see here.   Take  by mouth.      tiZANidine 2 MG tablet  Commonly known as: ZANAFLEX  What " changed:   when to take this  reasons to take this   2 mg, Oral, Every 8 Hours PRN             Continue These Medications        Instructions Start Date   apixaban 5 MG tablet tablet  Commonly known as: ELIQUIS   5 mg, Oral, Every 12 Hours Scheduled      ascorbic acid 500 MG tablet  Commonly known as: VITAMIN C   500 mg, Daily      aspirin 81 MG EC tablet   81 mg, Daily      baclofen 10 MG tablet  Commonly known as: LIORESAL   10 mg, 2 Times Daily      buPROPion  MG 24 hr tablet  Commonly known as: WELLBUTRIN XL   150 mg, Daily      busPIRone 15 MG tablet  Commonly known as: BUSPAR   15 mg, 3 Times Daily      cilostazol 100 MG tablet  Commonly known as: PLETAL   100 mg, 2 Times Daily      colestipol 1 g tablet  Commonly known as: COLESTID   1 g, 2 Times Daily      digoxin 125 MCG tablet  Commonly known as: LANOXIN   125 mcg, Daily Digoxin      Ferrous Fumarate 324 (106 Fe) MG tablet   324 mg, Daily      furosemide 20 MG tablet  Commonly known as: LASIX   20 mg      insulin glargine 100 UNIT/ML injection  Commonly known as: LANTUS, SEMGLEE   60 Units, Subcutaneous, Nightly      insulin lispro 100 UNIT/ML injection  Commonly known as: humaLOG   3 Times Daily Before Meals      Jardiance 10 MG tablet tablet  Generic drug: empagliflozin   10 mg, Daily      l-methylfolate calcium 7.5 MG tablet tablet  Commonly known as: DEPLIN   7.5 mg, Daily      levETIRAcetam 500 MG tablet  Commonly known as: KEPPRA   500 mg, 2 Times Daily      metoprolol tartrate 100 MG tablet  Commonly known as: LOPRESSOR   100 mg, 2 Times Daily      milnacipran 50 MG tablet tablet  Commonly known as: SAVELLA   50 mg, Every 12 Hours Scheduled      multivitamin with minerals tablet tablet   1 tablet, Daily      potassium chloride 10 MEQ CR tablet  Commonly known as: KLOR-CON M10   20 mEq, 2 Times Daily      prazosin 2 MG capsule  Commonly known as: MINIPRESS   2 mg, Nightly      QUEtiapine 100 MG tablet  Commonly known as: SEROquel   100 mg,  Oral, Nightly      traMADol 50 MG tablet  Commonly known as: ULTRAM   50 mg, Every 8 Hours PRN      traZODone 50 MG tablet  Commonly known as: DESYREL   50 mg, Oral               No Known Allergies    Discharge Disposition:  Skilled Nursing Facility (DC - External)      Discharge Diet:  Diet Order   Procedures    Diet: Regular/House, Diabetic, Gastrointestinal; Consistent Carbohydrate; Fat-Restricted; Texture: Regular (IDDSI 7); Fluid Consistency: Thin (IDDSI 0)       Discharge Activity:   Activity Instructions       Gradually Increase Activity Until at Pre-Hospitalization Level              CODE STATUS:    Code Status and Medical Interventions: CPR (Attempt to Resuscitate); Full Support   Ordered at: 11/22/24 0652     Code Status (Patient has no pulse and is not breathing):    CPR (Attempt to Resuscitate)     Medical Interventions (Patient has pulse or is breathing):    Full Support       Future Appointments   Date Time Provider Department Center   12/12/2024  2:40 PM LAB CHAIR [facility]  LAB [facility] [place]   12/12/2024  3:00 PM Clara Quiñonez APRN K [facility] [place]   12/19/2024  7:30 AM INFU [facility] PORT CHAIR  INFUS [facility]   12/19/2024  8:00 AM Fay Ramon MD [facility] [place]   12/19/2024  8:45 AM CHAIR 20 [facility] INFUS [facility]      Contact information for follow-up providers       Sasha Bui MD .    Specialty: Family Medicine  Contact information:  [address]  [facility] [address] [phone]                       Contact information for after-discharge care       Destination       [facility] .    Service: Nursing Home  Contact information:  [address] [place] [address] [phone]                                   Time Spent on Discharge:  Greater than 30 minutes      MASOUD Duckworth  [facility]  11/30/24  16:16 EST

## 2024-12-01 VITALS
OXYGEN SATURATION: 98 % | HEIGHT: 55 IN | HEART RATE: 72 BPM | TEMPERATURE: 97.6 F | BODY MASS INDEX: 43.05 KG/M2 | SYSTOLIC BLOOD PRESSURE: 91 MMHG | DIASTOLIC BLOOD PRESSURE: 55 MMHG | WEIGHT: 186 LBS | RESPIRATION RATE: 12 BRPM

## 2024-12-01 LAB — GLUCOSE BLDC GLUCOMTR-MCNC: 259 MG/DL (ref 70–130)

## 2024-12-01 PROCEDURE — 82948 REAGENT STRIP/BLOOD GLUCOSE: CPT

## 2024-12-01 PROCEDURE — 63710000001 INSULIN LISPRO (HUMAN) PER 5 UNITS: Performed by: NURSE PRACTITIONER

## 2024-12-01 RX ADMIN — CETIRIZINE HYDROCHLORIDE 10 MG: 10 TABLET, FILM COATED ORAL at 08:44

## 2024-12-01 RX ADMIN — FERROUS SULFATE TAB 325 MG (65 MG ELEMENTAL FE) 325 MG: 325 (65 FE) TAB at 08:43

## 2024-12-01 RX ADMIN — CILOSTAZOL 100 MG: 100 TABLET ORAL at 08:43

## 2024-12-01 RX ADMIN — Medication 1 TABLET: at 08:43

## 2024-12-01 RX ADMIN — BUSPIRONE HYDROCHLORIDE 15 MG: 15 TABLET ORAL at 08:43

## 2024-12-01 RX ADMIN — Medication 10 ML: at 09:02

## 2024-12-01 RX ADMIN — APIXABAN 5 MG: 5 TABLET, FILM COATED ORAL at 08:44

## 2024-12-01 RX ADMIN — ASPIRIN 81 MG: 81 TABLET, COATED ORAL at 08:43

## 2024-12-01 RX ADMIN — BUPROPION HYDROCHLORIDE 150 MG: 150 TABLET, EXTENDED RELEASE ORAL at 08:43

## 2024-12-01 RX ADMIN — CHOLESTYRAMINE 4 G: 4 POWDER, FOR SUSPENSION ORAL at 08:44

## 2024-12-01 RX ADMIN — MILNACIPRAN HYDROCHLORIDE 50 MG: 50 TABLET, FILM COATED ORAL at 08:43

## 2024-12-01 RX ADMIN — LACTULOSE 10 G: 10 SOLUTION ORAL at 08:44

## 2024-12-01 RX ADMIN — BACLOFEN 10 MG: 10 TABLET ORAL at 08:43

## 2024-12-01 RX ADMIN — ATORVASTATIN CALCIUM 10 MG: 20 TABLET, FILM COATED ORAL at 08:44

## 2024-12-01 RX ADMIN — POLYETHYLENE GLYCOL 3350 17 G: 17 POWDER, FOR SOLUTION ORAL at 08:44

## 2024-12-01 RX ADMIN — SENNOSIDES AND DOCUSATE SODIUM 2 TABLET: 50; 8.6 TABLET ORAL at 09:02

## 2024-12-01 RX ADMIN — LEVETIRACETAM 500 MG: 500 TABLET, FILM COATED ORAL at 08:43

## 2024-12-01 RX ADMIN — INSULIN LISPRO 6 UNITS: 100 INJECTION, SOLUTION INTRAVENOUS; SUBCUTANEOUS at 07:52

## 2024-12-01 NOTE — CASE MANAGEMENT/SOCIAL WORK
Continued Stay Note  [facility]     Patient Name: Chantal Kumar  MRN: [id number]  Today's Date: 12/1/2024    Admit Date: 11/20/2024    Plan: Return to [facility] via [facility] today at 11:45am   Discharge Plan       Row Name 12/01/24 0951       Plan    Plan Return to [facility] via [facility] today at 11:45am    Plan Comments Inbound call from RN notifying CCP that discharge was canceled yesterday and they need transport rescheduled for today. Scheduled with [facility] For 11:45am today. RN notified. Ayush BOJORQUEZ                   Discharge Codes    No documentation.                 Expected Discharge Date and Time       Expected Discharge Date Expected Discharge Time    Dec 1, 2024               Ayush Zheng RN

## 2024-12-01 NOTE — PROGRESS NOTES
Brief note:     Pt's blood sugar up 513 from 464 after 9 units sliding scale.  Pt reported to RN that she was given a bundt cake by Hospice  earlier today which she ate and has been drinking coke rather than diet coke this afternoon.  Will give another 5 units SSI now and recheck in 1 hour.  RN discussed with pt and staff need for diet soda and limit/ avoid sugary foods given her blood sugar elevation.  She is on a consistent carbohydrate diet diabetic features currently- MASOUD Boyle

## 2024-12-01 NOTE — DISCHARGE SUMMARY
Patient Name: Chantal Kumar  : 1950  MRN: [id number]    Date of Admission: 2024  Date of Discharge:  2024  Primary Care Physician: Sasha Bui MD      Chief Complaint:   No chief complaint on file.      Discharge Diagnoses     Active Hospital Problems    Diagnosis  POA    Chronic heart failure with preserved ejection fraction (HFpEF) [I50.32]  Yes    Pancreatic adenocarcinoma [C25.9]  Yes    Malignant neoplasm of other parts of pancreas [C25.7]  Unknown    Persistent atrial fibrillation [I48.19]  Yes    S/P AKA (above knee amputation) bilateral [Z89.611, Z89.612]  Not Applicable    Chronic diastolic CHF (congestive heart failure) [I50.32]  Yes    History of CVA (cerebrovascular accident) [Z86.73]  Not Applicable    Bipolar disorder, unspecified [F31.9]  Yes    Secondary hypertension [I15.9]  Yes    Type 2 diabetes mellitus with diabetic neuropathy, unspecified [E11.40]  Yes      Resolved Hospital Problems    Diagnosis Date Resolved POA    **Bile duct obstruction [K83.1] 2024 Yes        Hospital Course     Ms. Kumar is a 74 y.o. female with a history of diabetes, anxiety, COPD, depression, diabetes mellitus type 2, GERD, hypertension, seizure disorder, prior CVA, paroxysmal A-fib, osteoporosis, borderline personality disorder, bipolar disorder, pressure ulcer stage IV, and COPD who presented to [facility] with abdominal discomfort.  She was evaluated the emergency room and CT scan showed dilated intra and extrahepatic biliary tree and a distal common bile duct obstruction from a pancreatic mass.   CT of the abdomen obtained on 2024 showed a 2.9 cm hypoenhancing pancreatic head mass concerning for pancreatic adenocarcinoma.  The mass encases and severely narrows main portal vein at the portal venous confluence, mass causes moderate intra and extrahepatic biliary ductal dilation.  No abdominal omental or peritoneal nodularity was noted.  GI was consulted and on  11/21/2024 she underwent ERCP with a sphincterotomy with unsuccessful cannulation of the common bile duct brushing and stent placement by Dr. Malave.  She subsequently underwent repeat ERCP with sphincterotomy, brushings and stent placement on 11/24/24.  The brushings of the common bile duct revealed atypical ductal cells suspicious for well differentiated adenocarcinoma.  CT of the chest was obtained which showed multiple right greater than left bilateral solid subcentimeter pulmonary nodules measuring up to 8 mm concerning for pulmonary metastatic disease.      After oncologist's discussion with patient initial plan was to pursue further workup with PET scan to assess staging and then referral to surgical oncology at [facility] for surgical management opinion.  Because of the patient's comorbidities and poor functional status due to bilateral AKA it was felt that she would likely be a poor surgical candidate due to her medical comorbidities.  The discussion then shifted to palliative chemotherapy with Gemzar.  Side effects of chemotherapy were discussed with patient as well as her niece.  Niece had significant concerns about the patient's ability to tolerate chemotherapy given her poor functional status and comorbidities and was concerned about patient being immunocompromised.  Patient has chronic osteomyelitis with chronic stage IV sacral decubitus ulcer and niece was concerned about risk for infection if the patient were to undergo chemotherapy and become immunocompromise.  Ultimately today the patient and niece had a goals of care discussion with oncologist who reviewed imaging and management strategies and patient opted to return to her long-term care facility with hospice support.  During the course of her stay she did have a episode of A-fib with RVR and cardiology was consulted.  They recommended restarting DOAC once cleared by other consultants and continue her metoprolol tartrate 25 mg twice daily.  However  metoprolol was held given low blood pressures in the upper 90s to 100s systolic.  She remains on digoxin.    Liver enzymes continue to trend down as well as her bilirubin over the course of her stay.  AST and ALT completely normalized by 11/27/2024.  Infectious disease was consulted regarding chronic sacral decubitus ulcer with osteomyelitis.  ID recommended holding antibiotics.    Pain has been adequately controlled with tramadol.  Blood sugars have been somewhat elevated now that appetite has returned.  Her basal insulin was adjusted to 70 units.  At discharge she will be adjusted back down to 60 units basal with her usual sliding scale from nursing home.  She will resume her Jardiance that she was taking prior to admit to the hospital.  Labs on the day of discharge revealed potassium 3.4 for which she has been replaced x 2.  Liver function test AST and ALT.ALT Dave phosphatase is elevated to 41 but overall shows trend down.  CBC on the day of discharge shows a normal white cell count at 4.33.  Hemoglobin is 10.8 which has been stable over the course of the last 6 days.  Platelet count is normal at 149,000.  She will be discharged on her same medication regimen except for the addition of Zofran for any nausea she might experience.  Her QTc was noted to be normal during her stay here.  Hospice consult was placed today at patient and family's request.  Hospice did meet with the patient at bedside in the knee's via phone and provided an explanation of service.  Plan is to return to long-term care facility with hospice support.  Patient verbalized her desire for DNR.  Does not want to pursue chemotherapy with her goal being comfort and quality of life.  Hospice will follow-up [place] [facility].      Discharge 11/30/2024 was canceled due to elevated blood sugars following a carb load with snack cake late evening.  Blood sugars got up to 500 her insulin sliding scale was adjusted.  Blood sugars have now  trended down to more acceptable range at 259 this morning.  On discharge she will resume her long-term care sliding scale schedule and her Lantus 60 units.  She will resume back on her Jardiance.  Jardiance was held during her stay.  Today she is looking forward to going back to her long-term care facility where her niece works.  She understands that hospice will be following her there.  She again reiterated her desire for focus on quality of life and comfort moving forward and her CODE STATUS has been updated to DNR with comfort measures.    She is stable from a medical standpoint to discharge to long-term care this morning.  Transportation arrangements have been made for transport today around 1145 with oxygen.  She is on chronic oxygen at her long-term care facility and this will be resumed at discharge.      Day of Discharge     Subjective:    Some mild fatigue this morning.  Overall pain has been well-controlled.  She denies any new issues or new concerns.  She is looking forward to going back to her long-term care facility and seeing her niece and the other staff members who work there.  Denies any nausea, vomiting, chest pain, shortness of breath.    Physical Exam:  Temp:  [97.2 °F (36.2 °C)-98.4 °F (36.9 °C)] 97.6 °F (36.4 °C)  Heart Rate:  [63-85] 72  Resp:  [12-16] 12  BP: ()/(54-69) 91/55  Body mass index is 77.79 kg/m².      Physical Exam  Vitals and nursing note reviewed.   Constitutional:       General: She is not in acute distress.     Appearance: She is obese. She is not ill-appearing.  Lying in bed.  Appears comfortable.       HENT:      Head: Normocephalic.      Nose: Nose normal.      Mouth/Throat:      Mouth: Mucous membranes are moist.      Pharynx: Oropharynx is clear.   Eyes:      General: No scleral icterus.        Right eye: No discharge.         Left eye: No discharge.      Conjunctiva/sclera: Conjunctivae normal.   Cardiovascular:      Rate and Rhythm: Normal rate and regular rhythm.    Pulmonary:      Effort: Pulmonary effort is normal. No respiratory distress.      Breath sounds: Normal breath sounds. No wheezing or rales.      Comments: Poor inspiratory effort.  Overall clear.  Nasal cannula 2 L  Abdominal:      General: Bowel sounds are normal. There is no distension.      Palpations: Abdomen is soft.      Tenderness: There is no abdominal tenderness.   Musculoskeletal:      Cervical back: Neck supple.      Right lower leg: No edema.      Left lower leg: No edema.      Comments: S/p bilateral AKA   Skin:     General: Skin is warm and dry.      Capillary Refill: Capillary refill takes less than 2 seconds.      Coloration: Skin is not jaundiced.   Neurological:      General: No focal deficit present.      Mental Status: She is alert and oriented to person, place, and time. Mental status is at baseline.      Cranial Nerves: No cranial nerve deficit.   Psychiatric:         Behavior: Behavior normal.         Thought Content: Thought content normal.      Comments: Flat affect        Consultants     Consult Orders (all) (From admission, onward)       Start     Ordered    11/30/24 1447  Inpatient Case Management  Consult  Once        Provider:  (Not yet assigned)    11/30/24 1447    11/30/24 0919  Inpatient Hospice / [facility] Consult  Once        Specialty:  Hospice and Palliative Medicine  Provider:  (Not yet assigned)    11/30/24 0918    11/29/24 1301  Inpatient Palliative Care Team Consult  Once        Provider:  (Not yet assigned)    11/29/24 1301    11/27/24 1420  Inpatient General Surgery Consult For Implanted Port Placement  Once        Specialty:  General Surgery  Provider:  Shade Lacy MD    11/27/24 1419    11/27/24 0846  Hematology & Oncology Inpatient Consult  Once        Specialty:  Hematology and Oncology  Provider:  Fay Ramon MD    11/27/24 0845    11/23/24 1051  Inpatient Cardiology Consult  Once        Specialty:  Cardiology  Provider:  Humphrey Giordano,  MD    11/23/24 1051    11/21/24 1146  Inpatient Advance Care Planning Consult  Once        Provider:  (Not yet assigned)    11/21/24 1145    11/20/24 1800  Inpatient Infectious Diseases Consult  Once        Specialty:  Infectious Diseases  Provider:  Connor Mcginnis MD    11/20/24 1759    11/20/24 1215  Inpatient Gastroenterology Consult  Once        Specialty:  Gastroenterology  Provider:  Angel Castaneda MD    11/20/24 1214                  Procedures     ENDOSCOPIC RETROGRADE CHOLANGIOPANCREATOGRAPHY WITH SPHINCTEROTOMY, BRUSHINGS, AND STENT PLACEMENT    Imaging Results (All)       Procedure Component Value Units Date/Time    CT Chest With Contrast Diagnostic [[id number]] Collected: 11/27/24 1307     Updated: 11/27/24 1326    Narrative:      CT CHEST WITH IV CONTRAST     HISTORY: Recently diagnosed presumed pancreatic adenocarcinoma. Staging.     TECHNIQUE: Radiation dose reduction techniques were utilized, including  automated exposure control and exposure modulation based on body size.   3 mm images were obtained through the chest after the administration of  IV contrast.     COMPARISON: CT abdomen pelvis 11/22/2024        FINDINGS: Partially visualized internal biliary stent. Resolution of  prior biliary dilation with pneumobilia now present. Pancreatic head  mass best characterized on recent CT. Splenomegaly (15 cm). Heart is  normal in size. Severe coronary artery calcifications. Main pulmonary  artery at the upper limits of normal (29 mm). Nondilated thoracic aorta.  No mediastinal/hilar lymphadenopathy. Patulous esophagus.     Left hemidiaphragm elevation. Trace bilateral pleural effusions.  Multisegmental left greater than right bilateral lower lobe relaxation  atelectasis. Trachea and remaining main bronchi are patent.      Multiple right greater than left bilateral solid pulmonary nodules.     Reference nodules:  *  8 mm right upper lobe (series 3/image 47)  *  6 mm right upper lobe  (3/24)  *  Sub-6 mm lingular (3/40)  *  Sub-6 mm right lower lobe (3/47)  *  Sub-6 mm right lower lobe (3/48)  *  Sub-6 mm left upper lobe (3/18             Impression:      Multiple right greater than left bilateral solid  subcentimeter pulmonary nodules measuring up to 8 mm will need continued  follow-up as pulmonary metastatic disease cannot be excluded. Outside  prior imaging of the chest, if available, would be helpful to  demonstrate nodule stability.     This report was finalized on 11/27/2024 1:22 PM by Dr. Grady Sandoval M.D on Workstation: [id number]       FL ercp biliary duct only [[id number]] Collected: 11/24/24 1522     Updated: 11/25/24 2200    Narrative:      ERCP INTERPRETATION ONLY 11/24/2024     HISTORY: ERCP.     There is contrast in the intrahepatic, common hepatic, and common bile  ducts. There is moderate biliary dilatation. There is a segment of the  common duct which is incompletely distended and narrowed for a length of  10 mm to 15 mm. A nonexpandable biliary stent is seen coursing into the  common duct.        FLUOROSCOPY TIME:  2 minutes, 26 seconds  ,  4   images, 2631 dose area  product.     This report was finalized on 11/25/2024 9:57 PM by Dr. Samy Zavala M.D on Workstation: [id number]       CT Abdomen With & Without Contrast [[id number]] Collected: 11/22/24 2001     Updated: 11/22/24 2020    Narrative:      CT ABDOMEN W WO CONTRAST-     HISTORY: Biliary obstruction from pancreatic mass.     TECHNIQUE: Radiation dose reduction techniques were utilized, including  automated exposure control and exposure modulation based on body size.   3 mm images were obtained through the abdomen after the administration  of IV contrast.     COMPARISON: None available        FINDINGS: Chronic appearing L5, L3 and L1 compression fractures with  approximately 50%, 25% and 50% vertebral body height loss respectively.  Left hemidiaphragm elevation. Spleen and colonic splenic flexure  are  partially outside the exam field-of-view. A 1.4 cm right renal lesion  shows no enhancement suggesting a hemorrhagic/proteinaceous cyst. Few  subcentimeter hypodense renal lesions too small to characterize. A 2.9 x  2.9 cm hypoenhancing pancreatic head mass (series 6/image 43). Resulting  more distal main pancreatic duct dilation measuring up to 1.4 cm (series  6/image 38) and distal pancreas parenchymal atrophy. Mass causes  moderate intra and extrahepatic biliary duct dilation (series 6/images  23 and 38). Post cholecystectomy. Mass encases and severely narrows the  main portal vein at the portal venous confluence (series 8/image 50). No  vascular interface with the celiac artery or SMA. Noncalcified  atherosclerotic plaque in the proximal SMA. More distal SMA is patent.  No abdominal lymphadenopathy. No focal hepatic lesion. No abdominal  omental or peritoneal nodularity. Remaining solid organs and visualized  bowel are normal.          Impression:      1. A 2.9 cm hypoenhancing pancreatic head mass is concerning for  pancreatic adenocarcinoma. Recommend EUS/FNA. Mass encases and severely  narrows the main portal vein at the portal venous confluence. Mass  causes moderate intra and extrahepatic bili duct dilation.  2. No abdominal lymphadenopathy. No focal hepatic lesion. No abdominal  omental or peritoneal nodularity.     This report was finalized on 11/22/2024 8:17 PM by Dr. Grady Sandoval M.D on Workstation: [id number]       FL ercp biliary duct only [[id number]] Collected: 11/21/24 1854     Updated: 11/21/24 1905    Narrative:      ERCP INTERPRETATION ONLY 11/21/2024     HISTORY: Attempted ERCP.     FINDINGS: Single limited field-of-view image of the abdomen is  submitted. Endoscope is seen with a small focus of irregular increased  density probably some injected contrast. No contrast is seen within the  biliary system on this image.     Fluoroscopy time 4 minutes 9 seconds 1 image 4106 dose area  product                 This report was finalized on 11/21/2024 7:02 PM by Dr. Samy Zavala M.D on Workstation: [id number]               Results for orders placed during the hospital encounter of 01/17/22    Adult Transthoracic Echo Complete W/ Cont if Necessary Per Protocol    Interpretation Summary  · Estimated right ventricular systolic pressure from tricuspid regurgitation is normal (<35 mmHg). Calculated right ventricular systolic pressure from tricuspid regurgitation is 20 mmHg.  · Left ventricular wall thickness is consistent with mild concentric hypertrophy.  · Left ventricular ejection fraction appears to be 51 - 55%. Left ventricular systolic function is low normal.  · Left ventricular diastolic function was indeterminate.  · Technically difficult study due to patient positioning, limited acoustic windows    Pertinent Labs     Results from last 7 days   Lab Units 11/30/24  0453 11/29/24  0806 11/28/24 0614 11/27/24 0624   WBC 10*3/mm3 4.33 4.57 4.12 5.28   HEMOGLOBIN g/dL 10.8* 10.8* 10.7* 10.8*   PLATELETS 10*3/mm3 149 138* 155 140     Results from last 7 days   Lab Units 11/30/24  1622 11/30/24  0453 11/29/24  0806 11/28/24 0614 11/27/24 0624   SODIUM mmol/L  --  141 137 135* 132*   POTASSIUM mmol/L 4.9 3.4* 3.8 3.9 4.2   CHLORIDE mmol/L  --  105 105 104 103   CO2 mmol/L  --  26.4 26.0 26.0 22.0   BUN mg/dL  --  16 15 13 10   CREATININE mg/dL  --  0.67 0.69 0.70 0.71   GLUCOSE mg/dL  --  240* 260* 263* 338*   EGFR mL/min/1.73  --  91.8 91.2 90.9 89.4     Results from last 7 days   Lab Units 11/30/24  0453 11/29/24  0806 11/28/24 0614 11/27/24 0624   ALBUMIN g/dL 2.6* 2.7* 2.6* 2.5*   BILIRUBIN mg/dL 0.8 0.9 0.9 1.0   ALK PHOS U/L 241* 272* 301* 354*   AST (SGOT) U/L 15 15 17 14   ALT (SGPT) U/L 22 26 28 33     Results from last 7 days   Lab Units 11/30/24  0453 11/29/24  0806 11/28/24  0614 11/27/24  0624   CALCIUM mg/dL 8.6 8.8 8.7 8.3*   ALBUMIN g/dL 2.6* 2.7* 2.6* 2.5*   MAGNESIUM mg/dL  "1.9 2.1 2.1 2.4   PHOSPHORUS mg/dL 3.5 3.0 2.7 2.6       Results from last 7 days   Lab Units 11/24/24  1543   DIGOXIN LVL ng/mL 0.50*           Invalid input(s): \"LDLCALC\"          Test Results Pending at Discharge     Pending Results       Procedure [Order ID] Specimen - Date/Time    Basic Metabolic Panel [[id number]]     Specimen: Blood     Phosphorus [[id number]]     Specimen: Blood               Discharge Details        Discharge Medications        New Medications        Instructions Start Date   cetirizine 10 MG tablet  Commonly known as: zyrTEC   5 mg, Oral, Daily      ondansetron ODT 4 MG disintegrating tablet  Commonly known as: ZOFRAN-ODT   4 mg, Oral, Every 8 Hours PRN      sennosides-docusate 8.6-50 MG per tablet  Commonly known as: PERICOLACE   2 tablets, Oral, 2 Times Daily             Changes to Medications        Instructions Start Date   atorvastatin 10 MG tablet  Commonly known as: LIPITOR  What changed: Another medication with the same name was removed. Continue taking this medication, and follow the directions you see here.   Take  by mouth.      tiZANidine 2 MG tablet  Commonly known as: ZANAFLEX  What changed:   when to take this  reasons to take this   2 mg, Oral, Every 8 Hours PRN             Continue These Medications        Instructions Start Date   apixaban 5 MG tablet tablet  Commonly known as: ELIQUIS   5 mg, Oral, Every 12 Hours Scheduled      ascorbic acid 500 MG tablet  Commonly known as: VITAMIN C   500 mg, Daily      aspirin 81 MG EC tablet   81 mg, Daily      baclofen 10 MG tablet  Commonly known as: LIORESAL   10 mg, 2 Times Daily      buPROPion  MG 24 hr tablet  Commonly known as: WELLBUTRIN XL   150 mg, Daily      busPIRone 15 MG tablet  Commonly known as: BUSPAR   15 mg, 3 Times Daily      cilostazol 100 MG tablet  Commonly known as: PLETAL   100 mg, 2 Times Daily      colestipol 1 g tablet  Commonly known as: COLESTID   1 g, 2 Times Daily      digoxin 125 MCG " tablet  Commonly known as: LANOXIN   125 mcg, Daily Digoxin      Ferrous Fumarate 324 (106 Fe) MG tablet   324 mg, Daily      furosemide 20 MG tablet  Commonly known as: LASIX   20 mg      insulin glargine 100 UNIT/ML injection  Commonly known as: LANTUS, SEMGLEE   60 Units, Subcutaneous, Nightly      insulin lispro 100 UNIT/ML injection  Commonly known as: humaLOG   3 Times Daily Before Meals      Jardiance 10 MG tablet tablet  Generic drug: empagliflozin   10 mg, Daily      l-methylfolate calcium 7.5 MG tablet tablet  Commonly known as: DEPLIN   7.5 mg, Daily      levETIRAcetam 500 MG tablet  Commonly known as: KEPPRA   500 mg, 2 Times Daily      metoprolol tartrate 100 MG tablet  Commonly known as: LOPRESSOR   100 mg, 2 Times Daily      milnacipran 50 MG tablet tablet  Commonly known as: SAVELLA   50 mg, Every 12 Hours Scheduled      multivitamin with minerals tablet tablet   1 tablet, Daily      potassium chloride 10 MEQ CR tablet  Commonly known as: KLOR-CON M10   20 mEq, 2 Times Daily      prazosin 2 MG capsule  Commonly known as: MINIPRESS   2 mg, Nightly      QUEtiapine 100 MG tablet  Commonly known as: SEROquel   100 mg, Oral, Nightly      traMADol 50 MG tablet  Commonly known as: ULTRAM   50 mg, Every 8 Hours PRN      traZODone 50 MG tablet  Commonly known as: DESYREL   50 mg, Oral               No Known Allergies    Discharge Disposition:  Skilled Nursing Facility (DC - External)      Discharge Diet:  Diet Order   Procedures    Diet: Regular/House, Diabetic, Gastrointestinal; Consistent Carbohydrate; Fat-Restricted; Texture: Regular (IDDSI 7); Fluid Consistency: Thin (IDDSI 0)       Discharge Activity:   Activity Instructions       Gradually Increase Activity Until at Pre-Hospitalization Level      Gradually Increase Activity Until at Pre-Hospitalization Level              CODE STATUS:    Code Status and Medical Interventions: No CPR (Do Not Attempt to Resuscitate); Comfort Measures   Ordered at: 12/01/24  1035     Level Of Support Discussed With:    Patient     Code Status (Patient has no pulse and is not breathing):    No CPR (Do Not Attempt to Resuscitate)     Medical Interventions (Patient has pulse or is breathing):    Comfort Measures       Future Appointments   Date Time Provider Department Center   12/12/2024  2:40 PM LAB CHAIR 5 [facility]  [facility] [place]   12/12/2024  3:00 PM Clara Quiñonez APRN [facility]   12/19/2024  7:30 AM IN[facility] PORT CHAIR  [facility]   12/19/2024  8:00 AM Fay Ramon MD [facility]   12/19/2024  8:45 AM CHAIR 20 [facility] - [facility]  [facility]      Contact information for follow-up providers       Sasha Bui MD .    Specialty: Family Medicine  Contact information:  [address]  [facility] [id number] [phone]               [facility] Follow up.    Specialty: Hospice  Why: [facility] will follow up with you at your long term care facility  Contact information:  [address]  [facility] [address]  [phone]                     Contact information for after-discharge care       Destination       [facility] [place] .    Service: Nursing Home  Contact information:  [address] [place] [address] [phone]                                   Time Spent on Discharge:  Greater than 30 minutes      MASOUD Duckworth  [facility]  12/01/24  16:16 EST

## 2024-12-01 NOTE — PLAN OF CARE
Goal Outcome Evaluation:  Plan of Care Reviewed With: patient        Progress: no change  Outcome Evaluation: Patient is A&Ox4, VSS, 2L NC. Q2 hr tunrs completed. Fall and isolation precautions miantained. Pt teary and voiced fear of future. Zanaflex and Oxy given for pain. Resting well throughout night. No BM yet. POC ongoing. Possible d/c in AM. Pteducated on diet after elevated BG of 513, extra insulin given per order. SSI given as well. PIV CRISELDA.

## 2024-12-02 NOTE — PROGRESS NOTES
Case Management Discharge Note      Final Note: Discharged back to [facility]. Katya Tony RN    Provided Post Acute Provider List?: Yes  Post Acute Provider List: Nursing Home  Provided Post Acute Provider Quality & Resource List?: Yes  Post Acute Provider Quality and Resource List: Nursing Home  Delivered To: Patient  Method of Delivery: In person    Selected Continued Care - Discharged on 12/1/2024 Admission date: 11/20/2024 - Discharge disposition: Skilled Nursing Facility (DC - External)      Destination Coordination complete.      Service Provider Services Address Phone Fax Patient Preferred    [facility] [address] [place] [id number] [phone] --               Transportation Services  Ambulance: Other ([facility])    Final Discharge Disposition Code: 04 - intermediate care facility

## 2024-12-02 NOTE — PAYOR COMM NOTE
"Chantal Romeo (74 y.o. Female)    PLEASE SEE ATTACHED FOR DC NOTICE   ADMISSION FROM 11/20-12/1 REQUEST TOTAL DAYS APPROVED   REF#[id number]   THANK YOU  JUANA AGUIAR RN/ DEPT   [facility]  PH: [phone]  FAX:  [phone]     Date of Birth   1950    Social Security Number       Address   [address] [place] [address]    Home Phone   [phone]    MRN   [id number]       P[employer]ec[employer]alism   Unknown    Marital Status   Single                            Admission Date   11/20/24    Admission Type   Urgent    Admitting Provider   Jeramy Briggs MD    Attending Provider       Department, Room/Bed   [address], 85/1       Discharge Date   12/1/2024    Discharge Disposition   Skilled Nursing Facility (DC - External)    Discharge Destination                                 Attending Provider: (none)   Allergies: No Known Allergies    Isolation: None   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), 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)   Code Status: Prior    Ht: 104.1 cm (41\")   Wt: 84.4 kg (186 lb)    Admission Cmt: None   Principal Problem: Bile duct obstruction [K83.1]                   Active Insurance as of 11/20/2024       Primary Coverage       Payor Plan Insurance Group Employer/Plan Group    MEDICARE MEDICARE B ONLY        Payor Plan Address Payor Plan Phone Number Payor Plan Fax Number Effective Dates    [address] [address] [phone]  5/1/2015 - None Entered    [facility] [id number]         Subscriber Name Subscriber Birth Date Member ID       CHANTAL ROMEO 1950 [id number]               Secondary Coverage       Payor Plan Insurance Group Employer/Plan Group    [place]        Payor Plan Address Payor Plan Phone Number Payor Plan Fax Number Effective Dates    [address] [phone]  8/31/2021 - None Entered    [facility] [id number]         Subscriber Name Subscriber Birth Date Member ID       CHANTAL ROMEO 1950 [id number]          "            Emergency Contacts        (Rel.) Home Phone Work Phone Mobile Phone    Sarah Kang (Niece) (Other) [phone] -- --              Chantale: NP [id number]  Tax ID [id number]     History & Physical        Angel Castaneda MD at 11/21/24 1623          GI CONSULT  NOTE:    Referring Provider:    Sasha Bui MD Milesko, Steven, DO    Chief complaint: Bile duct obstruction    Subjective.       Pre op diagnosis  Pancreatic mass  Elevated bilirubin  Obstructive jaundice  Elevated liver enzymes      History of present illness:      Chantal Kumar is a 74 y.o. female who presents today for Procedure(s):  ENDOSCOPIC RETROGRADE CHOLANGIOPANCREATOGRAPHY for the indications listed below.     The updated Patient Profile was reviewed prior to the procedure, in conjunction with the Physical Exam, including medical conditions, surgical procedures, medications, allergies, family history and social history.     Pre-operatively, I reviewed the indication(s) for the procedure, the risks of the procedure [including but not limited to: A 7 to 10% risk of post ERCP pancreatitis, unexpected bleeding possibly requiring hospitalization and/or unplanned repeat procedures, perforation possibly requiring surgical treatment, missed lesions and complications of sedation/MAC (also explained by anesthesia staff), and even death].     I have evaluated the patient for risks associated with the planned anesthesia and the procedure to be performed and find the patient an acceptable candidate for IV sedation.    Multiple opportunities were provided for any questions or concerns, and all questions were answered satisfactorily before any anesthesia was administered. We will proceed with the planned procedure.    Past Medical History:  Past Medical History:   Diagnosis Date    Arthritis     Bipolar disorder, unspecified     Borderline personality disorder     CHF (congestive heart failure)     Chronic pain     COPD  (chronic obstructive pulmonary disease)     Depression     Diabetes mellitus     Dysphagia     Generalized anxiety disorder     GERD (gastroesophageal reflux disease)     Hyperlipidemia     Hypertension     Migraine     Nightmare disorder     Osteoporosis     Pressure ulcer of unspecified site, stage 4     Seizures     Stroke     Type 2 diabetes mellitus with diabetic neuropathy, unspecified     Unspecified atrial fibrillation        Past Surgical History:  Past Surgical History:   Procedure Laterality Date    ABOVE KNEE AMPUTATION Bilateral 1/25/2022    Procedure: Bilateral above the knee amputation;  Surgeon: Herber Rodriguez MD;  Location: [facility] MAIN OR;  Service: Vascular;  Laterality: Bilateral;       Social History:  Social History     Tobacco Use    Smoking status: Never     Passive exposure: Never    Smokeless tobacco: Never   Vaping Use    Vaping status: Never Used   Substance Use Topics    Alcohol use: Never    Drug use: Never       Family History:  History reviewed. No pertinent family history.    Medications:  Medications Prior to Admission   Medication Sig Dispense Refill Last Dose/Taking    apixaban (ELIQUIS) 5 MG tablet tablet Take 1 tablet by mouth Every 12 (Twelve) Hours. Indications: Atrial Fibrillation 60 tablet 0 11/19/2024 Morning    ascorbic acid (VITAMIN C) 500 MG tablet Take 1 tablet by mouth Daily.   11/19/2024 Morning    aspirin 81 MG EC tablet Take 1 tablet by mouth Daily.   11/19/2024 Morning    atorvastatin (LIPITOR) 10 MG tablet Take  by mouth.   11/19/2024 Morning    atorvastatin (LIPITOR) 10 MG tablet Take 1 tablet by mouth Daily.   11/19/2024 Morning    baclofen (LIORESAL) 10 MG tablet Take 1 tablet by mouth 2 (Two) Times a Day.   11/19/2024 Morning    buPROPion XL (WELLBUTRIN XL) 150 MG 24 hr tablet Take 1 tablet by mouth Daily.   11/19/2024 Morning    busPIRone (BUSPAR) 15 MG tablet Take 1 tablet by mouth 3 (Three) Times a Day.   11/19/2024 Noon    cilostazol (PLETAL) 100 MG tablet  Take 1 tablet by mouth 2 (Two) Times a Day.   11/19/2024 Morning    colestipol (COLESTID) 1 g tablet Take 1 tablet by mouth 2 (Two) Times a Day.   11/19/2024 Morning    digoxin (LANOXIN) 125 MCG tablet Take 1 tablet by mouth Daily.   11/19/2024 Morning    empagliflozin (Jardiance) 10 MG tablet tablet Take 1 tablet by mouth Daily.   11/19/2024 Morning    Ferrous Fumarate 324 (106 Fe) MG tablet Take 324 mg by mouth Daily.   11/19/2024 Morning    furosemide (LASIX) 20 MG tablet Take 1 tablet by mouth.   11/19/2024 Morning    insulin lispro (humaLOG) 100 UNIT/ML injection Inject  under the skin into the appropriate area as directed 3 (Three) Times a Day Before Meals. Sliding scale   11/19/2024 Noon    l-methylfolate calcium (DEPLIN) 7.5 MG tablet tablet Take 1 tablet by mouth Daily.   11/19/2024 Morning    levETIRAcetam (KEPPRA) 500 MG tablet Take 1 tablet by mouth 2 (Two) Times a Day.   11/19/2024 Morning    metoprolol tartrate (LOPRESSOR) 100 MG tablet Take 1 tablet by mouth 2 (Two) Times a Day.   11/19/2024 Morning    milnacipran (SAVELLA) 50 MG tablet tablet Take 1 tablet by mouth Every 12 (Twelve) Hours.   11/19/2024 Morning    multivitamin with minerals tablet tablet Take 1 tablet by mouth Daily.   11/19/2024 Morning    potassium chloride (KLOR-CON M10) 10 MEQ CR tablet Take 2 tablets by mouth 2 (Two) Times a Day.   11/19/2024 Evening    prazosin (MINIPRESS) 2 MG capsule Take 1 capsule by mouth Every Night.   11/18/2024 Evening    tiZANidine (ZANAFLEX) 2 MG tablet Take 1 tablet by mouth 2 (Two) Times a Day.   11/19/2024 Bedtime    traMADol (ULTRAM) 50 MG tablet Take 1 tablet by mouth Every 8 (Eight) Hours As Needed for Moderate Pain.   11/19/2024 Noon    insulin glargine (LANTUS, SEMGLEE) 100 UNIT/ML injection Inject 60 Units under the skin into the appropriate area as directed Every Night.   11/18/2024 Bedtime    QUEtiapine (SEROquel) 100 MG tablet Take 1 tablet by mouth Every Night.   11/18/2024 Bedtime     traZODone (DESYREL) 50 MG tablet Take 1 tablet by mouth.   11/18/2024 Bedtime       Scheduled Meds:aspirin, 81 mg, Oral, Daily  atorvastatin, 10 mg, Oral, Daily  baclofen, 10 mg, Oral, BID  buPROPion XL, 150 mg, Oral, Daily  busPIRone, 15 mg, Oral, TID  cholestyramine light, 1 packet, Oral, Daily  cilostazol, 100 mg, Oral, BID  digoxin, 125 mcg, Oral, Daily  empagliflozin, 10 mg, Oral, Daily  ferrous sulfate, 325 mg, Oral, Daily With Breakfast  insulin glargine, 20 Units, Subcutaneous, Nightly  insulin lispro, 2-7 Units, Subcutaneous, 4x Daily AC & at Bedtime  levETIRAcetam, 500 mg, Oral, BID  metoprolol tartrate, 50 mg, Oral, BID  milnacipran, 50 mg, Oral, Q12H  multivitamin with minerals, 1 tablet, Oral, Daily  prazosin, 2 mg, Oral, Nightly  QUEtiapine, 100 mg, Oral, Nightly  sodium chloride, 10 mL, Intravenous, Q12H  traZODone, 50 mg, Oral, Nightly      Continuous Infusions:sodium chloride, 75 mL/hr, Last Rate: 75 mL/hr (11/21/24 1521)  sodium chloride, 50 mL/hr      PRN Meds:.  dextrose    dextrose    glucagon (human recombinant)    sodium chloride    sodium chloride    sodium chloride    tiZANidine    traMADol    ALLERGIES:  Patient has no known allergies.    ROS:  The following systems were reviewed and negative;  Constitution:  No fevers, chills, no unintentional weight loss  Skin: no rash, no jaundice  Eyes:  No blurry vision, no eye pain  HENT:  No change in hearing or smell  Resp:  No dyspnea or cough  CV:  No chest pain or palpitations  :  No dysuria, hematuria  Musculoskeletal:  No leg cramps or arthralgias  Neuro:  No tremor, no numbness  Psych:  No depression or confsusion    Objective    Vital Signs:   Vitals:    11/21/24 0628 11/21/24 0802 11/21/24 1144 11/21/24 1555   BP: 112/61 95/55 96/55 98/59   BP Location: Left arm Right arm Right arm Right arm   Patient Position: Lying Lying Lying Lying   Pulse: 82 79 79 83   Resp: 16 16 16 18   Temp: 99 °F (37.2 °C) 97.9 °F (36.6 °C)     TempSrc: Oral Oral      SpO2: 90% 93% 91% 91%   Weight:           Physical Exam:       General Appearance:    Awake and alert, in no acute distress   Head:    Normocephalic, without obvious abnormality, atraumatic   Throat:   No oral lesions, no thrush, oral mucosa moist   Lungs:     respirations regular, even and unlabored   Skin:   No rash, no jaundice       Results Review:  Lab Results (last 24 hours)       Procedure Component Value Units Date/Time    POC Glucose Once [[id number]]  (Abnormal) Collected: 11/21/24 1142    Specimen: Blood Updated: 11/21/24 1143     Glucose 194 mg/dL     CANDIDA AURIS PCR - Swab, Axilla Right, Axilla Left and Groin [[id number]]  (Normal) Collected: 11/20/24 2251    Specimen: Swab from Axilla Right, Axilla Left and Groin Updated: 11/21/24 1037     CANDIDA AURIS PCR Not Detected    Cancer Antigen 19-9 [[id number]]  (Abnormal) Collected: 11/21/24 0733    Specimen: Blood Updated: 11/21/24 1013     CA 19-9 904.0 U/mL     Narrative:      Results may be falsely decreased if patient taking Biotin.    Testing Method: Roche [employer]ostics Electrochemiluminescence Immunoassay(ECLIA)  Values obtained with different assay methods or kits cannot be used interchangeably.    Protime-INR [[id number]]  (Abnormal) Collected: 11/21/24 0733    Specimen: Blood from Arm, Right Updated: 11/21/24 0805     Protime 16.1 Seconds      INR 1.27    POC Glucose Once [[id number]]  (Abnormal) Collected: 11/21/24 0800    Specimen: Blood Updated: 11/21/24 0801     Glucose 180 mg/dL     POC Glucose Once [[id number]]  (Abnormal) Collected: 11/20/24 2103    Specimen: Blood Updated: 11/20/24 2104     Glucose 232 mg/dL     Procalcitonin [[id number]]  (Normal) Collected: 11/20/24 1348    Specimen: Blood Updated: 11/20/24 1825     Procalcitonin 0.12 ng/mL     Narrative:      As a Marker for Sepsis (Non-Neonates):    1. <0.5 ng/mL represents a low risk of severe sepsis and/or septic shock.  2. >2 ng/mL represents a high risk of severe sepsis and/or septic  "shock.    As a Marker for Lower Respiratory Tract Infections that require antibiotic therapy:    PCT on Admission    Antibiotic Therapy       6-12 Hrs later    >0.5                Strongly Recommended  >0.25 - <0.5        Recommended   0.1 - 0.25          Discouraged              Remeasure/reassess PCT  <0.1                Strongly Discouraged     Remeasure/reassess PCT    As 28 day mortality risk marker: \"Change in Procalcitonin Result\" (>80% or <=80%) if Day 0 (or Day 1) and Day 4 values are available. Refer to http://www.[facility]-pct-calculator.com    Change in PCT <=80%  A decrease of PCT levels below or equal to 80% defines a positive change in PCT test result representing a higher risk for 28-day all-cause mortality of patients diagnosed with severe sepsis for septic shock.    Change in PCT >80%  A decrease of PCT levels of more than 80% defines a negative change in PCT result representing a lower risk for 28-day all-cause mortality of patients diagnosed with severe sepsis or septic shock.       C-reactive Protein [[id number]]  (Abnormal) Collected: 24 1348    Specimen: Blood Updated: 24 1818     C-Reactive Protein 6.57 mg/dL             Imaging Results (Last 24 Hours)       ** No results found for the last 24 hours. **             I reviewed the patient's labs and imaging.    ASSESSMENT AND PLAN:      Principal Problem:    Bile duct obstruction       Procedure(s):  ENDOSCOPIC RETROGRADE CHOLANGIOPANCREATOGRAPHY      I discussed the patient's findings and my recommendations with the patient.    Angel Castaneda MD  24  16:23 EST                  Electronically signed by Agnel Castaneda MD at 24 1627       Jeramy Briggs MD at 24 1216          [facility]   HISTORY AND PHYSICAL    Patient Name: Chantal Kumar  : 1950  MRN: [id number]  Primary Care Physician:  Sasha Bui MD  Date of admission: 2024    Subjective  Subjective     Chief Complaint: "     History of Present Illness    74-year-old female history of diabetes who has some abdominal discomfort.  She was evaluated in the emergency room for this.  CT scan showed dilated intra and extrahepatic biliary tree and a distal common bile duct obstruction from pancreatic mass.  Patient denied abdominal pain to the gastroenterologist but is having some abdominal pain when I am seeing her.  Patient has had some diarrhea.  She states she has had significant weight loss.      Review of Systems     Personal History     Past Medical History:   Diagnosis Date    Arthritis     CHF (congestive heart failure)     Depression     Diabetes mellitus     Hyperlipidemia     Hypertension     Migraine     Osteoporosis     Seizures     Stroke        Past Surgical History:   Procedure Laterality Date    ABOVE KNEE AMPUTATION Bilateral 1/25/2022    Procedure: Bilateral above the knee amputation;  Surgeon: Herber Rodriguez MD;  Location: [facility] MAIN OR;  Service: Vascular;  Laterality: Bilateral;       Family History: family history is not on file. Otherwise pertinent FHx was reviewed and not pertinent to current issue.    Social History:  reports that she has never smoked. She has never used smokeless tobacco. She reports that she does not drink alcohol and does not use drugs.    Home Medications:  Ferrous Fumarate, QUEtiapine, apixaban, aspirin, atorvastatin, baclofen, buPROPion XL, busPIRone, cilostazol, colestipol, digoxin, fentaNYL, furosemide, glimepiride, insulin lispro, l-methylfolate calcium, levETIRAcetam, metoprolol tartrate, milnacipran, multivitamin with minerals, omeprazole, tiZANidine, traMADol, and traZODone    Allergies:  No Known Allergies    Objective   Objective     Vitals:   Temp:  [97.3 °F (36.3 °C)] 97.3 °F (36.3 °C)  Heart Rate:  [84] 84  Resp:  [18] 18  BP: (124)/(71) 124/71    Physical Exam  Constitutional:       General: She is not in acute distress.  Pulmonary:      Effort: Pulmonary effort is normal.       Breath sounds: Normal breath sounds.   Abdominal:      General: There is no distension.      Palpations: Abdomen is soft.      Tenderness: There is no abdominal tenderness.   Musculoskeletal:      Comments: Bilateral above-the-knee amputations noted   Skin:     General: Skin is warm and dry.      Comments: Wound with no evidence of infection   Neurological:      General: No focal deficit present.      Mental Status: She is alert.   Psychiatric:         Mood and Affect: Mood normal.         Behavior: Behavior normal.         Result Review   Result Review:  I have personally reviewed the results from the time of this admission to 11/20/2024 12:16 EST and agree with these findings:  []  Laboratory list / accordion  [x]  Microbiology  [x]  Radiology  []  EKG/Telemetry   [x]  Cardiology/Vascular   []  Pathology  [x]  Old records  []  Other:  Most notable findings include: elevated bilirubin      Assessment & Plan  Assessment / Plan     Brief Patient Summary:  Chantal Kumar is a 74 y.o. female who elevated bilirubin    Active Hospital Problems:  Active Hospital Problems    Diagnosis     **Bile duct obstruction     Persistent atrial fibrillation     S/P AKA (above knee amputation) bilateral     Bipolar disorder, unspecified     Secondary hypertension     Type 2 diabetes mellitus with diabetic neuropathy, unspecified      Plan:   Bile duct stone with jaundice and elevated bilirubin 3.8  -ERCP in the a.m.  -Gastroenterology  -NPO after midnight    Concern for osteomyelitis around decubitus ulcer  -Check procalcitonin level here, CRP  -Hold on antibiotics  -Pictures of wound in chart, the wound does not appear infected by exam  -infectious disease consult    Atrial fibrillation for which she is on Eliquis  -We will hold on Eliquis for now    History of pressure ulcer and status post bilateral above-the-knee amputation  -Likely from severe peripheral vascular disease for which she is on aspirin and Pletal    Seizure  history  -Continue with Kera              VTE Prophylaxis:  Mechanical VTE prophylaxis orders are present.        CODE STATUS:       Admission Status:  I believe this patient meets  status.    Jeramy Briggs MD    Electronically signed by Jeramy Briggs MD at 11/24/24 1809       Medication Administration Report for Chantal Kumar as of 11/27/24 through 12/1/24    Given  Hold  Not Given  Due  Canceled Entry  Other Actions  Time  Time  (Time)  Time  Time-Action    Discontinued  Completed  Future  MAR Hold  Linked    Medications  11/27/24 11/28/24 11/29/24 11/30/24 12/01/24    Completed Medications    diatrizoate meglumine-sodium (GASTROGRAFIN) 66-10 % oral solution 30 mL  Dose: 30 mL  Freq: Once Route: PO  Indications of Use: RADIOGRAPHY  Start: 11/22/24 1800 End: 11/22/24 1717    For GI indication: Nurse to Administer undiluted  For radiology indication: Administer per local procedures per radiology exam  insulin lispro (HUMALOG/ADMELOG) injection 5 Units  Dose: 5 Units  Freq: Once Route: SC  Start: 11/30/24 2000 End: 11/30/24 2035    Admin Instructions:   Solution should be clear. If cloudy, contact pharmacy for a new vial. Scheduled mealtime doses of this medication should be held if patient NPO.   Caution: Look alike/sound alike drug alert  iopamidol (ISOVUE-300) 61 % injection 100 mL  Dose: 100 mL  Freq: Once in Imaging Route: IV  Start: 11/27/24 1345 End: 11/27/24 1300  1300-Given  iopamidol (ISOVUE-300) 61 % injection 100 mL  Dose: 100 mL  Freq: Once in Imaging Route: IV  Start: 11/22/24 2030 End: 11/22/24 1943    metoclopramide (REGLAN) injection 10 mg  Dose: 10 mg  Freq: Once Route: IV  Start: 11/24/24 0500 End: 11/24/24 0500    Doses of 10 mg or less can be given IV push undiluted over 1 to 2 minutes    potassium & sodium phosphates (PHOS-NAK) 280-160-250 MG packet 2 packet  Dose: 2 packet  Freq: Once Route: PO  Start: 11/26/24 1100 End: 11/26/24 1221  Admin Instructions:     Physician  Progress Notes (last 5 days)        Mony Bolanos APRN at 24 1914          Brief note:     Pt's blood sugar up 513 from 464 after 9 units sliding scale.  Pt reported to RN that she was given a bundt cake by Hospice  earlier today which she ate and has been drinking coke rather than diet coke this afternoon.  Will give another 5 units SSI now and recheck in 1 hour.  RN discussed with pt and staff need for diet soda and limit/ avoid sugary foods given her blood sugar elevation.  She is on a consistent carbohydrate diet diabetic features currently- V. MASOUD Bolanos    Electronically signed by Mony Bolanos APRN at 24 1923       Mony Bolanos APRN at 24 1332              Name: Chantal Kumar ADMIT: 2024   : 1950  PCP: Sasha Bui MD    MRN: [id number] LOS: 10 days   AGE/SEX: 74 y.o. female  ROOM: [facility]     Interval History:   Oncology had Goals of care discussion with pt and niece.  After discussion with oncologist pt has opted to focus on quality of life and go home with hospice or to a facility with hospice.      Subjective   Subjective   Appetite fair.  Reports increased right sided Upper quad pain at times.  RN reports she is getting good rest following tramadol dosing.  Denies n/v, chest pain/ pressure, shoa.  Last BM she reports was last Saturday. Which she says is typical for her.        Objective   Objective   Vital Signs  Temp:  [97.3 °F (36.3 °C)-97.6 °F (36.4 °C)] 97.4 °F (36.3 °C)  Heart Rate:  [66-88] 88  Resp:  [16] 16  BP: ()/(56-67) 96/67  SpO2:  [94 %-97 %] 94 %  on  Flow (L/min) (Oxygen Therapy):  [2] 2;   Device (Oxygen Therapy): nasal cannula  Body mass index is 77.79 kg/m².      Physical Exam  Vitals and nursing note reviewed.   Constitutional:       General: She is not in acute distress.     Appearance: She is obese. She is not ill-appearing.      Comments: Sitting up in bed eating lunch.    HENT:      Head:  Normocephalic.      Nose: Nose normal.      Mouth/Throat:      Mouth: Mucous membranes are moist.      Pharynx: Oropharynx is clear.   Eyes:      General: No scleral icterus.        Right eye: No discharge.         Left eye: No discharge.      Conjunctiva/sclera: Conjunctivae normal.   Cardiovascular:      Rate and Rhythm: Normal rate and regular rhythm.   Pulmonary:      Effort: Pulmonary effort is normal. No respiratory distress.      Breath sounds: Normal breath sounds. No wheezing or rales.      Comments: Poor inspiratory effort.   Abdominal:      General: Bowel sounds are normal. There is no distension.      Palpations: Abdomen is soft.      Tenderness: There is no abdominal tenderness.   Musculoskeletal:      Cervical back: Neck supple.      Right lower leg: No edema.      Left lower leg: No edema.      Comments: S/p bilateral AKA   Skin:     General: Skin is warm and dry.      Capillary Refill: Capillary refill takes less than 2 seconds.      Coloration: Skin is not jaundiced.   Neurological:      General: No focal deficit present.      Mental Status: She is alert and oriented to person, place, and time. Mental status is at baseline.      Cranial Nerves: No cranial nerve deficit.   Psychiatric:         Behavior: Behavior normal.         Thought Content: Thought content normal.      Comments: Flat affect       Results Review     I reviewed the patient's new clinical results.  Results from last 7 days   Lab Units 11/30/24  0453 11/29/24  0806 11/28/24  0614 11/27/24  0624   WBC 10*3/mm3 4.33 4.57 4.12 5.28   HEMOGLOBIN g/dL 10.8* 10.8* 10.7* 10.8*   PLATELETS 10*3/mm3 149 138* 155 140     Results from last 7 days   Lab Units 11/30/24  0453 11/29/24  0806 11/28/24  0614 11/27/24  0624   SODIUM mmol/L 141 137 135* 132*   POTASSIUM mmol/L 3.4* 3.8 3.9 4.2   CHLORIDE mmol/L 105 105 104 103   CO2 mmol/L 26.4 26.0 26.0 22.0   BUN mg/dL 16 15 13 10   CREATININE mg/dL 0.67 0.69 0.70 0.71   GLUCOSE mg/dL 240* 260* 263*  338*   EGFR mL/min/1.73 91.8 91.2 90.9 89.4     Results from last 7 days   Lab Units 11/30/24  0453 11/29/24  0806 11/28/24  0614 11/27/24  0624   ALBUMIN g/dL 2.6* 2.7* 2.6* 2.5*   BILIRUBIN mg/dL 0.8 0.9 0.9 1.0   ALK PHOS U/L 241* 272* 301* 354*   AST (SGOT) U/L 15 15 17 14   ALT (SGPT) U/L 22 26 28 33     Results from last 7 days   Lab Units 11/30/24  0453 11/29/24  0806 11/28/24  0614 11/27/24  0624   CALCIUM mg/dL 8.6 8.8 8.7 8.3*   ALBUMIN g/dL 2.6* 2.7* 2.6* 2.5*   MAGNESIUM mg/dL 1.9 2.1 2.1 2.4   PHOSPHORUS mg/dL 3.5 3.0 2.7 2.6           Glucose   Date/Time Value Ref Range Status   11/30/2024 1222 322 (H) 70 - 130 mg/dL Final   11/30/2024 0558 236 (H) 70 - 130 mg/dL Final   11/29/2024 2127 277 (H) 70 - 130 mg/dL Final   11/29/2024 1634 223 (H) 70 - 130 mg/dL Final   11/29/2024 1140 256 (H) 70 - 130 mg/dL Final   11/29/2024 0616 281 (H) 70 - 130 mg/dL Final   11/28/2024 2205 364 (H) 70 - 130 mg/dL Final       No radiology results for the last day    I have personally reviewed all medications:  Scheduled Medications  [Held by provider] apixaban, 5 mg, Oral, Q12H  aspirin, 81 mg, Oral, Daily  atorvastatin, 10 mg, Oral, Daily  baclofen, 10 mg, Oral, BID  buPROPion XL, 150 mg, Oral, Daily  busPIRone, 15 mg, Oral, TID  cetirizine, 10 mg, Oral, Daily  cholestyramine light, 1 packet, Oral, Daily  cilostazol, 100 mg, Oral, BID  digoxin, 125 mcg, Oral, Daily  ferrous sulfate, 325 mg, Oral, Daily With Breakfast  insulin glargine, 70 Units, Subcutaneous, Nightly  insulin lispro, 2-7 Units, Subcutaneous, 4x Daily AC & at Bedtime  levETIRAcetam, 500 mg, Oral, BID  milnacipran, 50 mg, Oral, Q12H  multivitamin with minerals, 1 tablet, Oral, Daily  polyethylene glycol, 17 g, Oral, Daily  prazosin, 1 mg, Oral, Nightly  QUEtiapine, 50 mg, Oral, Nightly  senna-docusate sodium, 2 tablet, Oral, BID  sodium chloride, 10 mL, Intravenous, Q12H  traZODone, 50 mg, Oral, Nightly    Infusions   Diet  Diet: Regular/House, Diabetic,  Gastrointestinal; Consistent Carbohydrate; Fat-Restricted; Texture: Regular (IDDSI 7); Fluid Consistency: Thin (IDDSI 0)    I have personally reviewed:  [x]  Laboratory   []  Microbiology   []  Radiology   []  EKG/Telemetry  []  Cardiology/Vascular   []  Pathology    [x]  Records    Estimated Creatinine Clearance: 55.7 mL/min (by C-G formula based on SCr of 0.67 mg/dL).      Assessment/Plan     Active Hospital Problems    Diagnosis  POA    **Bile duct obstruction [K83.1]  Yes    Pancreatic adenocarcinoma [C25.9]  Yes    Malignant neoplasm of other parts of pancreas [C25.7]  Unknown    Persistent atrial fibrillation [I48.19]  Yes    S/P AKA (above knee amputation) bilateral [Z89.611, Z89.612]  Not Applicable    Chronic diastolic CHF (congestive heart failure) [I50.32]  Yes    History of CVA (cerebrovascular accident) [Z86.73]  Not Applicable    Bipolar disorder, unspecified [F31.9]  Yes    Secondary hypertension [I15.9]  Yes    Type 2 diabetes mellitus with diabetic neuropathy, unspecified [E11.40]  Yes      Resolved Hospital Problems   No resolved problems to display.       74 y.o. female admitted with biliary obstruction.    Biliary obstruction due to stricture  Pancreatic adenoCA  -ERCP unsuccessful on 11/21, second attempt on 11/24 was successful and stent placed  - elevated at 904  -path showed adenoCA  - Oncologist discussed imaging, diagnosis, and management with pt and niece.  Pt has opted to not pursue palliative chemotherapy but rather go home / facility with hospice support.   - Oncologist has placed hospice consult.   -Gen Surg placed port 11/29  -tolerating regular diet  -LFTs, TBili, and AlkPhos all improved  - will change norco to roxicodone for breakthrough pain given prior issues with elevated LFT's     Chronic osteomyelitis associated with chronic stage 4 sacral decubitus ulcer  -PCT wnl, CRP elevated on admit 11/20  -pictures of wound in chart  -ID consulted and recommend no antibiotics, f/u  "outpatient with wound care  -She was seen by wound care here in the hospital and they recommend: \"Cleanse with NS - using q tip placed a piece of opticel into the wound space - apply a 4x4 or 6x6 optifoam by folding and adequately placing in the gluteal cleft assuring the foam portion of the dressing makes contact with skin; change every other day\"  - Add Glucerna strawberry BID to trays to help with nutrition intake.       Parox Atrial fibrillation  Chronic AC (Eliquis)  - 12 lead EKG 11/23 SR.   -metoprolol on hold for now. BPs are on the low side 's systolic.   -continue digoxin  -HRs well controlled.    -patient has been seen by Cardiology in the hospital  -continue aspirin  -GI okay with restarting Eliquis 11/25.    - Port was not placed as planned yesterday while awaiting goals of care discussion.  Will resume eliquis was on hold for possible port placement. Last dose 11/28.      PAD  Bilateral AKA  - continue aspirin and Pletal  - Plt drop 11/29 to 138,000 but normalized 11/30.      Seizure history  -continue Keppra  -no seizure activity here     Type 2 DM  - sugars high as po intake increasing  -11/29 Lantus increased to 70 units  - continue SSI  - A1c 8.2  - was on jardiance prior to admit.     Chronic hypoxic resp failure  -stable on 2L/min by NC    Hypophosphatemia/ Hypokalemia  -replaced with protocol  - recheck BMP and phos in am.     Itching ears  -trial of Zyrtec    Chronic constipation  - continue sennakot and miralax.  - add lactulose BID until BM achieved.           Eliquis should suffice for DVT prophylaxis.  Full code.  Discussed with patient.  Anticipate discharge back to SNU facility on Saturday.  Expected Discharge Date: 11/30/2024; Expected Discharge Time:       MASOUD Duckworth  [facility]  11/30/24  13:32 EST      Electronically signed by Mony Bolanos APRN at 11/30/24 8981       Fay Ramon MD at 11/30/24 0608          [facility] " INPATIENT PROGRESS NOTE    Length of Stay:  10 days    CHIEF COMPLAINT/REASON FOR VISIT:  Pancreatic adenocarcinoma  Pulmonary nodules    SUBJECTIVE:   11/30/2024:   Patient was eating breakfast at the time of my visit today.  She reports feeling okay overall.  I had a long discussion with the patient and her niece who was over the phone, details under assessment and plan.    ROS:  All systems reviewed and found to be negative except for that noted above    OBJECTIVE:  Vitals:    11/29/24 1233 11/29/24 1731 11/29/24 2122 11/30/24 0540   BP:  99/57 114/58 104/56   BP Location:  Left arm Left arm Left arm   Patient Position:  Lying Lying Lying   Pulse: 71 66 87 84   Resp:  16 16 16   Temp:  97.3 °F (36.3 °C) 97.4 °F (36.3 °C) 97.6 °F (36.4 °C)   TempSrc:  Oral Oral Oral   SpO2:  97% 94% 95%   Weight:       Height:             PHYSICAL EXAMINATION:  General: Obese female in no acute distress.  AOx3  Chest/Lungs: CTABL.  No Rales rhonchi wheezing  Heart: S1-S2 normal.  No murmurs  Abdomen/GI: Soft, bowel sounds present  Extremities: Status post bilateral AKA    I have reexamined the patient and the results are consistent with the previously documented exam. Fay Raomn MD     DIAGNOSTIC DATA:  Results Review:     I reviewed the patient's new clinical results.    Results from last 7 days   Lab Units 11/30/24  0453 11/29/24  0806 11/28/24  0614   WBC 10*3/mm3 4.33 4.57 4.12   HEMOGLOBIN g/dL 10.8* 10.8* 10.7*   HEMATOCRIT % 34.8 34.6 33.3*   PLATELETS 10*3/mm3 149 138* 155      Results from last 7 days   Lab Units 11/30/24  0453 11/29/24  0806 11/28/24  0614   SODIUM mmol/L 141 137 135*   POTASSIUM mmol/L 3.4* 3.8 3.9   CHLORIDE mmol/L 105 105 104   CO2 mmol/L 26.4 26.0 26.0   BUN mg/dL 16 15 13   CREATININE mg/dL 0.67 0.69 0.70   CALCIUM mg/dL 8.6 8.8 8.7   BILIRUBIN mg/dL 0.8 0.9 0.9   ALK PHOS U/L 241* 272* 301*   ALT (SGPT) U/L 22 26 28   AST (SGOT) U/L 15 15 17   GLUCOSE mg/dL 240* 260* 263*      Lab Results    Component Value Date    NEUTROABS 3.57 11/26/2024     Results from last 7 days   Lab Units 11/23/24  0626   INR  1.27*     Results from last 7 days   Lab Units 11/30/24  0453   MAGNESIUM mg/dL 1.9       Assessment & Plan   ASSESSMENT/PLAN:  This is a 74 y.o. female with:     *Adenocarcinoma, likely pancreatic   *Multiple bilateral pulmonary nodules, subcentimeter  *Poor performance status, ECOG 3-most likely secondary to bilateral AKA and other medical comorbidities  Presented with abdominal discomfort, found to have total bilirubin elevated at 5.2  11/21/2022 ERCP with unsuccessful cannulization.  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  11/21/2024 Ca 19-9 - 904   11/22/2024-CT abdomen-a 2.9 cm hypoenhancing pancreatic head mass concerning for pancreatic adenocarcinoma.  Mass encases and severely narrows main portal vein at the portal venous confluence.  Mass causes moderate intra and extrahepatic biliary ductal dilatation.  No abdominal lymphadenopathy.  No abdominal omental or peritoneal nodularity.   11/24/2024 ERCP with sphincterotomy, brushing and stent placement by Dr. Malave.  IR consult was then canceled.    11/24/2024 brushings of the CBD revealed atypical ductal cells Suspicious for well-differentiated adenocarcinoma  11/27/2024 CT chest-multiple right greater than left bilateral solid subcentimeter pulmonary nodules measuring up to 8 mm.  Pulmonary metastatic disease cannot be excluded.  11/27/2024-I have reviewed the imaging, pathology and discussed the diagnosis and management with the patient.  She has a 2.9 cm hypoenhancing pancreatic head mass, elevated CA 19-9, and CBD brushings suspicious for well-differentiated adenocarcinoma.  She likely has pancreatic adenocarcinoma.  I have discussed next steps in management-obtaining a PET scan as an outpatient to complete staging; and referral to [facility] at [facility] for surgical management opinion.  Unfortunately, patient is a  nursing home resident.  She does have poor performance status mainly due to her medical comorbidities and bilateral AKA.  She seems to be a poor surgical candidate due to her medical comorbidities.  Would likely consider palliative chemo with Gemzar dose reduced to 800 mg/m² day 1 and day 15 of a 28-day cycle for better tolerance.  Reviewed possible side effects of chemotherapy including but not limited to cytopenia, febrile neutropenia, nausea, vomiting, hair loss.  11/28/2024: Patient is planned for port placement on Friday.  We will need patient's height prior to proceeding with chemotherapy plan.  I have reached out to bedside RN to obtain her height  11/29/2024: She is planned for port placement today.  We will plan to start Gemzar at 800 mg/m² as outpatient on day 1 and day 15 of a 28-day cycle.  She will be given appointments for chemo education.  11/30/2024: Long discussion had with patient and niece (over phone) -I reviewed patient's imaging, diagnosis, management.  Discussed palliative intent single agent chemotherapy with Gemzar.  Rereviewed possible side effects including but not limited to cytopenia, febrile neutropenia, immunosuppression, nausea, vomiting, hair loss.  Further discussed that the patient's niece's concerns regarding possible infection of her stage IV decub ulcer while on chemotherapy are quite valid.  Patient understands that chemotherapy may hamper her quality of life especially with her poor performance status and other medical comorbidities.  As such patient and niece together chose to focus on quality of life, and go home/to facility with hospice.  They were very appreciative of the care    *Elevated T bilirubin, resolved with CBD stent placement     *Anemia of chronic disease  Upon lab review she has had anemia since at least January 2022 with hemoglobin around 10.  11/27/2024 hemoglobin 10.8.  Lab evaluation consistent with anemia of chronic disease  11/30/2024: Hb stable at 10.8      *Chronic osteomyelitis with chronic stage IV sacral decub ulcer  No antibiotics recommended per infectious disease.     *Other medical comorbidities-diabetes type 2, hypertension, hyperlipidemia, bilateral above-knee amputations, A-fib on Eliquis     RECOMMENDATIONS/PLAN:   Had a long discussion with the patient and niece today.  Details as mentioned above  Patient has decided to go home/to facility with hospice  Hospice consultplaced  Nothing else to add from hematology standpoint.  We will sign off.    Fay Ramon MD      Electronically signed by Fay Ramon MD at 11/30/24 2677       Fay Ramon MD at 11/29/24 7461          I called and spoke with patient's niece who is also her power of .  The niece works at the nursing home facility from where the patient had come.  I had a long discussion with the niece regarding patient's recent diagnosis of adenocarcinoma, likely pancreatic in origin.  I reviewed the results of imaging which showed bilateral subcentimeter pulmonary nodules.  Discussed the nodules are subcentimeter, and as such too small for biopsy and are PET avidity.  as such, via unable to assess whether these are metastatic especially in the absence of previous imaging.  Further discussed with patient's multiple medical comorbidities and poor performance status, she is likely not a surgical candidate.  I also discussed patient's poor candidacy for chemotherapy given her poor performance status and comorbidities.  Expressed understanding and was in agreement.Efe  expressed her concerns as well for patient's tolerance of chemotherapy that she stated patient is noncompliant; and has the stage IV sacral decub for at least 4 years or more.  The niece was concerned that chemotherapy might make the patient immunosuppressed, and as such increase her risk for infection of the sacral decub and eventually make the patient septic.  These are all valid concerns.  Mutually we agreed to readdress  this tomorrow when I see the patient at bedside.  I will be calling the niece on speaker phone so that patient as well as niece are on the same page.  The niece further expressed that she does not have the power, and will never will overrule patient's wishes.    We discussed on holding off on port given pending final decision regarding chemotherapy.    Lastly, the nekrystin informed me that the patient is a DNR, and has always been at the outside facility.  She reports she has all the documents that she can fax if need be to support this.  I have conveyed it to the bedside RN via secure chat.        Electronically signed by Fay Ramon MD at 11/29/24 1422       Fay Ramon MD at 11/29/24 0908          [facility] INPATIENT PROGRESS NOTE    Length of Stay:  9 days    CHIEF COMPLAINT/REASON FOR VISIT:  Pancreatic adenocarcinoma  Pulmonary nodules    SUBJECTIVE:   11/29/2024:   No acute overnight events.  Patient denies any concerns or complaints today    ROS:  All systems reviewed and found to be negative except for that noted above    OBJECTIVE:  Vitals:    11/28/24 1730 11/28/24 2009 11/28/24 2100 11/29/24 0502   BP: 108/69 106/66 116/68 107/62   BP Location: Right arm  Left arm Left arm   Patient Position:   Lying Lying   Pulse: 90 87 93 91   Resp: 16  16 16   Temp: 97.3 °F (36.3 °C)  97.1 °F (36.2 °C) 98 °F (36.7 °C)   TempSrc: Oral  Oral Oral   SpO2: 92%  92% 92%   Weight:       Height:             PHYSICAL EXAMINATION:  General: Obese female in no acute distress.  AOx3  Chest/Lungs: CTABL.  No Rales rhonchi wheezing  Heart: S1-S2 normal.  No murmurs  Abdomen/GI: Soft, bowel sounds present  Extremities: Status post bilateral AKA    DIAGNOSTIC DATA:  Results Review:     I reviewed the patient's new clinical results.    Results from last 7 days   Lab Units 11/29/24  0806 11/28/24  0614 11/27/24  0624   WBC 10*3/mm3 4.57 4.12 5.28   HEMOGLOBIN g/dL 10.8* 10.7* 10.8*   HEMATOCRIT % 34.6 33.3* 34.0    PLATELETS 10*3/mm3 138* 155 140      Results from last 7 days   Lab Units 11/29/24  0806 11/28/24  0614 11/27/24  0624   SODIUM mmol/L 137 135* 132*   POTASSIUM mmol/L 3.8 3.9 4.2   CHLORIDE mmol/L 105 104 103   CO2 mmol/L 26.0 26.0 22.0   BUN mg/dL 15 13 10   CREATININE mg/dL 0.69 0.70 0.71   CALCIUM mg/dL 8.8 8.7 8.3*   BILIRUBIN mg/dL 0.9 0.9 1.0   ALK PHOS U/L 272* 301* 354*   ALT (SGPT) U/L 26 28 33   AST (SGOT) U/L 15 17 14   GLUCOSE mg/dL 260* 263* 338*      Lab Results   Component Value Date    NEUTROABS 3.57 11/26/2024     Results from last 7 days   Lab Units 11/23/24  0626   INR  1.27*     Results from last 7 days   Lab Units 11/29/24  0806   MAGNESIUM mg/dL 2.1       Assessment & Plan   ASSESSMENT/PLAN:  This is a 74 y.o. female with:     *Adenocarcinoma, likely pancreatic   *Multiple bilateral pulmonary nodules, subcentimeter  *Poor performance status, ECOG 3-most likely secondary to bilateral AKA and other medical comorbidities  Presented with abdominal discomfort, found to have total bilirubin elevated at 5.2  11/21/2022 ERCP with unsuccessful cannulization.  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  11/21/2024 Ca 19-9 - 904   11/22/2024-CT abdomen-a 2.9 cm hypoenhancing pancreatic head mass concerning for pancreatic adenocarcinoma.  Mass encases and severely narrows main portal vein at the portal venous confluence.  Mass causes moderate intra and extrahepatic biliary ductal dilatation.  No abdominal lymphadenopathy.  No abdominal omental or peritoneal nodularity.   11/24/2024 ERCP with sphincterotomy, brushing and stent placement by Dr. Malave.  IR consult was then canceled.    11/24/2024 brushings of the CBD revealed atypical ductal cells Suspicious for well-differentiated adenocarcinoma  11/27/2024 CT chest-multiple right greater than left bilateral solid subcentimeter pulmonary nodules measuring up to 8 mm.  Pulmonary metastatic disease cannot be excluded.  11/27/2024-I have  reviewed the imaging, pathology and discussed the diagnosis and management with the patient.  She has a 2.9 cm hypoenhancing pancreatic head mass, elevated CA 19-9, and CBD brushings suspicious for well-differentiated adenocarcinoma.  She likely has pancreatic adenocarcinoma.  I have discussed next steps in management-obtaining a PET scan as an outpatient to complete staging; and referral to surgical oncology at [facility] for surgical management opinion.  Unfortunately, patient is a nursing home resident.  She does have poor performance status mainly due to her medical comorbidities and bilateral AKA.  She seems to be a poor surgical candidate due to her medical comorbidities.  Would likely consider palliative chemo with Gemzar dose reduced to 800 mg/m² day 1 and day 15 of a 28-day cycle for better tolerance.  Reviewed possible side effects of chemotherapy including but not limited to cytopenia, febrile neutropenia, nausea, vomiting, hair loss.  11/28/2024: Patient is planned for port placement on Friday.  We will need patient's height prior to proceeding with chemotherapy plan.  I have reached out to bedside RN to obtain her height  11/29/2024: She is planned for port placement today.  We will plan to start Gemzar at 800 mg/m² as outpatient on day 1 and day 15 of a 28-day cycle.  She will be given appointments for chemo education.    *Elevated T bilirubin, resolvedWith CBD stent placement     *Anemia of chronic disease  Upon lab review she has had anemia since at least January 2022 with hemoglobin around 10.  11/27/2024 hemoglobin 10.8.  Lab evaluation consistent with anemia of chronic disease  11/28/2024 CBC pending     *Chronic osteomyelitis with chronic stage IV sacral decub ulcer  No antibiotics recommended per infectious disease.     *Other medical comorbidities-diabetes type 2, hypertension, hyperlipidemia, bilateral above-knee amputations, A-fib on Eliquis     RECOMMENDATIONS/PLAN:   Obtain  Caris testing with  [facility] on the CBD brushing  We Will consider genetic testing/counseling as outpatient  Plan to administer  dose reduced 800 mg/m² day 1 and day 15 of 28-day cycle for tolerance  She will be given chemo education appointment; and appointment with me on cycle 1 day 1 Gemzar  CBC with differential daily while inpatient.  We will follow along      Fay Ramon MD      Electronically signed by Fay Ramon MD at 24 0912       Deejay Mann MD at 24 0807              Name: Chantal Kumar ADMIT: 2024   : 1950  PCP: Sasha Bui MD    MRN: [id number] LOS: 9 days   AGE/SEX: 74 y.o. female  ROOM: [facility]     Subjective   Subjective   Feeling good again this AM. No N/V/abd pain.  NPO for OR later. No F/C/NS. No SOA or CP. Voiding well.      Objective   Objective   Vital Signs  Temp:  [97.1 °F (36.2 °C)-98.5 °F (36.9 °C)] 98 °F (36.7 °C)  Heart Rate:  [70-93] 91  Resp:  [16-18] 16  BP: (106-116)/(59-69) 107/62  SpO2:  [92 %-93 %] 92 %  on  Flow (L/min) (Oxygen Therapy):  [2] 2;   Device (Oxygen Therapy): room air  Body mass index is 77.79 kg/m².    (No change in exam today)    Physical Exam  Vitals and nursing note reviewed.   Constitutional:       General: She is not in acute distress.     Appearance: She is not ill-appearing, toxic-appearing or diaphoretic.   HENT:      Head: Normocephalic.      Nose: Nose normal.      Mouth/Throat:      Mouth: Mucous membranes are moist.      Pharynx: Oropharynx is clear.   Eyes:      General: No scleral icterus.        Right eye: No discharge.         Left eye: No discharge.      Conjunctiva/sclera: Conjunctivae normal.   Cardiovascular:      Rate and Rhythm: Normal rate and regular rhythm.   Pulmonary:      Effort: Pulmonary effort is normal. No respiratory distress.      Breath sounds: Normal breath sounds. No wheezing or rales.   Abdominal:      General: Bowel sounds are normal. There is no distension.      Palpations: Abdomen is soft.       Tenderness: There is no abdominal tenderness.   Musculoskeletal:      Cervical back: Neck supple.      Comments: S/p bilateral AKA   Skin:     General: Skin is warm and dry.      Capillary Refill: Capillary refill takes less than 2 seconds.      Coloration: Skin is not jaundiced.   Neurological:      General: No focal deficit present.      Mental Status: She is alert and oriented to person, place, and time. Mental status is at baseline.      Cranial Nerves: No cranial nerve deficit.   Psychiatric:         Mood and Affect: Mood normal.         Behavior: Behavior normal.         Thought Content: Thought content normal.       Results Review     I reviewed the patient's new clinical results.  Results from last 7 days   Lab Units 11/28/24  0614 11/27/24 0624 11/26/24 0458 11/25/24  0518   WBC 10*3/mm3 4.12 5.28 5.05 4.49   HEMOGLOBIN g/dL 10.7* 10.8* 10.3* 9.9*   PLATELETS 10*3/mm3 155 140 137* 148     Results from last 7 days   Lab Units 11/28/24  0614 11/27/24 0624 11/26/24 0458 11/24/24  0628   SODIUM mmol/L 135* 132* 136 138   POTASSIUM mmol/L 3.9 4.2 3.7 3.9   CHLORIDE mmol/L 104 103 102 108*   CO2 mmol/L 26.0 22.0 24.2 21.0*   BUN mg/dL 13 10 8 9   CREATININE mg/dL 0.70 0.71 0.74 0.58   GLUCOSE mg/dL 263* 338* 347* 202*   EGFR mL/min/1.73 90.9 89.4 85.0 95.1     Results from last 7 days   Lab Units 11/28/24  0614 11/27/24 0624 11/26/24 0458 11/24/24  0628   ALBUMIN g/dL 2.6* 2.5* 2.4* 2.4*   BILIRUBIN mg/dL 0.9 1.0 1.2 1.4*   ALK PHOS U/L 301* 354* 390* 571*   AST (SGOT) U/L 17 14 18 26   ALT (SGPT) U/L 28 33 37* 59*     Results from last 7 days   Lab Units 11/28/24  0614 11/27/24 0624 11/26/24  1848 11/26/24 0458 11/24/24 0628   CALCIUM mg/dL 8.7 8.3*  --  8.1* 8.3*   ALBUMIN g/dL 2.6* 2.5*  --  2.4* 2.4*   MAGNESIUM mg/dL 2.1 2.4  --  2.0  --    PHOSPHORUS mg/dL 2.7 2.6 1.9* 2.0*  --            Glucose   Date/Time Value Ref Range Status   11/29/2024 0616 281 (H) 70 - 130 mg/dL Final   11/28/2024 0823 589  (H) 70 - 130 mg/dL Final   11/28/2024 1750 284 (H) 70 - 130 mg/dL Final   11/28/2024 1158 309 (H) 70 - 130 mg/dL Final   11/28/2024 0806 261 (H) 70 - 130 mg/dL Final   11/27/2024 2025 390 (H) 70 - 130 mg/dL Final   11/27/2024 1812 405 (C) 70 - 130 mg/dL Final       CT Chest With Contrast Diagnostic    Result Date: 11/27/2024  Multiple right greater than left bilateral solid subcentimeter pulmonary nodules measuring up to 8 mm will need continued follow-up as pulmonary metastatic disease cannot be excluded. Outside prior imaging of the chest, if available, would be helpful to demonstrate nodule stability.  This report was finalized on 11/27/2024 1:22 PM by Dr. Grady Sandoval M.D on Workstation: [id number]       I have personally reviewed all medications:  Scheduled Medications  [Held by provider] apixaban, 5 mg, Oral, Q12H  aspirin, 81 mg, Oral, Daily  atorvastatin, 10 mg, Oral, Daily  baclofen, 10 mg, Oral, BID  buPROPion XL, 150 mg, Oral, Daily  busPIRone, 15 mg, Oral, TID  cetirizine, 10 mg, Oral, Daily  cholestyramine light, 1 packet, Oral, Daily  cilostazol, 100 mg, Oral, BID  digoxin, 125 mcg, Oral, Daily  ferrous sulfate, 325 mg, Oral, Daily With Breakfast  insulin glargine, 60 Units, Subcutaneous, Nightly  insulin lispro, 2-7 Units, Subcutaneous, 4x Daily AC & at Bedtime  levETIRAcetam, 500 mg, Oral, BID  milnacipran, 50 mg, Oral, Q12H  multivitamin with minerals, 1 tablet, Oral, Daily  polyethylene glycol, 17 g, Oral, Daily  prazosin, 1 mg, Oral, Nightly  QUEtiapine, 50 mg, Oral, Nightly  senna-docusate sodium, 2 tablet, Oral, BID  sodium chloride, 10 mL, Intravenous, Q12H  traZODone, 50 mg, Oral, Nightly    Infusions   Diet  NPO Diet NPO Type: Strict NPO    I have personally reviewed:  [x]  Laboratory   []  Microbiology   []  Radiology   []  EKG/Telemetry  []  Cardiology/Vascular   []  Pathology    []  Records      Assessment/Plan     Active Hospital Problems    Diagnosis  POA    **Bile duct obstruction  "[K83.1]  Yes    Pancreatic adenocarcinoma [C25.9]  Yes    Malignant neoplasm of other parts of pancreas [C25.7]  Unknown    Persistent atrial fibrillation [I48.19]  Yes    S/P AKA (above knee amputation) bilateral [Z89.611, Z89.612]  Not Applicable    Chronic diastolic CHF (congestive heart failure) [I50.32]  Yes    History of CVA (cerebrovascular accident) [Z86.73]  Not Applicable    Bipolar disorder, unspecified [F31.9]  Yes    Secondary hypertension [I15.9]  Yes    Type 2 diabetes mellitus with diabetic neuropathy, unspecified [E11.40]  Yes      Resolved Hospital Problems   No resolved problems to display.       74 y.o. female admitted with biliary obstruction.    Biliary obstruction due to stricture  Pancreatic adenoCA  -ERCP unsuccessful on 11/21, second attempt on 11/24 was successful and stent placed  - elevated at 904  -path showed adenoCA  -GI recommends Heme/Onc here at [facility] and appt with Dr. Carter (Surg Onc) at [facility]  -Heme/Onc has seen and plans chemotx, outpt chemotx education appt on 12/12 with plans to start on 12/19  -Gen Surg consulted for port placement (today)  -tolerating regular diet  -LFTs, TBili, and AlkPhos all improved     Chronic osteomyelitis associated with chronic stage 4 sacral decubitus ulcer  -PCT wnl, CRP elevated  -pictures of wound in chart, the wound does not appear infected by exam on admission  -ID consulted and recommend no antibiotics, f/u outpatient with wound care  -She was seen by wound care here in the hospital and they recommend: \"Cleanse with NS - using q tip placed a piece of opticel into the wound space - apply a 4x4 or 6x6 optifoam by folding and adequately placing in the gluteal cleft assuring the foam portion of the dressing makes contact with skin; change every other day\"     Atrial fibrillation  Chronic AC (Eliquis)  -metoprolol on hold for now as BPs are on the low side  -continue digoxin  -HRs fine so far and BPs somewhat improved  -patient has been seen " by Cardiology in the hospital  -continue aspirin  -GI okay with restarting Eliquis 11/25--on hold now for port placement today     PAD  Bilateral AKA  -continue aspirin and Pletal     Seizure history  -continue Keppra  -no seizure activity here     Type 2 DM  -sugars high as po intake increasing  -increase Lantus to 70 units  -continue SSI  -A1c 8.2    Chronic hypoxic resp failure  -stable on 2L/min by NC    Hypophosphatemia  -replaced with protocol    Itching ears  -trial of Zyrtec      Eliquis should suffice for DVT prophylaxis.  Full code.  Discussed with patient.  Anticipate discharge back to SNU facility on Saturday.  Expected Discharge Date: 11/30/2024; Expected Discharge Time:       Deejay Mann MD  [facility]  11/29/24  08:07 EST      Electronically signed by Deejay Mann MD at 11/29/24 0813       Shade Lacy MD at 11/28/24 1357          Colorectal & General Surgery  Progress Note    Patient: Chantal Kumar  YOB: 1950  MRN: [id number]      Assessment  Chantal Kumar is a 74 y.o. female with newly diagnosed biliary adenocarcinoma.  Plan for port placement tomorrow.  We discussed the risk, benefits, alternatives the procedure.  N.p.o. after midnight.  Informed consent obtained.    Subjective  No significant events.  In good spirits today.  Complains of some right upper quadrant pain.    Objective    Vitals:    11/28/24 1224   BP:    Pulse: 70   Resp:    Temp:    SpO2:        Physical Exam  Constitutional: Well-developed well-nourished, no acute distress  Neck: Supple, trachea midline  Respiratory: No increased work of breathing, Symmetric excursion  Cardiovascular: Well pefursed, no jugular venous distention evident   Abdominal: Soft, non-tender, non-distended  Skin: Warm, dry, no rash on visualized skin surfaces  Psychiatric: Alert and oriented ×3, normal affect     Laboratory Results  I have personally reviewed CBC with WC 4, hemoglobin 10,  platelets 135.  CMP with creatinine 0.7, AST 17, ALT 20, total bilirubin 0.9, albumin 2.6.    Radiology  No new imaging to review         César Lacy MD  Colorectal & General Surgery  [facility]    [address], [address]  [place], [place], [address]  P: [phone]  F: [phone]       Electronically signed by Shade Lacy MD at 11/28/24 8644       Fay Ramon MD at 11/28/24 0843          [facility] GROUP INPATIENT PROGRESS NOTE    Length of Stay:  8 days    CHIEF COMPLAINT/REASON FOR VISIT:  Pancreatic adenocarcinoma  Pulmonary nodules    SUBJECTIVE:   11/28/2024: Patient resting at the time of my visit.  She denies any concerns or complaints.    ROS:  All systems reviewed and found to be negative except for that noted above    OBJECTIVE:  Vitals:    11/27/24 1207 11/27/24 1732 11/27/24 2027 11/28/24 0505   BP:  106/62 135/71 93/54   BP Location:  Right arm Right arm Right arm   Patient Position:  Lying Lying Lying   Pulse: 88 79 68 87   Resp:  18 18 18   Temp:  98 °F (36.7 °C) 97.8 °F (36.6 °C) 97.6 °F (36.4 °C)   TempSrc:  Oral Oral Oral   SpO2:  92% 93% 91%   Weight:             PHYSICAL EXAMINATION:  General: Obese female in no acute distress.  AOx3  Chest/Lungs: CTABL.  No Rales rhonchi wheezing  Heart: S1-S2 normal.  No murmurs  Abdomen/GI: Soft, bowel sounds present  Extremities: Status post bilateral AKA    DIAGNOSTIC DATA:  Results Review:     I reviewed the patient's new clinical results.    Results from last 7 days   Lab Units 11/27/24  0624 11/26/24 0458 11/25/24  0518   WBC 10*3/mm3 5.28 5.05 4.49   HEMOGLOBIN g/dL 10.8* 10.3* 9.9*   HEMATOCRIT % 34.0 35.3 33.9*   PLATELETS 10*3/mm3 140 137* 148      Results from last 7 days   Lab Units 11/28/24  0614 11/27/24  0624 11/26/24  0458   SODIUM mmol/L 135* 132* 136   POTASSIUM mmol/L 3.9 4.2 3.7   CHLORIDE mmol/L 104 103 102   CO2 mmol/L 26.0 22.0 24.2   BUN mg/dL 13 10 8   CREATININE mg/dL 0.70 0.71 0.74    CALCIUM mg/dL 8.7 8.3* 8.1*   BILIRUBIN mg/dL 0.9 1.0 1.2   ALK PHOS U/L 301* 354* 390*   ALT (SGPT) U/L 28 33 37*   AST (SGOT) U/L 17 14 18   GLUCOSE mg/dL 263* 338* 347*      Lab Results   Component Value Date    NEUTROABS 3.57 11/26/2024     Results from last 7 days   Lab Units 11/23/24  0626   INR  1.27*     Results from last 7 days   Lab Units 11/28/24  0614   MAGNESIUM mg/dL 2.1       Assessment & Plan   ASSESSMENT/PLAN:  This is a 74 y.o. female with:     *Adenocarcinoma, likely pancreatic  *Multiple bilateral pulmonary nodules, subcentimeter  *Poor performance status, ECOG 3-most likely secondary to bilateral AKA and other medical comorbidities  Presented with abdominal discomfort, found to have total bilirubin elevated at 5.2  11/21/2022 ERCP with unsuccessful cannulization.  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  11/21/2024 Ca 19-9 - 904   11/22/2024-CT abdomen-a 2.9 cm hypoenhancing pancreatic head mass concerning for pancreatic adenocarcinoma.  Mass encases and severely narrows main portal vein at the portal venous confluence.  Mass causes moderate intra and extrahepatic biliary ductal dilatation.  No abdominal lymphadenopathy.  No abdominal omental or peritoneal nodularity.   11/24/2024 ERCP with sphincterotomy, brushing and stent placement by Dr. Malave.  IR consult was then canceled.    11/24/2024 brushings of the CBD revealed atypical ductal cells Suspicious for well-differentiated adenocarcinoma  11/27/2024 CT chest-multiple right greater than left bilateral solid subcentimeter pulmonary nodules measuring up to 8 mm.  Pulmonary metastatic disease cannot be excluded.  11/27/2024-I have reviewed the imaging, pathology and discussed the diagnosis and management with the patient.  She has a 2.9 cm hypoenhancing pancreatic head mass, elevated CA 19-9, and CBD brushings suspicious for well-differentiated adenocarcinoma.  She likely has pancreatic adenocarcinoma.  I have discussed  next steps in management-obtaining a PET scan as an outpatient to complete staging; and referral to surgical oncology at [facility] for surgical management opinion.  Unfortunately, patient is a nursing home resident.  She does have poor performance status mainly due to her medical comorbidities and bilateral AKA.  She seems to be a poor surgical candidate due to her medical comorbidities.  Would likely consider palliative chemo with Gemzar dose reduced to 800 mg/m² day 1 and day 15 of a 28-day cycle for better tolerance.  Reviewed possible side effects of chemotherapy including but not limited to cytopenia, febrile neutropenia, nausea, vomiting, hair loss.  11/28/2024: Patient is planned for port placement on Friday.  We will need patient's height prior to proceeding with chemotherapy plan.  I have reached out to bedside RN to obtain her height     *Anemia of chronic disease  Upon lab review she has had anemia since at least January 2022 with hemoglobin around 10.  11/27/2024 hemoglobin 10.8.  Lab evaluation consistent with anemia of chronic disease  11/28/2024 CBC pending     *Chronic osteomyelitis with chronic stage IV sacral decub ulcer  No antibiotics recommended per infectious disease.     *Other medical comorbidities-diabetes type 2, hypertension, hyperlipidemia, bilateral above-knee amputations, A-fib on Eliquis     RECOMMENDATIONS/PLAN:   We will obtain Caris testing with [employer] on the CBD brushing  We Will consider genetic testing/counseling as outpatient  We will need patient's height prior to entering chemotherapy plan.  I have reached out to bedside RN.  Patient willing to proceed with single agent Gemzar.  Plan to administer  dose reduced 800 mg/m² day 1 and day 15 of 28-day cycle for tolerance  Dr. Lacy to place a port on Friday.  She will be given chemo education appointment; and appointment with me on cycle 1 day 1 Gemzar  Primary team updated.  CBC with differential daily while inpatient.  We will  follow along      Fay Ramon MD      Electronically signed by Fay Ramon MD at 24 0851       Deejay Mann MD at 24 0822              Name: Chantal Kumar ADMIT: 2024   : 1950  PCP: Sasha Bui MD    MRN: [id number] LOS: 8 days   AGE/SEX: 74 y.o. female  ROOM: [facility]     Subjective   Subjective   Feeling good again this AM. No N/V/abd pain. Tolerating regular diet. No F/C/NS. No SOA or CP. Voiding well.      Objective   Objective   Vital Signs  Temp:  [97.6 °F (36.4 °C)-98 °F (36.7 °C)] 97.6 °F (36.4 °C)  Heart Rate:  [68-88] 87  Resp:  [16-18] 18  BP: ()/(54-71) 93/54  SpO2:  [91 %-94 %] 91 %  on  Flow (L/min) (Oxygen Therapy):  [2] 2;   Device (Oxygen Therapy): nasal cannula  Body mass index is 30.02 kg/m².    (No change in exam today)    Physical Exam  Vitals and nursing note reviewed.   Constitutional:       General: She is not in acute distress.     Appearance: She is not ill-appearing, toxic-appearing or diaphoretic.   HENT:      Head: Normocephalic.      Nose: Nose normal.      Mouth/Throat:      Mouth: Mucous membranes are moist.      Pharynx: Oropharynx is clear.   Eyes:      General: No scleral icterus.        Right eye: No discharge.         Left eye: No discharge.      Conjunctiva/sclera: Conjunctivae normal.   Cardiovascular:      Rate and Rhythm: Normal rate and regular rhythm.   Pulmonary:      Effort: Pulmonary effort is normal. No respiratory distress.      Breath sounds: Normal breath sounds. No wheezing or rales.   Abdominal:      General: Bowel sounds are normal. There is no distension.      Palpations: Abdomen is soft.      Tenderness: There is no abdominal tenderness.   Musculoskeletal:      Cervical back: Neck supple.      Comments: S/p bilateral AKA   Skin:     General: Skin is warm and dry.      Capillary Refill: Capillary refill takes less than 2 seconds.      Coloration: Skin is not jaundiced.   Neurological:      General: No focal  deficit present.      Mental Status: She is alert and oriented to person, place, and time. Mental status is at baseline.      Cranial Nerves: No cranial nerve deficit.   Psychiatric:         Mood and Affect: Mood normal.         Behavior: Behavior normal.         Thought Content: Thought content normal.       Results Review     I reviewed the patient's new clinical results.  Results from last 7 days   Lab Units 11/27/24  0624 11/26/24  0458 11/25/24  0518 11/24/24  0628   WBC 10*3/mm3 5.28 5.05 4.49 5.33   HEMOGLOBIN g/dL 10.8* 10.3* 9.9* 10.4*   PLATELETS 10*3/mm3 140 137* 148 167     Results from last 7 days   Lab Units 11/28/24  0614 11/27/24 0624 11/26/24 0458 11/24/24  0628   SODIUM mmol/L 135* 132* 136 138   POTASSIUM mmol/L 3.9 4.2 3.7 3.9   CHLORIDE mmol/L 104 103 102 108*   CO2 mmol/L 26.0 22.0 24.2 21.0*   BUN mg/dL 13 10 8 9   CREATININE mg/dL 0.70 0.71 0.74 0.58   GLUCOSE mg/dL 263* 338* 347* 202*   EGFR mL/min/1.73 90.9 89.4 85.0 95.1     Results from last 7 days   Lab Units 11/28/24  0614 11/27/24 0624 11/26/24 0458 11/24/24  0628   ALBUMIN g/dL 2.6* 2.5* 2.4* 2.4*   BILIRUBIN mg/dL 0.9 1.0 1.2 1.4*   ALK PHOS U/L 301* 354* 390* 571*   AST (SGOT) U/L 17 14 18 26   ALT (SGPT) U/L 28 33 37* 59*     Results from last 7 days   Lab Units 11/28/24  0614 11/27/24 0624 11/26/24  1848 11/26/24 0458 11/24/24  0628 11/23/24  0626 11/22/24  0742   CALCIUM mg/dL 8.7 8.3*  --  8.1* 8.3*   < > 8.4*   ALBUMIN g/dL 2.6* 2.5*  --  2.4* 2.4*   < > 2.3*   MAGNESIUM mg/dL 2.1 2.4  --  2.0  --   --  2.4   PHOSPHORUS mg/dL 2.7 2.6 1.9* 2.0*  --   --   --     < > = values in this interval not displayed.           Hemoglobin A1C   Date/Time Value Ref Range Status   11/26/2024 0458 8.20 (H) 4.80 - 5.60 % Final     Glucose   Date/Time Value Ref Range Status   11/28/2024 0806 261 (H) 70 - 130 mg/dL Final   11/27/2024 2025 390 (H) 70 - 130 mg/dL Final   11/27/2024 1812 405 (C) 70 - 130 mg/dL Final   11/27/2024 1711 439 (C)  70 - 130 mg/dL Final   11/27/2024 1140 352 (H) 70 - 130 mg/dL Final   11/27/2024 0620 313 (H) 70 - 130 mg/dL Final   11/26/2024 2155 322 (H) 70 - 130 mg/dL Final       CT Chest With Contrast Diagnostic    Result Date: 11/27/2024  Multiple right greater than left bilateral solid subcentimeter pulmonary nodules measuring up to 8 mm will need continued follow-up as pulmonary metastatic disease cannot be excluded. Outside prior imaging of the chest, if available, would be helpful to demonstrate nodule stability.  This report was finalized on 11/27/2024 1:22 PM by Dr. Grady Sandoval M.D on Workstation: [id number]       I have personally reviewed all medications:  Scheduled Medications  [Held by provider] apixaban, 5 mg, Oral, Q12H  aspirin, 81 mg, Oral, Daily  atorvastatin, 10 mg, Oral, Daily  baclofen, 10 mg, Oral, BID  buPROPion XL, 150 mg, Oral, Daily  busPIRone, 15 mg, Oral, TID  cetirizine, 10 mg, Oral, Daily  cholestyramine light, 1 packet, Oral, Daily  cilostazol, 100 mg, Oral, BID  digoxin, 125 mcg, Oral, Daily  ferrous sulfate, 325 mg, Oral, Daily With Breakfast  insulin glargine, 60 Units, Subcutaneous, Nightly  insulin lispro, 2-7 Units, Subcutaneous, 4x Daily AC & at Bedtime  levETIRAcetam, 500 mg, Oral, BID  milnacipran, 50 mg, Oral, Q12H  multivitamin with minerals, 1 tablet, Oral, Daily  polyethylene glycol, 17 g, Oral, Daily  prazosin, 1 mg, Oral, Nightly  QUEtiapine, 50 mg, Oral, Nightly  senna-docusate sodium, 2 tablet, Oral, BID  sodium chloride, 10 mL, Intravenous, Q12H  traZODone, 50 mg, Oral, Nightly    Infusions   Diet  Diet: Regular/House, Gastrointestinal, Diabetic; Consistent Carbohydrate; Fat-Restricted; Fluid Consistency: Thin (IDDSI 0)    I have personally reviewed:  [x]  Laboratory   []  Microbiology   []  Radiology   []  EKG/Telemetry  []  Cardiology/Vascular   [x]  Pathology    []  Records      Assessment/Plan     Active Hospital Problems    Diagnosis  POA    **Bile duct obstruction  "[K83.1]  Yes    Pancreatic adenocarcinoma [C25.9]  Yes    Malignant neoplasm of other parts of pancreas [C25.7]  Unknown    Persistent atrial fibrillation [I48.19]  Yes    S/P AKA (above knee amputation) bilateral [Z89.611, Z89.612]  Not Applicable    Chronic diastolic CHF (congestive heart failure) [I50.32]  Yes    History of CVA (cerebrovascular accident) [Z86.73]  Not Applicable    Bipolar disorder, unspecified [F31.9]  Yes    Secondary hypertension [I15.9]  Yes    Type 2 diabetes mellitus with diabetic neuropathy, unspecified [E11.40]  Yes      Resolved Hospital Problems   No resolved problems to display.       74 y.o. female admitted with biliary obstruction.    Biliary obstruction due to stricture  Pancreatic mass   -ERCP unsuccessful on 11/21, second attempt on 11/24 was successful and stent placed  - elevated at 904  -path showed adenoCA  -GI recommends Heme/Onc here at [facility] and appt with Dr. Carter (Surg Onc) at [facility]  -Heme/Onc has seen and plans chemotx, outpt chemotx education appt on 12/12 with plans to start on 12/19  -Gen Surg consulted for port placement (Friday)  -tolerating regular diet  -LFTs, TBili, and AlkPhos all improved     Chronic osteomyelitis associated with chronic stage 4 sacral decubitus ulcer  -PCT wnl, CRP elevated  -pictures of wound in chart, the wound does not appear infected by exam on admission  -ID consulted and recommend no antibiotics, f/u outpatient with wound care  -She was seen by wound care here in the hospital and they recommend: \"Cleanse with NS - using q tip placed a piece of opticel into the wound space - apply a 4x4 or 6x6 optifoam by folding and adequately placing in the gluteal cleft assuring the foam portion of the dressing makes contact with skin; change every other day\"     Atrial fibrillation  Chronic AC (Eliquis)  -metoprolol on hold for now as BPs were on the low side  -continue digoxin  -HRs fine so far and BPs somewhat improved  -patient has been seen " by Cardiology in the hospital  -continue aspirin  -GI okay with restarting Eliquis --on hold now for port placement tomorrow     PAD  Bilateral AKA  -continue aspirin and Pletal     Seizure history  -continue Keppra  -no seizure activity here     Type 2 DM  -sugars high as po intake increasing  -increased Lantus to 60 units last night  -continue SSI  -A1c 8.2    Chronic hypoxic resp failure  -stable on 2L/min by NC    Hypophosphatemia  -replaced with protocol    Itching ears  -trial of Zyrtec      Eliquis should suffice for DVT prophylaxis.  Full code.  Discussed with patient.  Anticipate discharge back to SNU facility on Saturday.  Expected Discharge Date: 2024; Expected Discharge Time:       Deejay Mann MD  [facility]  24  08:29 EST      Electronically signed by Deejay Mann MD at 2432       Deejay Mann MD at 24 0842              Name: Chantal Kumar ADMIT: 2024   : 1950  PCP: Sasha Bui MD    MRN: [id number] LOS: 7 days   AGE/SEX: 74 y.o. female  ROOM: [facility]     Subjective   Subjective   Feeling good again this AM. No N/V/abd pain. Tolerating regular diet. No F/C/NS. No SOA or CP. Voiding well.      Objective   Objective   Vital Signs  Temp:  [97.5 °F (36.4 °C)-99.7 °F (37.6 °C)] 98.3 °F (36.8 °C)  Heart Rate:  [76-97] 88  Resp:  [16-18] 16  BP: ()/(63-76) 107/65  SpO2:  [92 %-95 %] 93 %  on  Flow (L/min) (Oxygen Therapy):  [2] 2;   Device (Oxygen Therapy): nasal cannula  Body mass index is 30.02 kg/m².    (No change in exam today)    Physical Exam  Vitals and nursing note reviewed.   Constitutional:       General: She is not in acute distress.     Appearance: She is not ill-appearing, toxic-appearing or diaphoretic.   HENT:      Head: Normocephalic.      Nose: Nose normal.      Mouth/Throat:      Mouth: Mucous membranes are moist.      Pharynx: Oropharynx is clear.   Eyes:      General: No scleral icterus.         Right eye: No discharge.         Left eye: No discharge.      Conjunctiva/sclera: Conjunctivae normal.   Cardiovascular:      Rate and Rhythm: Normal rate and regular rhythm.   Pulmonary:      Effort: Pulmonary effort is normal. No respiratory distress.      Breath sounds: Normal breath sounds. No wheezing or rales.   Abdominal:      General: Bowel sounds are normal. There is no distension.      Palpations: Abdomen is soft.      Tenderness: There is no abdominal tenderness.   Musculoskeletal:      Cervical back: Neck supple.      Comments: S/p bilateral AKA   Skin:     General: Skin is warm and dry.      Capillary Refill: Capillary refill takes less than 2 seconds.      Coloration: Skin is not jaundiced.   Neurological:      General: No focal deficit present.      Mental Status: She is alert and oriented to person, place, and time. Mental status is at baseline.      Cranial Nerves: No cranial nerve deficit.   Psychiatric:         Mood and Affect: Mood normal.         Behavior: Behavior normal.         Thought Content: Thought content normal.       Results Review     I reviewed the patient's new clinical results.  Results from last 7 days   Lab Units 11/27/24  0624 11/26/24 0458 11/25/24  0518 11/24/24  0628   WBC 10*3/mm3 5.28 5.05 4.49 5.33   HEMOGLOBIN g/dL 10.8* 10.3* 9.9* 10.4*   PLATELETS 10*3/mm3 140 137* 148 167     Results from last 7 days   Lab Units 11/27/24  0624 11/26/24  0458 11/24/24  0628 11/23/24  1316 11/23/24  0626   SODIUM mmol/L 132* 136 138  --  139   POTASSIUM mmol/L 4.2 3.7 3.9 3.8 3.6   CHLORIDE mmol/L 103 102 108*  --  109*   CO2 mmol/L 22.0 24.2 21.0*  --  20.3*   BUN mg/dL 10 8 9  --  12   CREATININE mg/dL 0.71 0.74 0.58  --  0.64   GLUCOSE mg/dL 338* 347* 202*  --  82   EGFR mL/min/1.73 89.4 85.0 95.1  --  92.9     Results from last 7 days   Lab Units 11/27/24  0624 11/26/24  0458 11/24/24  0628 11/23/24  0626   ALBUMIN g/dL 2.5* 2.4* 2.4* 2.1*   BILIRUBIN mg/dL 1.0 1.2 1.4* 2.1*   ALK  PHOS U/L 354* 390* 571* 731*   AST (SGOT) U/L 14 18 26 40*   ALT (SGPT) U/L 33 37* 59* 78*     Results from last 7 days   Lab Units 11/27/24  0624 11/26/24  1848 11/26/24  0458 11/24/24  0628 11/23/24  0626 11/22/24  0742   CALCIUM mg/dL 8.3*  --  8.1* 8.3* 8.4* 8.4*   ALBUMIN g/dL 2.5*  --  2.4* 2.4* 2.1* 2.3*   MAGNESIUM mg/dL 2.4  --  2.0  --   --  2.4   PHOSPHORUS mg/dL 2.6 1.9* 2.0*  --   --   --      Results from last 7 days   Lab Units 11/20/24  1348   PROCALCITONIN ng/mL 0.12     Hemoglobin A1C   Date/Time Value Ref Range Status   11/26/2024 0458 8.20 (H) 4.80 - 5.60 % Final     Glucose   Date/Time Value Ref Range Status   11/27/2024 0620 313 (H) 70 - 130 mg/dL Final   11/26/2024 2155 322 (H) 70 - 130 mg/dL Final   11/26/2024 1726 370 (H) 70 - 130 mg/dL Final   11/26/2024 1336 391 (H) 70 - 130 mg/dL Final   11/26/2024 1200 415 (C) 70 - 130 mg/dL Final   11/26/2024 0619 347 (H) 70 - 130 mg/dL Final   11/25/2024 2143 373 (H) 70 - 130 mg/dL Final       No radiology results for the last day    I have personally reviewed all medications:  Scheduled Medications  apixaban, 5 mg, Oral, Q12H  aspirin, 81 mg, Oral, Daily  atorvastatin, 10 mg, Oral, Daily  baclofen, 10 mg, Oral, BID  buPROPion XL, 150 mg, Oral, Daily  busPIRone, 15 mg, Oral, TID  cetirizine, 10 mg, Oral, Daily  cholestyramine light, 1 packet, Oral, Daily  cilostazol, 100 mg, Oral, BID  digoxin, 125 mcg, Oral, Daily  ferrous sulfate, 325 mg, Oral, Daily With Breakfast  insulin glargine, 45 Units, Subcutaneous, Nightly  insulin lispro, 2-7 Units, Subcutaneous, 4x Daily AC & at Bedtime  levETIRAcetam, 500 mg, Oral, BID  milnacipran, 50 mg, Oral, Q12H  multivitamin with minerals, 1 tablet, Oral, Daily  prazosin, 1 mg, Oral, Nightly  QUEtiapine, 50 mg, Oral, Nightly  sodium chloride, 10 mL, Intravenous, Q12H  traZODone, 50 mg, Oral, Nightly    Infusions   Diet  Diet: Regular/House, Gastrointestinal, Diabetic; Consistent Carbohydrate; Fat-Restricted; Fluid  "Consistency: Thin (IDDSI 0)    I have personally reviewed:  [x]  Laboratory   []  Microbiology   []  Radiology   []  EKG/Telemetry  []  Cardiology/Vascular   []  Pathology    []  Records      Assessment/Plan     Active Hospital Problems    Diagnosis  POA    **Bile duct obstruction [K83.1]  Yes    Persistent atrial fibrillation [I48.19]  Yes    S/P AKA (above knee amputation) bilateral [Z89.611, Z89.612]  Not Applicable    Chronic diastolic CHF (congestive heart failure) [I50.32]  Yes    History of CVA (cerebrovascular accident) [Z86.73]  Not Applicable    Bipolar disorder, unspecified [F31.9]  Yes    Secondary hypertension [I15.9]  Yes    Type 2 diabetes mellitus with diabetic neuropathy, unspecified [E11.40]  Yes      Resolved Hospital Problems   No resolved problems to display.       74 y.o. female admitted with biliary obstruction.    Biliary obstruction due to stricture  Pancreatic mass   -ERCP unsuccessful on 11/21, second attempt on 11/24 was successful and stent placed  - is elevated at 904  -path showed adenoCA  -have asked GI to revisit and placed consult for Heme/Onc to see  -tolerating regular diet  -LFTs, TBili, and AlkPhos all trending down     Chronic osteomyelitis associated with chronic stage 4 sacral decubitus ulcer  -procal nl, CRP elevated  -pictures of wound in chart, the wound does not appear infected by exam on admission  -ID consulted and recommend no antibiotics, f/u outpatient with wound care  -She was seen by wound care here in the hospital and they recommend: \"Cleanse with NS - using q tip placed a piece of opticel into the wound space - apply a 4x4 or 6x6 optifoam by folding and adequately placing in the gluteal cleft assuring the foam portion of the dressing makes contact with skin; change every other day\"     Atrial fibrillation  Chronic AC (Eliquis)  -metoprolol on hold for now as BPs were on the low side  -continue digoxin  -HRs fine so far and BPs somewhat improved  -patient " has been seen by Cardiology in the hospital  -continue aspirin  -GI okay with restarting Eliquis 11/25     PAD  Bilateral AKA  -continue aspirin and Pletal     Seizure history  -continue Keppra  -no seizure activity here     Type 2 DM  -sugars high as po intake increasing  -increase Lantus to 60 units tonight  -continue SSI  -A1c 8.2    Chronic hypoxic resp failure  -stable on 2L/min by NC    Hypophosphatemia  -replaced with protocol    Itching ears  -trial of Zyrtec      Eliquis should suffice for DVT prophylaxis.  Full code.  Discussed with patient, RN, and GI provider.  Anticipate discharge back to SNU facility when cleared by consultants.  Expected Discharge Date: 11/29/2024; Expected Discharge Time:       Deejay aMnn MD  [facility]  11/27/24  08:42 EST      Electronically signed by Deejay Mann MD at 11/27/24 0855          Consult Notes (last 5 days)        Shade Lacy MD at 11/27/24 1611        Consult Orders    1. Inpatient General Surgery Consult For Implanted Port Placement [[id number]] ordered by Fay Ramon MD at 11/27/24 1419                 Colorectal & General Surgery  Consultation    Patient: Chantal Kumar  YOB: 1950  MRN: [id number]      Assessment  Chantal Kumar is a 74 y.o. female with newly diagnosed biliary adenocarcinoma.  She wishes to proceed with systemic chemotherapy after discussing with Dr. Ramon.  I discussed the role of port with her.  We are unable to place a port tomorrow secondary to the holiday, but I have her on the schedule for Friday.  I will go ahead and hold her anticoagulation at this time and make her n.p.o. after midnight on Friday morning.    History of Present Illness   Chantal Kumar is a 74 y.o. female who was recently diagnosed with biliary adenocarcinoma.  She says that she wishes to start chemotherapy.  She has no other complaints today.    Past Medical History   Past Medical History:    Diagnosis Date    Arthritis     Bipolar disorder, unspecified     Borderline personality disorder     CHF (congestive heart failure)     Chronic pain     COPD (chronic obstructive pulmonary disease)     Depression     Diabetes mellitus     Dysphagia     Generalized anxiety disorder     GERD (gastroesophageal reflux disease)     Hyperlipidemia     Hypertension     Migraine     Nightmare disorder     Osteoporosis     Pressure ulcer of unspecified site, stage 4     Seizures     Stroke     Type 2 diabetes mellitus with diabetic neuropathy, unspecified     Unspecified atrial fibrillation         Past Surgical History   Past Surgical History:   Procedure Laterality Date    ABOVE KNEE AMPUTATION Bilateral 1/25/2022    Procedure: Bilateral above the knee amputation;  Surgeon: Herber Rodriguez MD;  Location: [facility] MAIN OR;  Service: Vascular;  Laterality: Bilateral;    ERCP N/A 11/21/2024    Procedure: ENDOSCOPIC RETROGRADE CHOLANGIOPANCREATOGRAPHY with sphincterotomy;  Surgeon: Angel Castaneda MD;  Location: [facility] ENDOSCOPY;  Service: Gastroenterology;  Laterality: N/A;  pre: bile duct obstruction  post: periampula diveritculum    ERCP N/A 11/24/2024    Procedure: ENDOSCOPIC RETROGRADE CHOLANGIOPANCREATOGRAPHY WITH SPHINCTEROTOMY, BRUSHINGS, AND STENT PLACEMENT;  Surgeon: Mikhail Malave MD;  Location: [facility] ENDOSCOPY;  Service: Gastroenterology;  Laterality: N/A;  PRE- BILE DUCT OBSTRUCTION  POST- DISTAL BILE DUCT STRICTURE       Social History  Social History     Socioeconomic History    Marital status: Single   Tobacco Use    Smoking status: Never     Passive exposure: Never    Smokeless tobacco: Never   Vaping Use    Vaping status: Never Used   Substance and Sexual Activity    Alcohol use: Never    Drug use: Never    Sexual activity: Defer       Family History  History reviewed. No pertinent family history.    Colorectal cancer family history: None    Review of Systems  Negative except as documented in the HPI.      Allergies  No Known Allergies    Medications    Current Facility-Administered Medications:     [Held by provider] apixaban (ELIQUIS) tablet 5 mg, 5 mg, Oral, Q12H, Deejay Mann MD, 5 mg at 11/27/24 0856    aspirin EC tablet 81 mg, 81 mg, Oral, Daily, Mikhail Malave MD, 81 mg at 11/27/24 0856    atorvastatin (LIPITOR) tablet 10 mg, 10 mg, Oral, Daily, Mikhail Malave MD, 10 mg at 11/27/24 0856    baclofen (LIORESAL) tablet 10 mg, 10 mg, Oral, BID, Mikhail Malave MD, 10 mg at 11/27/24 0856    buPROPion XL (WELLBUTRIN XL) 24 hr tablet 150 mg, 150 mg, Oral, Daily, Mikhail Malave MD, 150 mg at 11/27/24 0856    busPIRone (BUSPAR) tablet 15 mg, 15 mg, Oral, TID, Mikhail Malave MD, 15 mg at 11/27/24 0856    cetirizine (zyrTEC) tablet 10 mg, 10 mg, Oral, Daily, Deejay Mann MD, 10 mg at 11/27/24 0856    cholestyramine light packet 4 g, 1 packet, Oral, Daily, Mikhail Malave MD, 4 g at 11/26/24 0949    cilostazol (PLETAL) tablet 100 mg, 100 mg, Oral, BID, Mikhail Malave MD, 100 mg at 11/27/24 0857    dextrose (D50W) (25 g/50 mL) IV injection 25 g, 25 g, Intravenous, Q15 Min PRN, Mikhail Malave MD    dextrose (GLUTOSE) oral gel 15 g, 15 g, Oral, Q15 Min PRN, Mikhail Malave MD    digoxin (LANOXIN) tablet 125 mcg, 125 mcg, Oral, Daily, Mikhail Malave MD, 125 mcg at 11/27/24 1204    ferrous sulfate tablet 325 mg, 325 mg, Oral, Daily With Breakfast, Mikhail Malave MD, 325 mg at 11/27/24 0856    glucagon (GLUCAGEN) injection 1 mg, 1 mg, Intramuscular, Q15 Min PRN, Mikhail Malave MD    HYDROcodone-acetaminophen (NORCO) 5-325 MG per tablet 1 tablet, 1 tablet, Oral, Q6H PRN, Deejay Mann MD, 1 tablet at 11/27/24 1205    insulin glargine (LANTUS, SEMGLEE) injection 60 Units, 60 Units, Subcutaneous, Nightly, Deejay Mann MD    insulin lispro (HUMALOG/ADMELOG) injection 2-7 Units, 2-7 Units, Subcutaneous, 4x Daily AC & at Bedtime, Mikhail Malave MD, 6 Units at 11/27/24 1204    levETIRAcetam (KEPPRA) tablet 500 mg, 500 mg,  Oral, BID, Mikhail Malave MD, 500 mg at 11/27/24 0856    milnacipran (SAVELLA) tablet 50 mg, 50 mg, Oral, Q12H, Mikhail Malave MD, 50 mg at 11/27/24 0857    multivitamin with minerals 1 tablet, 1 tablet, Oral, Daily, Mikhail Malave MD, 1 tablet at 11/27/24 0855    ondansetron ODT (ZOFRAN-ODT) disintegrating tablet 4 mg, 4 mg, Oral, Q6H PRN **OR** ondansetron (ZOFRAN) injection 4 mg, 4 mg, Intravenous, Q6H PRN, Mikhail Malave MD    Phosphorus Replacement - Follow Nurse / BPA Driven Protocol, , Not Applicable, PRN, Deejay Mann MD    polyethylene glycol (MIRALAX) packet 17 g, 17 g, Oral, Daily, Deejay Mann MD, 17 g at 11/27/24 0920    Potassium Replacement - Follow Nurse / BPA Driven Protocol, , Not Applicable, PRN, Mikhail Malave MD    prazosin (MINIPRESS) capsule 1 mg, 1 mg, Oral, Nightly, Jeramy Briggs MD, 1 mg at 11/25/24 2018    QUEtiapine (SEROquel) tablet 50 mg, 50 mg, Oral, Nightly, Mikhail Malave MD, 50 mg at 11/26/24 2005    sennosides-docusate (PERICOLACE) 8.6-50 MG per tablet 2 tablet, 2 tablet, Oral, BID, Deejay Mann MD, 2 tablet at 11/27/24 0920    sodium chloride 0.9 % flush 10 mL, 10 mL, Intravenous, Q12H, Mikhail Malave MD, 10 mL at 11/26/24 2007    sodium chloride 0.9 % flush 10 mL, 10 mL, Intravenous, PRN, Mikhail Malave MD    sodium chloride 0.9 % infusion 40 mL, 40 mL, Intravenous, PRN, Mikhail Malave MD    tiZANidine (ZANAFLEX) tablet 2 mg, 2 mg, Oral, Q8H PRN, Mikhail Malave MD, 2 mg at 11/27/24 1518    traMADol (ULTRAM) tablet 50 mg, 50 mg, Oral, Q8H PRN, Mikhail Malave MD, 50 mg at 11/27/24 0920    traZODone (DESYREL) tablet 50 mg, 50 mg, Oral, Nightly, Mikhail Malave MD, 50 mg at 11/26/24 2005    Vital Signs  Vitals:    11/27/24 1207   BP:    Pulse: 88   Resp:    Temp:    SpO2:           Physical Exam  Constitutional: Resting comfortably, no acute distress  Neck: Supple, trachea midline  Respiratory: No increased work of breathing, Symmetric excursion  Cardiovascular: Well  pefursed, no jugular venous distention evident   Abdominal:  Soft, non-tender, non-distended  Lymphatics: No cervical or suprascapular adenopathy  Skin: Warm, dry, no rash on visualized skin surfaces  Psychiatric: Alert and oriented ×3, normal affect     Laboratory Results  I have personally reviewed CBC with WBC 5, hemoglobin 10.8, platelets 140.  CMP with creatinine 0.71, albumin 2.5 , ALT 33, total bilirubin 1.0.    Radiology  I have personally reviewed CT scan of the chest demonstrates patent right internal jugular vein.           César Lacy MD  Colorectal & General Surgery  [facility]    [address], [address]  [place], [place], [address]  P: [phone]  F: [phone]       Electronically signed by Shade Lacy MD at 11/27/24 1613       Fay Ramon MD at 11/27/24 0898        Consult Orders    1. Hematology & Oncology Inpatient Consult [[id number]] ordered by Deejay Mann MD at 11/27/24 0845                 [facility] INITIAL INPATIENT CONSULTATION NOTE    REASON FOR CONSULTATION:    Adenocarcinoma       HISTORY OF PRESENT ILLNESS:  Chantal Kumar is a 74 y.o. female who we are asked to see today in consultation for newly diagnosed biliary adenocarcinoma.    Past medical history of CHF, COPD, diabetes type 2, hypertension, hyperlipidemia, bilateral above-knee amputations.  Presented with abdominal discomfort.    Labs upon admission notable for total bilirubin 5.2.  GI was consulted,    An ERCP was done on 11/21/2024 by Dr. Angel Castaneda.  Limited view of the esophagus stomach and small bowel normal.  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  Consultation to interventional radiology for PTC recommended.  A CT abdomen obtained on 11/22/2024 showed a 2.9 cm hypoenhancing pancreatic  head mass concerning for pancreatic adenocarcinoma.  Mass encases and severely narrows main portal vein at the portal venous confluence.  Mass causes moderate intra and extrahepatic biliary ductal dilatation.  No abdominal lymphadenopathy.  No abdominal omental or peritoneal nodularity.  On 11/24/2024 she underwent ERCP with sphincterotomy, brushing and stent placement by Dr. Malave.  IR consult was then canceled.  Brushings of the CBD revealed atypical ductal cells Suspicious for well-differentiated adenocarcinoma    Patient reports she lives in a nursing home.  She lives about 60 miles from [place].      Past Medical History:   Diagnosis Date    Arthritis     Bipolar disorder, unspecified     Borderline personality disorder     CHF (congestive heart failure)     Chronic pain     COPD (chronic obstructive pulmonary disease)     Depression     Diabetes mellitus     Dysphagia     Generalized anxiety disorder     GERD (gastroesophageal reflux disease)     Hyperlipidemia     Hypertension     Migraine     Nightmare disorder     Osteoporosis     Pressure ulcer of unspecified site, stage 4     Seizures     Stroke     Type 2 diabetes mellitus with diabetic neuropathy, unspecified     Unspecified atrial fibrillation        Past Surgical History:   Procedure Laterality Date    ABOVE KNEE AMPUTATION Bilateral 1/25/2022    Procedure: Bilateral above the knee amputation;  Surgeon: Herber Rodriguez MD;  Location: [facility];  Service: Vascular;  Laterality: Bilateral;    ERCP N/A 11/21/2024    Procedure: ENDOSCOPIC RETROGRADE CHOLANGIOPANCREATOGRAPHY with sphincterotomy;  Surgeon: Angel Castaneda MD;  Location: [facility] ENDOSCOPY;  Service: Gastroenterology;  Laterality: N/A;  pre: bile duct obstruction  post: periampula diveritculum    ERCP N/A 11/24/2024    Procedure: ENDOSCOPIC RETROGRADE CHOLANGIOPANCREATOGRAPHY WITH SPHINCTEROTOMY, BRUSHINGS, AND STENT PLACEMENT;  Surgeon: Mikhail Malave MD;  Location: [facility]  ENDOSCOPY;  Service: Gastroenterology;  Laterality: N/A;  PRE- BILE DUCT OBSTRUCTION  POST- DISTAL BILE DUCT STRICTURE       SOCIAL HISTORY:   reports that she has never smoked. She has never been exposed to tobacco smoke. She has never used smokeless tobacco. She reports that she does not drink alcohol and does not use drugs.    FAMILY HISTORY:  family history is not on file.    ALLERGIES:  No Known Allergies    MEDICATIONS:  As listed in the electronic medical record.    Review of Systems   Constitutional:  Positive for fatigue.   Gastrointestinal:  Positive for abdominal pain (Reports resolved now) and diarrhea (Reports she had diarrhea for about 3 weeks prior to admission, resolved now).   Genitourinary: Negative.    Skin:  Positive for wound (Pain at sacral decub).   Hematological: Negative.        Vitals:    11/26/24 2258 11/27/24 0622 11/27/24 0913 11/27/24 1207   BP: 99/63 107/65 117/63    BP Location:  Right arm Right arm    Patient Position:  Lying Lying    Pulse: 80 88 77 88   Resp:  16 16    Temp:  98.3 °F (36.8 °C) 97.6 °F (36.4 °C)    TempSrc:  Oral Oral    SpO2:  93% 94%    Weight:           Physical Exam  Constitutional:       General: She is not in acute distress.     Appearance: She is obese.   HENT:      Head: Normocephalic and atraumatic.   Cardiovascular:      Rate and Rhythm: Regular rhythm.      Heart sounds: No murmur heard.  Pulmonary:      Effort: No respiratory distress.      Breath sounds: Normal breath sounds.   Musculoskeletal:      Comments: S/p b/l AKA   Neurological:      General: No focal deficit present.      Mental Status: She is alert and oriented to person, place, and time.         DIAGNOSTIC DATA:    Results from last 7 days   Lab Units 11/27/24  0624 11/26/24  0458 11/25/24  0518   WBC 10*3/mm3 5.28 5.05 4.49   HEMOGLOBIN g/dL 10.8* 10.3* 9.9*   HEMATOCRIT % 34.0 35.3 33.9*   PLATELETS 10*3/mm3 140 137* 148      Results from last 7 days   Lab Units 11/27/24 0624  11/26/24  0458 11/24/24  0628   SODIUM mmol/L 132* 136 138   POTASSIUM mmol/L 4.2 3.7 3.9   CHLORIDE mmol/L 103 102 108*   CO2 mmol/L 22.0 24.2 21.0*   BUN mg/dL 10 8 9   CREATININE mg/dL 0.71 0.74 0.58   CALCIUM mg/dL 8.3* 8.1* 8.3*   BILIRUBIN mg/dL 1.0 1.2 1.4*   ALK PHOS U/L 354* 390* 571*   ALT (SGPT) U/L 33 37* 59*   AST (SGOT) U/L 14 18 26   GLUCOSE mg/dL 338* 347* 202*          IMAGING:    CT Abdomen With & Without Contrast (11/22/2024 19:43)   CT Chest With Contrast Diagnostic (11/27/2024 13:00)     Assessment & Plan   ASSESSMENT:  This is a 74 y.o. female with:    *Adenocarcinoma, likely pancreatic  *Multiple bilateral pulmonary nodules, subcentimeter  *Poor performance status, ECOG 3-most likely secondary to bilateral AKA and other medical comorbidities  Presented with abdominal discomfort, found to have total bilirubin elevated at 5.2  11/21/2022 ERCP with unsuccessful cannulization.  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  11/21/2024 Ca 19-9 - 904   11/22/2024-CT abdomen-a 2.9 cm hypoenhancing pancreatic head mass concerning for pancreatic adenocarcinoma.  Mass encases and severely narrows main portal vein at the portal venous confluence.  Mass causes moderate intra and extrahepatic biliary ductal dilatation.  No abdominal lymphadenopathy.  No abdominal omental or peritoneal nodularity.   11/24/2024 ERCP with sphincterotomy, brushing and stent placement by Dr. Malave.  IR consult was then canceled.    11/24/2024 brushings of the CBD revealed atypical ductal cells Suspicious for well-differentiated adenocarcinoma  11/27/2024 CT chest-multiple right greater than left bilateral solid subcentimeter pulmonary nodules measuring up to 8 mm.  Pulmonary metastatic disease cannot be excluded.  11/27/2024-I have reviewed the imaging, pathology and discussed the diagnosis and management with the patient.  She has a 2.9 cm hypoenhancing pancreatic head mass, elevated CA 19-9, and CBD brushings  suspicious for well-differentiated adenocarcinoma.  She likely has pancreatic adenocarcinoma.  I have discussed next steps in management-obtaining a PET scan as an outpatient to complete staging; and referral to surgical oncology at [facility] for surgical management opinion.  Unfortunately, patient is a nursing home resident.  She does have poor performance status mainly due to her medical comorbidities and bilateral AKA.  She seems to be a poor surgical candidate due to her medical comorbidities.  Would likely consider palliative chemo with Gemzar dose reduced to 800 mg/m² day 1 and day 15 of a 28-day cycle for better tolerance.  Reviewed possible side effects of chemotherapy including but not limited to cytopenia, febrile neutropenia, nausea, vomiting, hair loss.    *Normocytic anemia, chronic  Upon lab review she has had anemia since at least January 2022 with hemoglobin around 10.  11/27/2024 hemoglobin 10.8.  Will obtain lab eval    *Chronic osteomyelitis with chronic stage IV sacral decub ulcer  No antibiotics recommended per infectious disease.    *Other medical comorbidities-diabetes type 2, hypertension, hyperlipidemia, bilateral above-knee amputations, A-fib on Eliquis    RECOMMENDATIONS/PLAN:   Reviewed diagnosis and management with the patient in detail  Obtain Caris testing with GPSai on the CBD brushing  Obtain lab testing for anemia  Will consider genetic testing/counseling as outpatient  Given her multiple medical comorbidities, and for performance status, likely not surgical candidate.   Extensively discussed with the patient about my concern regarding her tolerance of chemotherapy.  Could consider Gemzar at a reduced dose of 800 mg/m² on day 1 and day 15 of a 28-day cycle for better tolerance.  Reviewed possible side effects of chemotherapy.  Patient willing to try chemotherapy.  She would need port for chemo.  Referral to general surgery placed  She will be given chemo education appointment; and  appointment with me on cycle 1 day 1 Gemzar  Primary team updated.  Case also discussed with my colleague, Dr. Chapo Ramon MD      Electronically signed by Fay Ramon MD at 24 1421          Discharge Summary        Luis Miguel MonyMASOUD Martínez at 24 1615       Attestation signed by Adrian Louis MD at 24 1322    I have reviewed this documentation and agree.                      Patient Name: Chantal Kumar  : 1950  MRN: [id number]    Date of Admission: 2024  Date of Discharge:  2024  Primary Care Physician: Sasha Bui MD      Chief Complaint:   No chief complaint on file.      Discharge Diagnoses     Active Hospital Problems    Diagnosis  POA    Chronic heart failure with preserved ejection fraction (HFpEF) [I50.32]  Yes    Pancreatic adenocarcinoma [C25.9]  Yes    Malignant neoplasm of other parts of pancreas [C25.7]  Unknown    Persistent atrial fibrillation [I48.19]  Yes    S/P AKA (above knee amputation) bilateral [Z89.611, Z89.612]  Not Applicable    Chronic diastolic CHF (congestive heart failure) [I50.32]  Yes    History of CVA (cerebrovascular accident) [Z86.73]  Not Applicable    Bipolar disorder, unspecified [F31.9]  Yes    Secondary hypertension [I15.9]  Yes    Type 2 diabetes mellitus with diabetic neuropathy, unspecified [E11.40]  Yes      Resolved Hospital Problems    Diagnosis Date Resolved POA    **Bile duct obstruction [K83.1] 2024 Yes        Hospital Course     Ms. Kumar is a 74 y.o. female with a history of diabetes, anxiety, COPD, depression, diabetes mellitus type 2, GERD, hypertension, seizure disorder, prior CVA, paroxysmal A-fib, osteoporosis, borderline personality disorder, bipolar disorder, pressure ulcer stage IV, and COPD who presented to [facility] with abdominal discomfort.  She was evaluated the emergency room and CT scan showed dilated intra and extrahepatic biliary tree and a distal common bile duct  obstruction from a pancreatic mass.   CT of the abdomen obtained on 11/22/2024 showed a 2.9 cm hypoenhancing pancreatic head mass concerning for pancreatic adenocarcinoma.  The mass encases and severely narrows main portal vein at the portal venous confluence, mass causes moderate intra and extrahepatic biliary ductal dilation.  No abdominal omental or peritoneal nodularity was noted.  GI was consulted and on 11/21/2024 she underwent ERCP with a sphincterotomy with unsuccessful cannulation of the common bile duct brushing and stent placement by Dr. Malave.  She subsequently underwent repeat ERCP with sphincterotomy, brushings and stent placement on 11/24/24.  The brushings of the common bile duct revealed atypical ductal cells suspicious for well differentiated adenocarcinoma.  CT of the chest was obtained which showed multiple right greater than left bilateral solid subcentimeter pulmonary nodules measuring up to 8 mm concerning for pulmonary metastatic disease.      After oncologist's discussion with patient initial plan was to pursue further workup with PET scan to assess staging and then referral to surgical oncology at [facility] for surgical management opinion.  Because of the patient's comorbidities and poor functional status due to bilateral AKA it was felt that she would likely be a poor surgical candidate due to her medical comorbidities.  The discussion then shifted to palliative chemotherapy with Gemzar.  Side effects of chemotherapy were discussed with patient as well as her niece.  Niece had significant concerns about the patient's ability to tolerate chemotherapy given her poor functional status and comorbidities and was concerned about patient being immunocompromised.  Patient has chronic osteomyelitis with chronic stage IV sacral decubitus ulcer and niece was concerned about risk for infection if the patient were to undergo chemotherapy and become immunocompromise.  Ultimately today the patient and  niece had a goals of care discussion with oncologist who reviewed imaging and management strategies and patient opted to return to her long-term care facility with hospice support.  During the course of her stay she did have a episode of A-fib with RVR and cardiology was consulted.  They recommended restarting DOAC once cleared by other consultants and continue her metoprolol tartrate 25 mg twice daily.  However metoprolol was held given low blood pressures in the upper 90s to 100s systolic.  She remains on digoxin.    Liver enzymes continue to trend down as well as her bilirubin over the course of her stay.  Infectious disease was consulted regarding chronic sacral decubitus ulcer with osteomyelitis.  ID recommended holding antibiotics.    Pain has been adequately controlled with tramadol.  Blood sugars have been somewhat elevated now that appetite has returned.  Her basal insulin was adjusted to 70 units.  At discharge she will be adjusted back down to 60 units basal with her usual sliding scale from nursing home.  She will resume her Jardiance that she was taking prior to admit to the hospital.  Labs on the day of discharge revealed potassium 3.4 for which she has been replaced x 2.  Liver function test AST and ALT.ALT Dave phosphatase is elevated to 41 but overall shows trend down.  CBC on the day of discharge shows a normal white cell count at 4.33.  Hemoglobin is 10.8 which has been stable over the course of the last 6 days.  Platelet count is normal at 149,000.  She will be discharged on her same medication regimen except for the addition of Zofran for any nausea she might experience.  Her QTc was noted to be normal during her stay here.  Hospice consult was placed today at patient and family's request.  Hospice did meet with the patient at bedside in the knee's via phone and provided an explanation of service.  Plan is to return to long-term care facility with hospice support.  Patient verbalized her desire  for DNR.  Does not want to pursue chemotherapy with her goal being comfort and quality of life.  Hospice will follow-up [facility].    She is stable from a medical standpoint to discharge to long-term care with hospice support.  [facility] has made arrangements for her medical transport later this evening to her nursing and rehab facility.          Day of Discharge     Subjective:  Appetite fair. Reports increased right sided Upper quad pain at times. RN reports she is getting good rest following tramadol dosing. Denies n/v, chest pain/ pressure, shoa.     Physical Exam:  Temp:  [97.3 °F (36.3 °C)-97.6 °F (36.4 °C)] 97.4 °F (36.3 °C)  Heart Rate:  [66-88] 88  Resp:  [16] 16  BP: ()/(56-67) 96/67  Body mass index is 77.79 kg/m².      Physical Exam  Vitals and nursing note reviewed.   Constitutional:       General: She is not in acute distress.     Appearance: She is obese. She is not ill-appearing.      Comments: Sitting up in bed eating lunch.    HENT:      Head: Normocephalic.      Nose: Nose normal.      Mouth/Throat:      Mouth: Mucous membranes are moist.      Pharynx: Oropharynx is clear.   Eyes:      General: No scleral icterus.        Right eye: No discharge.         Left eye: No discharge.      Conjunctiva/sclera: Conjunctivae normal.   Cardiovascular:      Rate and Rhythm: Normal rate and regular rhythm.   Pulmonary:      Effort: Pulmonary effort is normal. No respiratory distress.      Breath sounds: Normal breath sounds. No wheezing or rales.      Comments: Poor inspiratory effort.   Abdominal:      General: Bowel sounds are normal. There is no distension.      Palpations: Abdomen is soft.      Tenderness: There is no abdominal tenderness.   Musculoskeletal:      Cervical back: Neck supple.      Right lower leg: No edema.      Left lower leg: No edema.      Comments: S/p bilateral AKA   Skin:     General: Skin is warm and dry.      Capillary Refill: Capillary refill takes less than 2  seconds.      Coloration: Skin is not jaundiced.   Neurological:      General: No focal deficit present.      Mental Status: She is alert and oriented to person, place, and time. Mental status is at baseline.      Cranial Nerves: No cranial nerve deficit.   Psychiatric:         Behavior: Behavior normal.         Thought Content: Thought content normal.      Comments: Flat affect        Consultants     Consult Orders (all) (From admission, onward)       Start     Ordered    11/30/24 1447  Inpatient Case Management  Consult  Once        Provider:  (Not yet assigned)    11/30/24 1447    11/30/24 0919  Inpatient Hospice / [facility] Consult  Once        Specialty:  Hospice and Palliative Medicine  Provider:  (Not yet assigned)    11/30/24 0918    11/29/24 1301  Inpatient Palliative Care Team Consult  Once        Provider:  (Not yet assigned)    11/29/24 1301    11/27/24 1420  Inpatient General Surgery Consult For Implanted Port Placement  Once        Specialty:  General Surgery  Provider:  Shade Lacy MD    11/27/24 1419    11/27/24 0846  Hematology & Oncology Inpatient Consult  Once        Specialty:  Hematology and Oncology  Provider:  Fay Ramon MD    11/27/24 0845    11/23/24 1051  Inpatient Cardiology Consult  Once        Specialty:  Cardiology  Provider:  Humphrey Giordano MD    11/23/24 1051    11/21/24 1146  Inpatient Advance Care Planning Consult  Once        Provider:  (Not yet assigned)    11/21/24 1145    11/20/24 1800  Inpatient Infectious Diseases Consult  Once        Specialty:  Infectious Diseases  Provider:  Connor Mcginnis MD    11/20/24 1759    11/20/24 1215  Inpatient Gastroenterology Consult  Once        Specialty:  Gastroenterology  Provider:  Angel Castaneda MD    11/20/24 1214                  Procedures     ENDOSCOPIC RETROGRADE CHOLANGIOPANCREATOGRAPHY WITH SPHINCTEROTOMY, BRUSHINGS, AND STENT PLACEMENT    Imaging Results (All)       Procedure Component  Value Units Date/Time    CT Chest With Contrast Diagnostic [[id number]] Collected: 11/27/24 1307     Updated: 11/27/24 1326    Narrative:      CT CHEST WITH IV CONTRAST     HISTORY: Recently diagnosed presumed pancreatic adenocarcinoma. Staging.     TECHNIQUE: Radiation dose reduction techniques were utilized, including  automated exposure control and exposure modulation based on body size.   3 mm images were obtained through the chest after the administration of  IV contrast.     COMPARISON: CT abdomen pelvis 11/22/2024        FINDINGS: Partially visualized internal biliary stent. Resolution of  prior biliary dilation with pneumobilia now present. Pancreatic head  mass best characterized on recent CT. Splenomegaly (15 cm). Heart is  normal in size. Severe coronary artery calcifications. Main pulmonary  artery at the upper limits of normal (29 mm). Nondilated thoracic aorta.  No mediastinal/hilar lymphadenopathy. Patulous esophagus.     Left hemidiaphragm elevation. Trace bilateral pleural effusions.  Multisegmental left greater than right bilateral lower lobe relaxation  atelectasis. Trachea and remaining main bronchi are patent.      Multiple right greater than left bilateral solid pulmonary nodules.     Reference nodules:  *  8 mm right upper lobe (series 3/image 47)  *  6 mm right upper lobe (3/24)  *  Sub-6 mm lingular (3/40)  *  Sub-6 mm right lower lobe (3/47)  *  Sub-6 mm right lower lobe (3/48)  *  Sub-6 mm left upper lobe (3/18             Impression:      Multiple right greater than left bilateral solid  subcentimeter pulmonary nodules measuring up to 8 mm will need continued  follow-up as pulmonary metastatic disease cannot be excluded. Outside  prior imaging of the chest, if available, would be helpful to  demonstrate nodule stability.     This report was finalized on 11/27/2024 1:22 PM by Dr. Grady Sandoval M.D on Workstation: [id number]       FL ercp biliary duct only [[id number]] Collected: 11/24/24  1522     Updated: 11/25/24 2200    Narrative:      ERCP INTERPRETATION ONLY 11/24/2024     HISTORY: ERCP.     There is contrast in the intrahepatic, common hepatic, and common bile  ducts. There is moderate biliary dilatation. There is a segment of the  common duct which is incompletely distended and narrowed for a length of  10 mm to 15 mm. A nonexpandable biliary stent is seen coursing into the  common duct.        FLUOROSCOPY TIME:  2 minutes, 26 seconds  ,  4   images, 2631 dose area  product.     This report was finalized on 11/25/2024 9:57 PM by Dr. Samy Zavala M.D on Workstation: [id number]       CT Abdomen With & Without Contrast [[id number]] Collected: 11/22/24 2001     Updated: 11/22/24 2020    Narrative:      CT ABDOMEN W WO CONTRAST-     HISTORY: Biliary obstruction from pancreatic mass.     TECHNIQUE: Radiation dose reduction techniques were utilized, including  automated exposure control and exposure modulation based on body size.   3 mm images were obtained through the abdomen after the administration  of IV contrast.     COMPARISON: None available        FINDINGS: Chronic appearing L5, L3 and L1 compression fractures with  approximately 50%, 25% and 50% vertebral body height loss respectively.  Left hemidiaphragm elevation. Spleen and colonic splenic flexure are  partially outside the exam field-of-view. A 1.4 cm right renal lesion  shows no enhancement suggesting a hemorrhagic/proteinaceous cyst. Few  subcentimeter hypodense renal lesions too small to characterize. A 2.9 x  2.9 cm hypoenhancing pancreatic head mass (series 6/image 43). Resulting  more distal main pancreatic duct dilation measuring up to 1.4 cm (series  6/image 38) and distal pancreas parenchymal atrophy. Mass causes  moderate intra and extrahepatic biliary duct dilation (series 6/images  23 and 38). Post cholecystectomy. Mass encases and severely narrows the  main portal vein at the portal venous confluence (series 8/image  50). No  vascular interface with the celiac artery or SMA. Noncalcified  atherosclerotic plaque in the proximal SMA. More distal SMA is patent.  No abdominal lymphadenopathy. No focal hepatic lesion. No abdominal  omental or peritoneal nodularity. Remaining solid organs and visualized  bowel are normal.          Impression:      1. A 2.9 cm hypoenhancing pancreatic head mass is concerning for  pancreatic adenocarcinoma. Recommend EUS/FNA. Mass encases and severely  narrows the main portal vein at the portal venous confluence. Mass  causes moderate intra and extrahepatic bili duct dilation.  2. No abdominal lymphadenopathy. No focal hepatic lesion. No abdominal  omental or peritoneal nodularity.     This report was finalized on 11/22/2024 8:17 PM by Dr. Grady Sandoval M.D on Workstation: [id number]       FL ercp biliary duct only [[id number]] Collected: 11/21/24 1854     Updated: 11/21/24 1905    Narrative:      ERCP INTERPRETATION ONLY 11/21/2024     HISTORY: Attempted ERCP.     FINDINGS: Single limited field-of-view image of the abdomen is  submitted. Endoscope is seen with a small focus of irregular increased  density probably some injected contrast. No contrast is seen within the  biliary system on this image.     Fluoroscopy time 4 minutes 9 seconds 1 image 4104 dose area product                 This report was finalized on 11/21/2024 7:02 PM by Dr. Samy Zavala M.D on Workstation: [id number]               Results for orders placed during the hospital encounter of 01/17/22    Adult Transthoracic Echo Complete W/ Cont if Necessary Per Protocol    Interpretation Summary  · Estimated right ventricular systolic pressure from tricuspid regurgitation is normal (<35 mmHg). Calculated right ventricular systolic pressure from tricuspid regurgitation is 20 mmHg.  · Left ventricular wall thickness is consistent with mild concentric hypertrophy.  · Left ventricular ejection fraction appears to be 51 - 55%.  "Left ventricular systolic function is low normal.  · Left ventricular diastolic function was indeterminate.  · Technically difficult study due to patient positioning, limited acoustic windows    Pertinent Labs     Results from last 7 days   Lab Units 11/30/24  0453 11/29/24  0806 11/28/24  0614 11/27/24  0624   WBC 10*3/mm3 4.33 4.57 4.12 5.28   HEMOGLOBIN g/dL 10.8* 10.8* 10.7* 10.8*   PLATELETS 10*3/mm3 149 138* 155 140     Results from last 7 days   Lab Units 11/30/24  0453 11/29/24  0806 11/28/24  0614 11/27/24  0624   SODIUM mmol/L 141 137 135* 132*   POTASSIUM mmol/L 3.4* 3.8 3.9 4.2   CHLORIDE mmol/L 105 105 104 103   CO2 mmol/L 26.4 26.0 26.0 22.0   BUN mg/dL 16 15 13 10   CREATININE mg/dL 0.67 0.69 0.70 0.71   GLUCOSE mg/dL 240* 260* 263* 338*   EGFR mL/min/1.73 91.8 91.2 90.9 89.4     Results from last 7 days   Lab Units 11/30/24  0453 11/29/24  0806 11/28/24  0614 11/27/24  0624   ALBUMIN g/dL 2.6* 2.7* 2.6* 2.5*   BILIRUBIN mg/dL 0.8 0.9 0.9 1.0   ALK PHOS U/L 241* 272* 301* 354*   AST (SGOT) U/L 15 15 17 14   ALT (SGPT) U/L 22 26 28 33     Results from last 7 days   Lab Units 11/30/24  0453 11/29/24  0806 11/28/24  0614 11/27/24  0624   CALCIUM mg/dL 8.6 8.8 8.7 8.3*   ALBUMIN g/dL 2.6* 2.7* 2.6* 2.5*   MAGNESIUM mg/dL 1.9 2.1 2.1 2.4   PHOSPHORUS mg/dL 3.5 3.0 2.7 2.6       Results from last 7 days   Lab Units 11/24/24  1543   DIGOXIN LVL ng/mL 0.50*           Invalid input(s): \"LDLCALC\"          Test Results Pending at Discharge     Pending Results       Procedure [Order ID] Specimen - Date/Time    Potassium [[id number]] Collected: 11/30/24 9832    Specimen: Blood               Discharge Details        Discharge Medications        New Medications        Instructions Start Date   cetirizine 10 MG tablet  Commonly known as: zyrTEC   5 mg, Oral, Daily      ondansetron ODT 4 MG disintegrating tablet  Commonly known as: ZOFRAN-ODT   4 mg, Oral, Every 8 Hours PRN      sennosides-docusate 8.6-50 MG per " tablet  Commonly known as: PERICOLACE   2 tablets, Oral, 2 Times Daily             Changes to Medications        Instructions Start Date   atorvastatin 10 MG tablet  Commonly known as: LIPITOR  What changed: Another medication with the same name was removed. Continue taking this medication, and follow the directions you see here.   Take  by mouth.      tiZANidine 2 MG tablet  Commonly known as: ZANAFLEX  What changed:   when to take this  reasons to take this   2 mg, Oral, Every 8 Hours PRN             Continue These Medications        Instructions Start Date   apixaban 5 MG tablet tablet  Commonly known as: ELIQUIS   5 mg, Oral, Every 12 Hours Scheduled      ascorbic acid 500 MG tablet  Commonly known as: VITAMIN C   500 mg, Daily      aspirin 81 MG EC tablet   81 mg, Daily      baclofen 10 MG tablet  Commonly known as: LIORESAL   10 mg, 2 Times Daily      buPROPion  MG 24 hr tablet  Commonly known as: WELLBUTRIN XL   150 mg, Daily      busPIRone 15 MG tablet  Commonly known as: BUSPAR   15 mg, 3 Times Daily      cilostazol 100 MG tablet  Commonly known as: PLETAL   100 mg, 2 Times Daily      colestipol 1 g tablet  Commonly known as: COLESTID   1 g, 2 Times Daily      digoxin 125 MCG tablet  Commonly known as: LANOXIN   125 mcg, Daily Digoxin      Ferrous Fumarate 324 (106 Fe) MG tablet   324 mg, Daily      furosemide 20 MG tablet  Commonly known as: LASIX   20 mg      insulin glargine 100 UNIT/ML injection  Commonly known as: LANTUS, SEMGLEE   60 Units, Subcutaneous, Nightly      insulin lispro 100 UNIT/ML injection  Commonly known as: humaLOG   3 Times Daily Before Meals      Jardiance 10 MG tablet tablet  Generic drug: empagliflozin   10 mg, Daily      l-methylfolate calcium 7.5 MG tablet tablet  Commonly known as: DEPLIN   7.5 mg, Daily      levETIRAcetam 500 MG tablet  Commonly known as: KEPPRA   500 mg, 2 Times Daily      metoprolol tartrate 100 MG tablet  Commonly known as: LOPRESSOR   100 mg, 2 Times  Daily      milnacipran 50 MG tablet tablet  Commonly known as: SAVELLA   50 mg, Every 12 Hours Scheduled      multivitamin with minerals tablet tablet   1 tablet, Daily      potassium chloride 10 MEQ CR tablet  Commonly known as: KLOR-CON M10   20 mEq, 2 Times Daily      prazosin 2 MG capsule  Commonly known as: MINIPRESS   2 mg, Nightly      QUEtiapine 100 MG tablet  Commonly known as: SEROquel   100 mg, Oral, Nightly      traMADol 50 MG tablet  Commonly known as: ULTRAM   50 mg, Every 8 Hours PRN      traZODone 50 MG tablet  Commonly known as: DESYREL   50 mg, Oral               No Known Allergies    Discharge Disposition:  Skilled Nursing Facility (DC - External)      Discharge Diet:  Diet Order   Procedures    Diet: Regular/House, Diabetic, Gastrointestinal; Consistent Carbohydrate; Fat-Restricted; Texture: Regular (IDDSI 7); Fluid Consistency: Thin (IDDSI 0)       Discharge Activity:   Activity Instructions       Gradually Increase Activity Until at Pre-Hospitalization Level              CODE STATUS:    Code Status and Medical Interventions: CPR (Attempt to Resuscitate); Full Support   Ordered at: 11/22/24 0652     Code Status (Patient has no pulse and is not breathing):    CPR (Attempt to Resuscitate)     Medical Interventions (Patient has pulse or is breathing):    Full Support       Future Appointments   Date Time Provider Department Center   12/12/2024  2:40 PM LAB CHAIR 5 [facility]  LAB [facility]   12/12/2024  3:00 PM Clara Quiñonez APRN K [facility]   12/19/2024  7:30 AM INFU CBC [facility] PORT CHAIR  INFUS [facility]   12/19/2024  8:00 AM Fay Ramon MD K [facility]   12/19/2024  8:45 AM CHAIR 20 [facility] - [facility] INFUS [facility]      Contact information for follow-up providers       Sasha Bui MD .    Specialty: Family Medicine  Contact information:  [address]  [place] [address] [phone]                       Contact information for after-discharge care       Destination        [facility] [place] .    Service: Nursing Home  Contact information:  [address] [facility] [address]  [phone]                                   Time Spent on Discharge:  Greater than 30 minutes      MASOUD Duckworth  [facility]  24  16:16 EST    Electronically signed by Adrian Louis MD at 24 1322       Mony Bolanos APRN at 24 1032       Attestation signed by Adrian Louis MD at 24 1323    I have reviewed this documentation and agree.                      Patient Name: Chantal Kumar  : 1950  MRN: [id number]    Date of Admission: 2024  Date of Discharge:  2024  Primary Care Physician: Sasha Biu MD      Chief Complaint:   No chief complaint on file.      Discharge Diagnoses     Active Hospital Problems    Diagnosis  POA    Chronic heart failure with preserved ejection fraction (HFpEF) [I50.32]  Yes    Pancreatic adenocarcinoma [C25.9]  Yes    Malignant neoplasm of other parts of pancreas [C25.7]  Unknown    Persistent atrial fibrillation [I48.19]  Yes    S/P AKA (above knee amputation) bilateral [Z89.611, Z89.612]  Not Applicable    Chronic diastolic CHF (congestive heart failure) [I50.32]  Yes    History of CVA (cerebrovascular accident) [Z86.73]  Not Applicable    Bipolar disorder, unspecified [F31.9]  Yes    Secondary hypertension [I15.9]  Yes    Type 2 diabetes mellitus with diabetic neuropathy, unspecified [E11.40]  Yes      Resolved Hospital Problems    Diagnosis Date Resolved POA    **Bile duct obstruction [K83.1] 2024 Yes        Hospital Course     Ms. Kumar is a 74 y.o. female with a history of diabetes, anxiety, COPD, depression, diabetes mellitus type 2, GERD, hypertension, seizure disorder, prior CVA, paroxysmal A-fib, osteoporosis, borderline personality disorder, bipolar disorder, pressure ulcer stage IV, and COPD who presented to [facility] with abdominal  discomfort.  She was evaluated the emergency room and CT scan showed dilated intra and extrahepatic biliary tree and a distal common bile duct obstruction from a pancreatic mass.   CT of the abdomen obtained on 11/22/2024 showed a 2.9 cm hypoenhancing pancreatic head mass concerning for pancreatic adenocarcinoma.  The mass encases and severely narrows main portal vein at the portal venous confluence, mass causes moderate intra and extrahepatic biliary ductal dilation.  No abdominal omental or peritoneal nodularity was noted.  GI was consulted and on 11/21/2024 she underwent ERCP with a sphincterotomy with unsuccessful cannulation of the common bile duct brushing and stent placement by Dr. Malave.  She subsequently underwent repeat ERCP with sphincterotomy, brushings and stent placement on 11/24/24.  The brushings of the common bile duct revealed atypical ductal cells suspicious for well differentiated adenocarcinoma.  CT of the chest was obtained which showed multiple right greater than left bilateral solid subcentimeter pulmonary nodules measuring up to 8 mm concerning for pulmonary metastatic disease.      After oncologist's discussion with patient initial plan was to pursue further workup with PET scan to assess staging and then referral to surgical oncology at [facility] for surgical management opinion.  Because of the patient's comorbidities and poor functional status due to bilateral AKA it was felt that she would likely be a poor surgical candidate due to her medical comorbidities.  The discussion then shifted to palliative chemotherapy with Gemzar.  Side effects of chemotherapy were discussed with patient as well as her niece.  Niece had significant concerns about the patient's ability to tolerate chemotherapy given her poor functional status and comorbidities and was concerned about patient being immunocompromised.  Patient has chronic osteomyelitis with chronic stage IV sacral decubitus ulcer and niece was  concerned about risk for infection if the patient were to undergo chemotherapy and become immunocompromise.  Ultimately today the patient and niece had a goals of care discussion with oncologist who reviewed imaging and management strategies and patient opted to return to her long-term care facility with hospice support.  During the course of her stay she did have a episode of A-fib with RVR and cardiology was consulted.  They recommended restarting DOAC once cleared by other consultants and continue her metoprolol tartrate 25 mg twice daily.  However metoprolol was held given low blood pressures in the upper 90s to 100s systolic.  She remains on digoxin.    Liver enzymes continue to trend down as well as her bilirubin over the course of her stay.  AST and ALT completely normalized by 11/27/2024.  Infectious disease was consulted regarding chronic sacral decubitus ulcer with osteomyelitis.  ID recommended holding antibiotics.    Pain has been adequately controlled with tramadol.  Blood sugars have been somewhat elevated now that appetite has returned.  Her basal insulin was adjusted to 70 units.  At discharge she will be adjusted back down to 60 units basal with her usual sliding scale from nursing home.  She will resume her Jardiance that she was taking prior to admit to the hospital.  Labs on the day of discharge revealed potassium 3.4 for which she has been replaced x 2.  Liver function test AST and ALT.ALT Dave phosphatase is elevated to 41 but overall shows trend down.  CBC on the day of discharge shows a normal white cell count at 4.33.  Hemoglobin is 10.8 which has been stable over the course of the last 6 days.  Platelet count is normal at 149,000.  She will be discharged on her same medication regimen except for the addition of Zofran for any nausea she might experience.  Her QTc was noted to be normal during her stay here.  Hospice consult was placed today at patient and family's request.  Hospice did  meet with the patient at bedside in the knee's via phone and provided an explanation of service.  Plan is to return to long-term care facility with hospice support.  Patient verbalized her desire for DNR.  Does not want to pursue chemotherapy with her goal being comfort and quality of life.  Hospice will follow-up [facility].      Discharge 11/30/2024 was canceled due to elevated blood sugars following a carb load with snack cake late evening.  Blood sugars got up to 500 her insulin sliding scale was adjusted.  Blood sugars have now trended down to more acceptable range at 259 this morning.  On discharge she will resume her long-term care sliding scale schedule and her Lantus 60 units.  She will resume back on her Jardiance.  Jardiance was held during her stay.  Today she is looking forward to going back to her long-term care facility where her niece works.  She understands that hospice will be following her there.  She again reiterated her desire for focus on quality of life and comfort moving forward and her CODE STATUS has been updated to DNR with comfort measures.    She is stable from a medical standpoint to discharge to long-term care this morning.  Transportation arrangements have been made for transport today around 1145 with oxygen.  She is on chronic oxygen at her long-term care facility and this will be resumed at discharge.      Day of Discharge     Subjective:    Some mild fatigue this morning.  Overall pain has been well-controlled.  She denies any new issues or new concerns.  She is looking forward to going back to her long-term care facility and seeing her niece and the other staff members who work there.  Denies any nausea, vomiting, chest pain, shortness of breath.    Physical Exam:  Temp:  [97.2 °F (36.2 °C)-98.4 °F (36.9 °C)] 97.6 °F (36.4 °C)  Heart Rate:  [63-85] 72  Resp:  [12-16] 12  BP: ()/(54-69) 91/55  Body mass index is 77.79 kg/m².      Physical Exam  Vitals and  nursing note reviewed.   Constitutional:       General: She is not in acute distress.     Appearance: She is obese. She is not ill-appearing.  Lying in bed.  Appears comfortable.       HENT:      Head: Normocephalic.      Nose: Nose normal.      Mouth/Throat:      Mouth: Mucous membranes are moist.      Pharynx: Oropharynx is clear.   Eyes:      General: No scleral icterus.        Right eye: No discharge.         Left eye: No discharge.      Conjunctiva/sclera: Conjunctivae normal.   Cardiovascular:      Rate and Rhythm: Normal rate and regular rhythm.   Pulmonary:      Effort: Pulmonary effort is normal. No respiratory distress.      Breath sounds: Normal breath sounds. No wheezing or rales.      Comments: Poor inspiratory effort.  Overall clear.  Nasal cannula 2 L  Abdominal:      General: Bowel sounds are normal. There is no distension.      Palpations: Abdomen is soft.      Tenderness: There is no abdominal tenderness.   Musculoskeletal:      Cervical back: Neck supple.      Right lower leg: No edema.      Left lower leg: No edema.      Comments: S/p bilateral AKA   Skin:     General: Skin is warm and dry.      Capillary Refill: Capillary refill takes less than 2 seconds.      Coloration: Skin is not jaundiced.   Neurological:      General: No focal deficit present.      Mental Status: She is alert and oriented to person, place, and time. Mental status is at baseline.      Cranial Nerves: No cranial nerve deficit.   Psychiatric:         Behavior: Behavior normal.         Thought Content: Thought content normal.      Comments: Flat affect        Consultants     Consult Orders (all) (From admission, onward)       Start     Ordered    11/30/24 1447  Inpatient Case Management  Consult  Once        Provider:  (Not yet assigned)    11/30/24 1447    11/30/24 0919  Inpatient Hospice / [facility] Consult  Once        Specialty:  Hospice and Palliative Medicine  Provider:  (Not yet assigned)    11/30/24 0999     11/29/24 1301  Inpatient Palliative Care Team Consult  Once        Provider:  (Not yet assigned)    11/29/24 1301    11/27/24 1420  Inpatient General Surgery Consult For Implanted Port Placement  Once        Specialty:  General Surgery  Provider:  Shade Lacy MD    11/27/24 1419    11/27/24 0846  Hematology & Oncology Inpatient Consult  Once        Specialty:  Hematology and Oncology  Provider:  Fay Ramon MD    11/27/24 0845    11/23/24 1051  Inpatient Cardiology Consult  Once        Specialty:  Cardiology  Provider:  Humphrey Giordano MD    11/23/24 1051    11/21/24 1146  Inpatient Advance Care Planning Consult  Once        Provider:  (Not yet assigned)    11/21/24 1145    11/20/24 1800  Inpatient Infectious Diseases Consult  Once        Specialty:  Infectious Diseases  Provider:  Connor Mcginnis MD    11/20/24 1759    11/20/24 1215  Inpatient Gastroenterology Consult  Once        Specialty:  Gastroenterology  Provider:  Angel Castaneda MD    11/20/24 1214                  Procedures     ENDOSCOPIC RETROGRADE CHOLANGIOPANCREATOGRAPHY WITH SPHINCTEROTOMY, BRUSHINGS, AND STENT PLACEMENT    Imaging Results (All)       Procedure Component Value Units Date/Time    CT Chest With Contrast Diagnostic [[id number]] Collected: 11/27/24 1307     Updated: 11/27/24 1326    Narrative:      CT CHEST WITH IV CONTRAST     HISTORY: Recently diagnosed presumed pancreatic adenocarcinoma. Staging.     TECHNIQUE: Radiation dose reduction techniques were utilized, including  automated exposure control and exposure modulation based on body size.   3 mm images were obtained through the chest after the administration of  IV contrast.     COMPARISON: CT abdomen pelvis 11/22/2024        FINDINGS: Partially visualized internal biliary stent. Resolution of  prior biliary dilation with pneumobilia now present. Pancreatic head  mass best characterized on recent CT. Splenomegaly (15 cm). Heart is  normal in size.  Severe coronary artery calcifications. Main pulmonary  artery at the upper limits of normal (29 mm). Nondilated thoracic aorta.  No mediastinal/hilar lymphadenopathy. Patulous esophagus.     Left hemidiaphragm elevation. Trace bilateral pleural effusions.  Multisegmental left greater than right bilateral lower lobe relaxation  atelectasis. Trachea and remaining main bronchi are patent.      Multiple right greater than left bilateral solid pulmonary nodules.     Reference nodules:  *  8 mm right upper lobe (series 3/image 47)  *  6 mm right upper lobe (3/24)  *  Sub-6 mm lingular (3/40)  *  Sub-6 mm right lower lobe (3/47)  *  Sub-6 mm right lower lobe (3/48)  *  Sub-6 mm left upper lobe (3/18             Impression:      Multiple right greater than left bilateral solid  subcentimeter pulmonary nodules measuring up to 8 mm will need continued  follow-up as pulmonary metastatic disease cannot be excluded. Outside  prior imaging of the chest, if available, would be helpful to  demonstrate nodule stability.     This report was finalized on 11/27/2024 1:22 PM by Dr. Grady Sandoval M.D on Workstation: [id number]       FL ercp biliary duct only [[id number]] Collected: 11/24/24 1522     Updated: 11/25/24 2200    Narrative:      ERCP INTERPRETATION ONLY 11/24/2024     HISTORY: ERCP.     There is contrast in the intrahepatic, common hepatic, and common bile  ducts. There is moderate biliary dilatation. There is a segment of the  common duct which is incompletely distended and narrowed for a length of  10 mm to 15 mm. A nonexpandable biliary stent is seen coursing into the  common duct.        FLUOROSCOPY TIME:  2 minutes, 26 seconds  ,  4   images, 2631 dose area  product.     This report was finalized on 11/25/2024 9:57 PM by Dr. Samy Zavala M.D on Workstation: [id number]       CT Abdomen With & Without Contrast [[id number]] Collected: 11/22/24 2001     Updated: 11/22/24 2020    Narrative:      CT ABDOMEN W WO  CONTRAST-     HISTORY: Biliary obstruction from pancreatic mass.     TECHNIQUE: Radiation dose reduction techniques were utilized, including  automated exposure control and exposure modulation based on body size.   3 mm images were obtained through the abdomen after the administration  of IV contrast.     COMPARISON: None available        FINDINGS: Chronic appearing L5, L3 and L1 compression fractures with  approximately 50%, 25% and 50% vertebral body height loss respectively.  Left hemidiaphragm elevation. Spleen and colonic splenic flexure are  partially outside the exam field-of-view. A 1.4 cm right renal lesion  shows no enhancement suggesting a hemorrhagic/proteinaceous cyst. Few  subcentimeter hypodense renal lesions too small to characterize. A 2.9 x  2.9 cm hypoenhancing pancreatic head mass (series 6/image 43). Resulting  more distal main pancreatic duct dilation measuring up to 1.4 cm (series  6/image 38) and distal pancreas parenchymal atrophy. Mass causes  moderate intra and extrahepatic biliary duct dilation (series 6/images  23 and 38). Post cholecystectomy. Mass encases and severely narrows the  main portal vein at the portal venous confluence (series 8/image 50). No  vascular interface with the celiac artery or SMA. Noncalcified  atherosclerotic plaque in the proximal SMA. More distal SMA is patent.  No abdominal lymphadenopathy. No focal hepatic lesion. No abdominal  omental or peritoneal nodularity. Remaining solid organs and visualized  bowel are normal.          Impression:      1. A 2.9 cm hypoenhancing pancreatic head mass is concerning for  pancreatic adenocarcinoma. Recommend EUS/FNA. Mass encases and severely  narrows the main portal vein at the portal venous confluence. Mass  causes moderate intra and extrahepatic bili duct dilation.  2. No abdominal lymphadenopathy. No focal hepatic lesion. No abdominal  omental or peritoneal nodularity.     This report was finalized on 11/22/2024 8:17 PM  by Dr. Grady Sandoval M.D on Workstation: [id number]       FL ercp biliary duct only [[id number]] Collected: 11/21/24 1854     Updated: 11/21/24 1905    Narrative:      ERCP INTERPRETATION ONLY 11/21/2024     HISTORY: Attempted ERCP.     FINDINGS: Single limited field-of-view image of the abdomen is  submitted. Endoscope is seen with a small focus of irregular increased  density probably some injected contrast. No contrast is seen within the  biliary system on this image.     Fluoroscopy time 4 minutes 9 seconds 1 image 4104 dose area product                 This report was finalized on 11/21/2024 7:02 PM by Dr. Samy Zavala M.D on Workstation: [id number]               Results for orders placed during the hospital encounter of 01/17/22    Adult Transthoracic Echo Complete W/ Cont if Necessary Per Protocol    Interpretation Summary  · Estimated right ventricular systolic pressure from tricuspid regurgitation is normal (<35 mmHg). Calculated right ventricular systolic pressure from tricuspid regurgitation is 20 mmHg.  · Left ventricular wall thickness is consistent with mild concentric hypertrophy.  · Left ventricular ejection fraction appears to be 51 - 55%. Left ventricular systolic function is low normal.  · Left ventricular diastolic function was indeterminate.  · Technically difficult study due to patient positioning, limited acoustic windows    Pertinent Labs     Results from last 7 days   Lab Units 11/30/24  0453 11/29/24  0806 11/28/24  0614 11/27/24  0624   WBC 10*3/mm3 4.33 4.57 4.12 5.28   HEMOGLOBIN g/dL 10.8* 10.8* 10.7* 10.8*   PLATELETS 10*3/mm3 149 138* 155 140     Results from last 7 days   Lab Units 11/30/24  1622 11/30/24  0453 11/29/24  0806 11/28/24  0614 11/27/24  0624   SODIUM mmol/L  --  141 137 135* 132*   POTASSIUM mmol/L 4.9 3.4* 3.8 3.9 4.2   CHLORIDE mmol/L  --  105 105 104 103   CO2 mmol/L  --  26.4 26.0 26.0 22.0   BUN mg/dL  --  16 15 13 10   CREATININE mg/dL  --  0.67  "0.69 0.70 0.71   GLUCOSE mg/dL  --  240* 260* 263* 338*   EGFR mL/min/1.73  --  91.8 91.2 90.9 89.4     Results from last 7 days   Lab Units 11/30/24 0453 11/29/24  0806 11/28/24  0614 11/27/24  0624   ALBUMIN g/dL 2.6* 2.7* 2.6* 2.5*   BILIRUBIN mg/dL 0.8 0.9 0.9 1.0   ALK PHOS U/L 241* 272* 301* 354*   AST (SGOT) U/L 15 15 17 14   ALT (SGPT) U/L 22 26 28 33     Results from last 7 days   Lab Units 11/30/24 0453 11/29/24  0806 11/28/24  0614 11/27/24  0624   CALCIUM mg/dL 8.6 8.8 8.7 8.3*   ALBUMIN g/dL 2.6* 2.7* 2.6* 2.5*   MAGNESIUM mg/dL 1.9 2.1 2.1 2.4   PHOSPHORUS mg/dL 3.5 3.0 2.7 2.6       Results from last 7 days   Lab Units 11/24/24  1543   DIGOXIN LVL ng/mL 0.50*           Invalid input(s): \"LDLCALC\"          Test Results Pending at Discharge     Pending Results       Procedure [Order ID] Specimen - Date/Time    Basic Metabolic Panel [[id number]]     Specimen: Blood     Phosphorus [[id number]]     Specimen: Blood               Discharge Details        Discharge Medications        New Medications        Instructions Start Date   cetirizine 10 MG tablet  Commonly known as: zyrTEC   5 mg, Oral, Daily      ondansetron ODT 4 MG disintegrating tablet  Commonly known as: ZOFRAN-ODT   4 mg, Oral, Every 8 Hours PRN      sennosides-docusate 8.6-50 MG per tablet  Commonly known as: PERICOLACE   2 tablets, Oral, 2 Times Daily             Changes to Medications        Instructions Start Date   atorvastatin 10 MG tablet  Commonly known as: LIPITOR  What changed: Another medication with the same name was removed. Continue taking this medication, and follow the directions you see here.   Take  by mouth.      tiZANidine 2 MG tablet  Commonly known as: ZANAFLEX  What changed:   when to take this  reasons to take this   2 mg, Oral, Every 8 Hours PRN             Continue These Medications        Instructions Start Date   apixaban 5 MG tablet tablet  Commonly known as: ELIQUIS   5 mg, Oral, Every 12 Hours Scheduled    "   ascorbic acid 500 MG tablet  Commonly known as: VITAMIN C   500 mg, Daily      aspirin 81 MG EC tablet   81 mg, Daily      baclofen 10 MG tablet  Commonly known as: LIORESAL   10 mg, 2 Times Daily      buPROPion  MG 24 hr tablet  Commonly known as: WELLBUTRIN XL   150 mg, Daily      busPIRone 15 MG tablet  Commonly known as: BUSPAR   15 mg, 3 Times Daily      cilostazol 100 MG tablet  Commonly known as: PLETAL   100 mg, 2 Times Daily      colestipol 1 g tablet  Commonly known as: COLESTID   1 g, 2 Times Daily      digoxin 125 MCG tablet  Commonly known as: LANOXIN   125 mcg, Daily Digoxin      Ferrous Fumarate 324 (106 Fe) MG tablet   324 mg, Daily      furosemide 20 MG tablet  Commonly known as: LASIX   20 mg      insulin glargine 100 UNIT/ML injection  Commonly known as: LANTUS, SEMGLEE   60 Units, Subcutaneous, Nightly      insulin lispro 100 UNIT/ML injection  Commonly known as: humaLOG   3 Times Daily Before Meals      Jardiance 10 MG tablet tablet  Generic drug: empagliflozin   10 mg, Daily      l-methylfolate calcium 7.5 MG tablet tablet  Commonly known as: DEPLIN   7.5 mg, Daily      levETIRAcetam 500 MG tablet  Commonly known as: KEPPRA   500 mg, 2 Times Daily      metoprolol tartrate 100 MG tablet  Commonly known as: LOPRESSOR   100 mg, 2 Times Daily      milnacipran 50 MG tablet tablet  Commonly known as: SAVELLA   50 mg, Every 12 Hours Scheduled      multivitamin with minerals tablet tablet   1 tablet, Daily      potassium chloride 10 MEQ CR tablet  Commonly known as: KLOR-CON M10   20 mEq, 2 Times Daily      prazosin 2 MG capsule  Commonly known as: MINIPRESS   2 mg, Nightly      QUEtiapine 100 MG tablet  Commonly known as: SEROquel   100 mg, Oral, Nightly      traMADol 50 MG tablet  Commonly known as: ULTRAM   50 mg, Every 8 Hours PRN      traZODone 50 MG tablet  Commonly known as: DESYREL   50 mg, Oral               No Known Allergies    Discharge Disposition:  Skilled Nursing Facility ([place] -  External)      Discharge Diet:  Diet Order   Procedures    Diet: Regular/House, Diabetic, Gastrointestinal; Consistent Carbohydrate; Fat-Restricted; Texture: Regular (IDDSI 7); Fluid Consistency: Thin (IDDSI 0)       Discharge Activity:   Activity Instructions       Gradually Increase Activity Until at Pre-Hospitalization Level      Gradually Increase Activity Until at Pre-Hospitalization Level              CODE STATUS:    Code Status and Medical Interventions: No CPR (Do Not Attempt to Resuscitate); Comfort Measures   Ordered at: 12/01/24 1035     Level Of Support Discussed With:    Patient     Code Status (Patient has no pulse and is not breathing):    No CPR (Do Not Attempt to Resuscitate)     Medical Interventions (Patient has pulse or is breathing):    Comfort Measures       Future Appointments   Date Time Provider Department Center   12/12/2024  2:40 PM LAB CHAIR 5 [facility]  [facility]   12/12/2024  3:00 PM Clara Quiñonez APRN [facility]   12/19/2024  7:30 AM [facility] PORT CHAIR  [facility]   12/19/2024  8:00 AM Fay Ramon MD [facility]   12/19/2024  8:45 AM CHAIR 20 [facility]  [facility]      Contact information for follow-up providers       Sasha Bui MD .    Specialty: Family Medicine  Contact information:  [address]  [facility] [address] [phone]               [facility] Follow up.    Specialty: Hospice  Why: [facility] will follow up with you at your long term care facility  Contact information:  [address]  [facility] [address] [phone]                     Contact information for after-discharge care       Destination       [facility] [place] .    Service: Nursing Home  Contact information:  [address] [facility] [address] [phone]                                   Time Spent on Discharge:  Greater than 30 minutes      MASOUD Duckworth  [facility]  12/01/24  16:16 EST    Electronically signed by Adrian Louis MD at 12/01/24 0397

## (undated) DEVICE — SPNG LAP PREWSH SFTPK 18X18IN STRL PK/5

## (undated) DEVICE — TUBING, SUCTION, 1/4" X 10', STRAIGHT: Brand: MEDLINE

## (undated) DEVICE — DRSNG SLVR/ANTIBAC PRIMASEAL POST/OP ADHS 3.5X12IN

## (undated) DEVICE — ADAPT CLN BIOGUARD AIR/H2O DISP

## (undated) DEVICE — SPHINCTEROTOME: Brand: HYDRATOME RX 44

## (undated) DEVICE — BLCK/BITE BLOX W/DENTL/RIM W/STRAP 54F

## (undated) DEVICE — SUT VIC 3/0 SH 27IN J416H

## (undated) DEVICE — SUT SILK 2/0 SH 30IN K833H

## (undated) DEVICE — BLANKT WARM PACU MISTRAL/AIR PREM REFL A/ 85.8X50IN

## (undated) DEVICE — SUT SILK 2/0 SH CR8 18IN CR8 C012D

## (undated) DEVICE — BASIC SINGLE BASIN-LF: Brand: MEDLINE INDUSTRIES, INC.

## (undated) DEVICE — SENSR O2 OXIMAX FNGR A/ 18IN NONSTR

## (undated) DEVICE — TOWEL,OR,DSP,ST,BLUE,STD,4/PK,20PK/CS: Brand: MEDLINE

## (undated) DEVICE — SUT SILK 3/0 SH CR8 18IN C013D

## (undated) DEVICE — DRSNG SURG AQUACEL AG 9X25CM

## (undated) DEVICE — GLV SURG SENSICARE PI ORTHO SZ8 LF STRL

## (undated) DEVICE — CANNULATING SPHINCTEROTOME: Brand: JAGTOME™ REVOLUTION RX

## (undated) DEVICE — DRSNG SURG AQUACEL AG 9X15CM

## (undated) DEVICE — Device

## (undated) DEVICE — ANTIBACTERIAL UNDYED BRAIDED (POLYGLACTIN 910), SYNTHETIC ABSORBABLE SUTURE: Brand: COATED VICRYL

## (undated) DEVICE — DEV LK WIREGUIDE FUSN OLYMP SCP

## (undated) DEVICE — KT ORCA ORCAPOD DISP STRL

## (undated) DEVICE — SNAR POLYP CAPTIVATOR RND STFF 2.4 240CM 10MM 1P/U

## (undated) DEVICE — ERBE NESSY®PLATE 170 SPLIT; 168CM²; CABLE 3M: Brand: ERBE

## (undated) DEVICE — EXTREMITY-LF: Brand: MEDLINE INDUSTRIES, INC.

## (undated) DEVICE — INTENDED FOR TISSUE SEPARATION, AND OTHER PROCEDURES THAT REQUIRE A SHARP SURGICAL BLADE TO PUNCTURE OR CUT.: Brand: BARD-PARKER ® CARBON RIB-BACK BLADES

## (undated) DEVICE — STRYKER PERFORMANCE SERIES SAGITTAL BLADE: Brand: STRYKER PERFORMANCE SERIES

## (undated) DEVICE — STERILE POLYISOPRENE POWDER-FREE SURGICAL GLOVES: Brand: PROTEXIS

## (undated) DEVICE — FRCP GRASP RESCUE RETRV RAT/TOOTH 7X230MM 1P/U

## (undated) DEVICE — WIREGUIDED CYTOLOGY BRUSH: Brand: RX CYTOLOGY BRUSH

## (undated) DEVICE — SUT SILK 2/0 TIES 18IN A185H

## (undated) DEVICE — SOL IRR NACL 0.9PCT BT 1000ML

## (undated) DEVICE — CANN O2 ETCO2 FITS ALL CONN CO2 SMPL A/ 7IN DISP LF

## (undated) DEVICE — GOWN,AURORA,FABRIC-REINFORCED,X-LARGE: Brand: MEDLINE

## (undated) DEVICE — LN SMPL CO2 SHTRM SD STREAM W/M LUER

## (undated) DEVICE — PENCL E/S SMOKEEVAC W/TELESCP CANN

## (undated) DEVICE — PROXIMATE RH ROTATING HEAD SKIN STAPLERS (35 WIDE) CONTAINS 35 STAINLESS STEEL STAPLES: Brand: PROXIMATE

## (undated) DEVICE — APPL CHLORAPREP HI/LITE 26ML ORNG

## (undated) DEVICE — SINGLE USE DISTAL COVER MAJ-2315: Brand: SINGLE USE DISTAL COVER